# Patient Record
Sex: FEMALE | Race: WHITE | Employment: OTHER | URBAN - METROPOLITAN AREA
[De-identification: names, ages, dates, MRNs, and addresses within clinical notes are randomized per-mention and may not be internally consistent; named-entity substitution may affect disease eponyms.]

---

## 2017-04-18 ENCOUNTER — OFFICE VISIT (OUTPATIENT)
Dept: CARDIOLOGY CLINIC | Age: 67
End: 2017-04-18

## 2017-04-18 VITALS
WEIGHT: 143 LBS | DIASTOLIC BLOOD PRESSURE: 80 MMHG | SYSTOLIC BLOOD PRESSURE: 130 MMHG | HEIGHT: 62 IN | HEART RATE: 82 BPM | BODY MASS INDEX: 26.31 KG/M2

## 2017-04-18 DIAGNOSIS — R07.9 CHEST PAIN, UNSPECIFIED TYPE: Primary | ICD-10-CM

## 2017-04-18 DIAGNOSIS — I25.10 CORONARY ARTERY DISEASE INVOLVING NATIVE HEART WITHOUT ANGINA PECTORIS, UNSPECIFIED VESSEL OR LESION TYPE: ICD-10-CM

## 2017-04-18 DIAGNOSIS — Z72.0 TOBACCO ABUSE: ICD-10-CM

## 2017-04-18 PROCEDURE — 93000 ELECTROCARDIOGRAM COMPLETE: CPT | Performed by: NURSE PRACTITIONER

## 2017-04-18 PROCEDURE — 99214 OFFICE O/P EST MOD 30 MIN: CPT | Performed by: NURSE PRACTITIONER

## 2017-06-23 RX ORDER — CARVEDILOL 25 MG/1
TABLET ORAL
Qty: 180 TABLET | Refills: 3 | Status: SHIPPED | OUTPATIENT
Start: 2017-06-23 | End: 2018-06-29 | Stop reason: SDUPTHER

## 2017-06-23 RX ORDER — CLOPIDOGREL BISULFATE 75 MG/1
TABLET ORAL
Qty: 90 TABLET | Refills: 3 | Status: SHIPPED | OUTPATIENT
Start: 2017-06-23 | End: 2018-06-29 | Stop reason: SDUPTHER

## 2017-06-23 RX ORDER — LISINOPRIL 40 MG/1
40 TABLET ORAL DAILY
Qty: 90 TABLET | Refills: 3 | Status: SHIPPED | OUTPATIENT
Start: 2017-06-23 | End: 2018-06-25 | Stop reason: SDUPTHER

## 2017-06-23 RX ORDER — PRAVASTATIN SODIUM 40 MG
TABLET ORAL
Qty: 90 TABLET | Refills: 3 | Status: SHIPPED | OUTPATIENT
Start: 2017-06-23 | End: 2018-06-29 | Stop reason: SDUPTHER

## 2018-03-30 ENCOUNTER — TELEPHONE (OUTPATIENT)
Dept: CARDIOLOGY CLINIC | Age: 68
End: 2018-03-30

## 2018-03-30 DIAGNOSIS — R06.02 SOB (SHORTNESS OF BREATH): Primary | ICD-10-CM

## 2018-04-06 ENCOUNTER — HOSPITAL ENCOUNTER (OUTPATIENT)
Dept: NON INVASIVE DIAGNOSTICS | Age: 68
Discharge: HOME | End: 2018-04-06
Attending: NURSE PRACTITIONER | Admitting: NURSE PRACTITIONER

## 2018-04-06 DIAGNOSIS — R06.02 SHORTNESS OF BREATH: ICD-10-CM

## 2018-04-06 LAB
LEFT VENTRICULAR EJECTION FRACTION HIGH VALUE: 40 %
LEFT VENTRICULAR EJECTION FRACTION MODE: NORMAL
LV EF: 40 %
LVEF MODALITY: NORMAL

## 2018-06-21 ENCOUNTER — TELEPHONE (OUTPATIENT)
Dept: CARDIOLOGY CLINIC | Age: 68
End: 2018-06-21

## 2018-06-25 RX ORDER — LISINOPRIL 40 MG/1
40 TABLET ORAL DAILY
Qty: 90 TABLET | Refills: 0 | Status: SHIPPED | OUTPATIENT
Start: 2018-06-25 | End: 2018-09-24 | Stop reason: SDUPTHER

## 2018-06-29 ENCOUNTER — OFFICE VISIT (OUTPATIENT)
Dept: CARDIOLOGY CLINIC | Age: 68
End: 2018-06-29

## 2018-06-29 VITALS
SYSTOLIC BLOOD PRESSURE: 160 MMHG | WEIGHT: 144.6 LBS | BODY MASS INDEX: 26.61 KG/M2 | DIASTOLIC BLOOD PRESSURE: 80 MMHG | HEIGHT: 62 IN

## 2018-06-29 DIAGNOSIS — I25.10 CORONARY ARTERY DISEASE INVOLVING NATIVE HEART WITHOUT ANGINA PECTORIS, UNSPECIFIED VESSEL OR LESION TYPE: ICD-10-CM

## 2018-06-29 DIAGNOSIS — Z72.0 TOBACCO ABUSE: ICD-10-CM

## 2018-06-29 DIAGNOSIS — I10 ESSENTIAL HYPERTENSION: ICD-10-CM

## 2018-06-29 DIAGNOSIS — R07.9 CHEST PAIN, UNSPECIFIED TYPE: Primary | ICD-10-CM

## 2018-06-29 PROCEDURE — 4004F PT TOBACCO SCREEN RCVD TLK: CPT | Performed by: NURSE PRACTITIONER

## 2018-06-29 PROCEDURE — 4040F PNEUMOC VAC/ADMIN/RCVD: CPT | Performed by: NURSE PRACTITIONER

## 2018-06-29 PROCEDURE — G8400 PT W/DXA NO RESULTS DOC: HCPCS | Performed by: NURSE PRACTITIONER

## 2018-06-29 PROCEDURE — 99214 OFFICE O/P EST MOD 30 MIN: CPT | Performed by: NURSE PRACTITIONER

## 2018-06-29 PROCEDURE — 93000 ELECTROCARDIOGRAM COMPLETE: CPT | Performed by: NURSE PRACTITIONER

## 2018-06-29 PROCEDURE — 1123F ACP DISCUSS/DSCN MKR DOCD: CPT | Performed by: NURSE PRACTITIONER

## 2018-06-29 PROCEDURE — G8598 ASA/ANTIPLAT THER USED: HCPCS | Performed by: NURSE PRACTITIONER

## 2018-06-29 PROCEDURE — 3017F COLORECTAL CA SCREEN DOC REV: CPT | Performed by: NURSE PRACTITIONER

## 2018-06-29 PROCEDURE — G8427 DOCREV CUR MEDS BY ELIG CLIN: HCPCS | Performed by: NURSE PRACTITIONER

## 2018-06-29 PROCEDURE — 1090F PRES/ABSN URINE INCON ASSESS: CPT | Performed by: NURSE PRACTITIONER

## 2018-06-29 PROCEDURE — G8419 CALC BMI OUT NRM PARAM NOF/U: HCPCS | Performed by: NURSE PRACTITIONER

## 2018-06-29 RX ORDER — PRAVASTATIN SODIUM 40 MG
TABLET ORAL
Qty: 90 TABLET | Refills: 3 | Status: SHIPPED | OUTPATIENT
Start: 2018-06-29 | End: 2019-06-27 | Stop reason: SDUPTHER

## 2018-06-29 RX ORDER — CARVEDILOL 25 MG/1
TABLET ORAL
Qty: 180 TABLET | Refills: 3 | Status: SHIPPED | OUTPATIENT
Start: 2018-06-29 | End: 2019-06-27 | Stop reason: SDUPTHER

## 2018-06-29 RX ORDER — CLOPIDOGREL BISULFATE 75 MG/1
TABLET ORAL
Qty: 90 TABLET | Refills: 3 | Status: SHIPPED | OUTPATIENT
Start: 2018-06-29 | End: 2019-06-27 | Stop reason: SDUPTHER

## 2018-06-29 RX ORDER — GABAPENTIN 100 MG/1
100 CAPSULE ORAL 3 TIMES DAILY
COMMUNITY
End: 2020-05-22

## 2018-09-24 RX ORDER — LISINOPRIL 40 MG/1
TABLET ORAL
Qty: 90 TABLET | Refills: 0 | Status: SHIPPED | OUTPATIENT
Start: 2018-09-24 | End: 2018-12-21 | Stop reason: SDUPTHER

## 2018-12-04 ENCOUNTER — OFFICE VISIT (OUTPATIENT)
Dept: CARDIOLOGY CLINIC | Age: 68
End: 2018-12-04
Payer: MEDICARE

## 2018-12-04 VITALS
DIASTOLIC BLOOD PRESSURE: 60 MMHG | HEIGHT: 62 IN | SYSTOLIC BLOOD PRESSURE: 122 MMHG | WEIGHT: 144 LBS | HEART RATE: 62 BPM | BODY MASS INDEX: 26.5 KG/M2

## 2018-12-04 DIAGNOSIS — E78.5 HYPERLIPIDEMIA, UNSPECIFIED HYPERLIPIDEMIA TYPE: ICD-10-CM

## 2018-12-04 DIAGNOSIS — I42.9 CARDIOMYOPATHY, UNSPECIFIED TYPE (HCC): ICD-10-CM

## 2018-12-04 DIAGNOSIS — I25.10 CORONARY ARTERY DISEASE INVOLVING NATIVE HEART WITHOUT ANGINA PECTORIS, UNSPECIFIED VESSEL OR LESION TYPE: ICD-10-CM

## 2018-12-04 DIAGNOSIS — Z72.0 TOBACCO ABUSE: Primary | ICD-10-CM

## 2018-12-04 PROCEDURE — G8484 FLU IMMUNIZE NO ADMIN: HCPCS | Performed by: NURSE PRACTITIONER

## 2018-12-04 PROCEDURE — 3017F COLORECTAL CA SCREEN DOC REV: CPT | Performed by: NURSE PRACTITIONER

## 2018-12-04 PROCEDURE — G8419 CALC BMI OUT NRM PARAM NOF/U: HCPCS | Performed by: NURSE PRACTITIONER

## 2018-12-04 PROCEDURE — 1123F ACP DISCUSS/DSCN MKR DOCD: CPT | Performed by: NURSE PRACTITIONER

## 2018-12-04 PROCEDURE — 1101F PT FALLS ASSESS-DOCD LE1/YR: CPT | Performed by: NURSE PRACTITIONER

## 2018-12-04 PROCEDURE — 99214 OFFICE O/P EST MOD 30 MIN: CPT | Performed by: NURSE PRACTITIONER

## 2018-12-04 PROCEDURE — G8598 ASA/ANTIPLAT THER USED: HCPCS | Performed by: NURSE PRACTITIONER

## 2018-12-04 PROCEDURE — 4040F PNEUMOC VAC/ADMIN/RCVD: CPT | Performed by: NURSE PRACTITIONER

## 2018-12-04 PROCEDURE — G8400 PT W/DXA NO RESULTS DOC: HCPCS | Performed by: NURSE PRACTITIONER

## 2018-12-04 PROCEDURE — G8427 DOCREV CUR MEDS BY ELIG CLIN: HCPCS | Performed by: NURSE PRACTITIONER

## 2018-12-04 PROCEDURE — 4004F PT TOBACCO SCREEN RCVD TLK: CPT | Performed by: NURSE PRACTITIONER

## 2018-12-04 PROCEDURE — 1090F PRES/ABSN URINE INCON ASSESS: CPT | Performed by: NURSE PRACTITIONER

## 2018-12-21 RX ORDER — LISINOPRIL 40 MG/1
TABLET ORAL
Qty: 90 TABLET | Refills: 3 | Status: SHIPPED | OUTPATIENT
Start: 2018-12-21 | End: 2019-12-17 | Stop reason: SDUPTHER

## 2019-01-09 ENCOUNTER — TELEPHONE (OUTPATIENT)
Dept: CARDIOLOGY CLINIC | Age: 69
End: 2019-01-09

## 2019-01-09 ENCOUNTER — OFFICE VISIT (OUTPATIENT)
Dept: CARDIOLOGY CLINIC | Age: 69
End: 2019-01-09
Payer: MEDICARE

## 2019-01-09 VITALS
BODY MASS INDEX: 26.31 KG/M2 | WEIGHT: 143 LBS | HEART RATE: 72 BPM | HEIGHT: 62 IN | DIASTOLIC BLOOD PRESSURE: 60 MMHG | SYSTOLIC BLOOD PRESSURE: 120 MMHG

## 2019-01-09 DIAGNOSIS — R07.9 CHEST PAIN, UNSPECIFIED TYPE: Primary | ICD-10-CM

## 2019-01-09 DIAGNOSIS — I25.10 CORONARY ARTERY DISEASE INVOLVING NATIVE HEART WITHOUT ANGINA PECTORIS, UNSPECIFIED VESSEL OR LESION TYPE: ICD-10-CM

## 2019-01-09 DIAGNOSIS — Z72.0 TOBACCO ABUSE: ICD-10-CM

## 2019-01-09 DIAGNOSIS — I10 ESSENTIAL HYPERTENSION: ICD-10-CM

## 2019-01-09 PROCEDURE — 4040F PNEUMOC VAC/ADMIN/RCVD: CPT | Performed by: NURSE PRACTITIONER

## 2019-01-09 PROCEDURE — 99214 OFFICE O/P EST MOD 30 MIN: CPT | Performed by: NURSE PRACTITIONER

## 2019-01-09 PROCEDURE — 1123F ACP DISCUSS/DSCN MKR DOCD: CPT | Performed by: NURSE PRACTITIONER

## 2019-01-09 PROCEDURE — 4004F PT TOBACCO SCREEN RCVD TLK: CPT | Performed by: NURSE PRACTITIONER

## 2019-01-09 PROCEDURE — G8419 CALC BMI OUT NRM PARAM NOF/U: HCPCS | Performed by: NURSE PRACTITIONER

## 2019-01-09 PROCEDURE — G8427 DOCREV CUR MEDS BY ELIG CLIN: HCPCS | Performed by: NURSE PRACTITIONER

## 2019-01-09 PROCEDURE — 1090F PRES/ABSN URINE INCON ASSESS: CPT | Performed by: NURSE PRACTITIONER

## 2019-01-09 PROCEDURE — G8400 PT W/DXA NO RESULTS DOC: HCPCS | Performed by: NURSE PRACTITIONER

## 2019-01-09 PROCEDURE — 93000 ELECTROCARDIOGRAM COMPLETE: CPT | Performed by: NURSE PRACTITIONER

## 2019-01-09 PROCEDURE — 1101F PT FALLS ASSESS-DOCD LE1/YR: CPT | Performed by: NURSE PRACTITIONER

## 2019-01-09 PROCEDURE — G8598 ASA/ANTIPLAT THER USED: HCPCS | Performed by: NURSE PRACTITIONER

## 2019-01-09 PROCEDURE — G8484 FLU IMMUNIZE NO ADMIN: HCPCS | Performed by: NURSE PRACTITIONER

## 2019-01-09 PROCEDURE — 3017F COLORECTAL CA SCREEN DOC REV: CPT | Performed by: NURSE PRACTITIONER

## 2019-01-09 RX ORDER — AMLODIPINE BESYLATE 5 MG/1
5 TABLET ORAL DAILY
COMMUNITY

## 2019-01-11 ENCOUNTER — HOSPITAL ENCOUNTER (OUTPATIENT)
Dept: NON INVASIVE DIAGNOSTICS | Age: 69
Discharge: HOME | End: 2019-01-11
Payer: MEDICARE

## 2019-01-11 LAB
LV EF: 40 %
LVEF MODALITY: NORMAL

## 2019-01-11 PROCEDURE — A9502 TC99M TETROFOSMIN: HCPCS | Performed by: FAMILY MEDICINE

## 2019-01-11 PROCEDURE — 3430000000 HC RX DIAGNOSTIC RADIOPHARMACEUTICAL: Performed by: FAMILY MEDICINE

## 2019-01-11 PROCEDURE — 78452 HT MUSCLE IMAGE SPECT MULT: CPT | Performed by: INTERNAL MEDICINE

## 2019-01-11 PROCEDURE — 6360000002 HC RX W HCPCS: Performed by: FAMILY MEDICINE

## 2019-01-11 PROCEDURE — 93017 CV STRESS TEST TRACING ONLY: CPT | Performed by: INTERNAL MEDICINE

## 2019-01-11 RX ADMIN — TETROFOSMIN 30 MILLICURIE: 0.23 INJECTION, POWDER, LYOPHILIZED, FOR SOLUTION INTRAVENOUS at 08:57

## 2019-01-11 RX ADMIN — REGADENOSON 0.4 MG: 0.08 INJECTION, SOLUTION INTRAVENOUS at 08:58

## 2019-01-11 RX ADMIN — TETROFOSMIN 10 MILLICURIE: 0.23 INJECTION, POWDER, LYOPHILIZED, FOR SOLUTION INTRAVENOUS at 07:53

## 2019-06-27 ENCOUNTER — TELEPHONE (OUTPATIENT)
Dept: CARDIOLOGY CLINIC | Age: 69
End: 2019-06-27

## 2019-06-27 RX ORDER — PRAVASTATIN SODIUM 40 MG
TABLET ORAL
Qty: 90 TABLET | Refills: 3 | Status: SHIPPED | OUTPATIENT
Start: 2019-06-27 | End: 2020-06-24

## 2019-06-27 RX ORDER — CLOPIDOGREL BISULFATE 75 MG/1
TABLET ORAL
Qty: 90 TABLET | Refills: 3 | Status: SHIPPED | OUTPATIENT
Start: 2019-06-27 | End: 2020-06-19 | Stop reason: SDUPTHER

## 2019-06-27 RX ORDER — CARVEDILOL 25 MG/1
TABLET ORAL
Qty: 180 TABLET | Refills: 3 | Status: SHIPPED | OUTPATIENT
Start: 2019-06-27 | End: 2020-06-10 | Stop reason: SDUPTHER

## 2019-06-27 NOTE — TELEPHONE ENCOUNTER
Medication Refill    Last appointment with cardiology? 01-09-19    Next appointment with Cardiology? Will call next month to schedule    Medication needing refilled: ogrel (PLAVIX) 75 MG tablet    carvedilol (COREG) 25 MG tablet     pravastatin (PRAVACHOL) 40 MG tablet    Doseage of the medication:     How are you taking this medication (QD, BID, TID, QID, PRN):    Patient want a 30 or 90 day supply called in: 80    Which Pharmacy are we sending the medication to [facility] on [facility] - #[phone]    THIS IS A NEW PHARMACY.

## 2019-12-17 ENCOUNTER — TELEPHONE (OUTPATIENT)
Dept: CARDIOLOGY CLINIC | Age: 69
End: 2019-12-17

## 2019-12-17 RX ORDER — LISINOPRIL 40 MG/1
TABLET ORAL
Qty: 90 TABLET | Refills: 1 | Status: SHIPPED | OUTPATIENT
Start: 2019-12-17 | End: 2020-06-10

## 2020-05-05 ENCOUNTER — TELEPHONE (OUTPATIENT)
Dept: CARDIOLOGY CLINIC | Age: 70
End: 2020-05-05

## 2020-05-21 ENCOUNTER — TELEPHONE (OUTPATIENT)
Dept: CARDIOLOGY CLINIC | Age: 70
End: 2020-05-21

## 2020-05-21 NOTE — TELEPHONE ENCOUNTER
Called pt, states she is having chest tightness that started yesterday. I advised her to go to the ER. She stated she does not want to do that. I offered an appt with Dr Ivan Cloud in [facility]. She states \"I'll get lost\". I offered to give her the address, but she declined to come to . I advised her, again, to go to the ER. She states \"I'll try and get there today\". I then offered an appt with TS in . She agreed to see NPTS 5/22/20. appt made.

## 2020-05-21 NOTE — TELEPHONE ENCOUNTER
Chest pain starting on Tuesday night , pain is constant  . Patient states she feels \"bad\" but has no nausea, dizziness , numbness, weakness or SOB . Asking for appt today or  tomorrow with ELLEN.

## 2020-05-22 ENCOUNTER — OFFICE VISIT (OUTPATIENT)
Dept: CARDIOLOGY CLINIC | Age: 70
End: 2020-05-22
Payer: MEDICARE

## 2020-05-22 VITALS
OXYGEN SATURATION: 95 % | HEART RATE: 74 BPM | SYSTOLIC BLOOD PRESSURE: 112 MMHG | WEIGHT: 138 LBS | BODY MASS INDEX: 25.4 KG/M2 | DIASTOLIC BLOOD PRESSURE: 60 MMHG | HEIGHT: 62 IN

## 2020-05-22 PROCEDURE — 93000 ELECTROCARDIOGRAM COMPLETE: CPT | Performed by: NURSE PRACTITIONER

## 2020-05-22 PROCEDURE — 99214 OFFICE O/P EST MOD 30 MIN: CPT | Performed by: NURSE PRACTITIONER

## 2020-05-22 RX ORDER — FAMOTIDINE 20 MG/1
20 TABLET, FILM COATED ORAL 2 TIMES DAILY
Qty: 60 TABLET | Refills: 0 | Status: SHIPPED | OUTPATIENT
Start: 2020-05-22 | End: 2020-06-22 | Stop reason: SDUPTHER

## 2020-05-22 NOTE — PROGRESS NOTES
[address]   Cardiac Evaluation      Patient: Stefania Reese  YOB: 1950         Chief Complaint   Patient presents with    Coronary Artery Disease     C/o cp     Cardiomyopathy    Hypertension    Hyperlipidemia        Referring provider: Adrienne Walker MD    History of Present Illness:  Ms. Cindy España is here for an acute visit. Her history includes coronary disease with stent placed to proximal LAD 9/10, cardiomyopathy, hypertension, and hyperlipidemia. MUGA performed 12/10 revealed at EF of 37%. Echo 10/13 revealed EF 25-30%. She did have GXT 12/14 and EF was 44 % , ECHO 4/18 showed EF 40%, she has not had any exacerbations of CHF. Ms Cindy España continues to smoke 1 PPD. Today, Ms. Ahmadi complains of chest pain which primarily occurs after eating. Her pain occasionally wakes her up from sleeping when she is lying down. It feels like a tightness in her chest. She has not been taking her Nexium in over one year, but she can't remember exactly when it was stopped. She is not very active, but she doesn't notice any pain with exertion. Ms. Cindy España can't recall how long the pain lasts but she occasionally takes Tums for the pain and it helps. She does not remember what the pain was like that brought her in for her heart attack and stent in 2010. Ms. Cindy España is dealing with a lot of personal stress in her life. Her granddaughter, her boyfriend, and their baby were living with her until last night. She states it is for the best that they suddenly moved out today. Past Medical History:   has a past medical history of Arthritis, CAD (coronary artery disease), and Hypertension. Surgical History:   has a past surgical history that includes Hysterectomy; Bladder surgery (2010); and Colonoscopy.      Current Outpatient Medications   Medication Sig Dispense Refill    lisinopril (PRINIVIL;ZESTRIL) 40 MG tablet TAKE 1 TABLET BY MOUTH ONCE DAILY 90 tablet 1    clopidogrel (PLAVIX) 75 MG Relation Age of Onset    High Blood Pressure Mother     Stroke Mother     Heart Disease Father      Family history has been reviewed and not pertinent except as noted above. Review of Systems:   · Constitutional: there has been no unanticipated weight loss. No change in energy or activity level   · Eyes: No visual changes   · ENT: No Headaches, hearing loss or vertigo. No mouth sores or sore throat. · Cardiovascular: Reviewed in HPI  · Respiratory: No cough or wheezing, no sputum production. · Gastrointestinal: No abdominal pain, appetite loss, blood in stools. No change in bowel or bladder habits. · Genitourinary: No nocturia, dysuria, trouble voiding  · Musculoskeletal:  No gait disturbance, weakness or joint complaints. · Integumentary: No rash or pruritis. · Neurological: No headache, change in muscle strength, numbness or tingling. No change in gait, balance, coordination, mood, affect, memory, mentation, behavior. · Psychiatric: No anxiety or depression  · Endocrine: No malaise or fever  · Hematologic/Lymphatic: No abnormal bruising or bleeding, blood clots or swollen lymph nodes. · Allergic/Immunologic: No nasal congestion or hives. Physical Examination:    Vitals:    05/22/20 0931   BP: 112/60   Site: Left Upper Arm   Position: Sitting   Cuff Size: Medium Adult   Pulse: 74   SpO2: 95%   Weight: 138 lb (62.6 kg)   Height: 5' 2\" (1.575 m)     Body mass index is 25.24 kg/m². Wt Readings from Last 3 Encounters:   05/22/20 138 lb (62.6 kg)   01/09/19 143 lb (64.9 kg)   12/03/18 144 lb (65.3 kg)      BP Readings from Last 3 Encounters:   05/22/20 112/60   01/09/19 120/60   12/03/18 122/60        Physical Examination:    · CONSTITUTIONAL: Well developed, well nourished  · EYES: PERRLA. No xanthelasma, sclera non icteric  · EARS,NOSE,MOUTH,THROAT:  Mucous membranes moist, normal hearing  · NECK: Supple, JVP normal, thyroid not enlarged.  Carotids 2+ without bruits  · RESPIRATORY: Normal effort, no rales or rhonchi  · CARDIOVASCULAR: Normal PMI, regular rate and rhythm, no murmurs, rub or gallop. No edema. Radial pulses present and equal  · CHEST: No scar or masses  · ABDOMEN: Normal bowel sounds. No masses or tenderness. No bruit  · MUSCULOSKELETAL: No clubbing or cyanosis. Moves all extremities well. Normal gait  · SKIN:  Warm and dry. No rashes  · NEUROLOGIC: Cranial nerves intact. Alert and oriented  · PSYCHIATRIC: Calm affect. Appears to have normal judgement and insight    Assessment/Plan  1. Coronary artery disease   ~Nuclear stress 1/2019: There is a moderate-large anterior, septal and apical fixed defect c/w scar. There is no significant ischemia. There is anterior, septal and apical hypokinesis with EF 40%. Similar to 2014 study. ~s/p PCI of proximal LAD in 2010  ~On plavix/ASA/BB/Statin    2. Chest pain           ~Complaining of pain primarily after she eats or is lying down           ~Off of Nexium for roughly one year?           ~Dealing with a lot of personal stress           ~EKG today RBBB (no change)           ~Will start her on pepcid 20mg BID  3. Hyperlipidemia           ~Lipids (5/7/20): , Trig 163           ~On Pravachol 80 mg.            ~Being managed by her PCP, she recently increased her dose from 40 to 80  4. Hypertension           ~Controlled on Norvasc, lisinopril, and coreg            ~Does not check BP at home   5. Cardiomyopathy            ~Stable, no signs of CHF           ~ECHO 4/18 40%           ~Echo 10/13: EF 25-30%. severe hypokinesis of the apical anteroseptal wall. Mild aortic            regurgitation is present. Mild mitral regurgitation is present. mild tricuspid regurgitation             with RVSP 21 mmHg           ~MUGA 12/10: EF 37%           ~Echo 1/25/10: EF 20-25%, mild AR, decreased diastolic function           ~MUGA 6/14 39%           ~GXT 1/2019 40%            ~On Coreg 25 BID    Plan  1. Start pepcid.   2. EKG today: RBBB, no change from prior EKG  3. Return in 4 weeks to see if symptoms resolved  4. Will consider ordering carotids next appointment    Thank you for allowing to me to participate in the care of Houston Mason.

## 2020-06-10 RX ORDER — LISINOPRIL 40 MG/1
TABLET ORAL
Qty: 90 TABLET | Refills: 1 | Status: SHIPPED | OUTPATIENT
Start: 2020-06-10 | End: 2020-12-08

## 2020-06-10 RX ORDER — CARVEDILOL 25 MG/1
TABLET ORAL
Qty: 180 TABLET | Refills: 3 | Status: SHIPPED | OUTPATIENT
Start: 2020-06-10 | End: 2021-06-07 | Stop reason: SDUPTHER

## 2020-06-18 NOTE — TELEPHONE ENCOUNTER
Prescription refill    Last OV:  05/22/20    Last Refill:  06/27/2019    Labs:05/07/20  [facility]    Future Appt: 06/24/20

## 2020-06-19 RX ORDER — PRAVASTATIN SODIUM 80 MG/1
80 TABLET ORAL DAILY
Qty: 90 TABLET | Refills: 3 | OUTPATIENT
Start: 2020-06-19

## 2020-06-19 RX ORDER — CLOPIDOGREL BISULFATE 75 MG/1
TABLET ORAL
Qty: 90 TABLET | Refills: 3 | Status: SHIPPED | OUTPATIENT
Start: 2020-06-19 | End: 2021-06-07 | Stop reason: SDUPTHER

## 2020-06-19 NOTE — TELEPHONE ENCOUNTER
Patient stated her PCP fills her statin and follows her lipid levels. Please have her her follow up with Dr. Chuckie Garcia for that.

## 2020-06-22 ENCOUNTER — TELEPHONE (OUTPATIENT)
Dept: CARDIOLOGY CLINIC | Age: 70
End: 2020-06-22

## 2020-06-22 RX ORDER — FAMOTIDINE 20 MG/1
20 TABLET, FILM COATED ORAL 2 TIMES DAILY
Qty: 60 TABLET | Refills: 3 | Status: ON HOLD | OUTPATIENT
Start: 2020-06-22 | End: 2021-07-06 | Stop reason: HOSPADM

## 2020-06-22 NOTE — PROGRESS NOTES
No current facility-administered medications for this visit. Allergies:  Patient has no known allergies. Social History:  Social History     Socioeconomic History    Marital status:      Spouse name: Not on file    Number of children: Not on file    Years of education: Not on file    Highest education level: Not on file   Occupational History    Not on file   Social Needs    Financial resource strain: Not on file    Food insecurity     Worry: Not on file     Inability: Not on file    Transportation needs     Medical: Not on file     Non-medical: Not on file   Tobacco Use    Smoking status: Current Every Day Smoker     Packs/day: 0.50     Types: Cigarettes    Smokeless tobacco: Never Used   Substance and Sexual Activity    Alcohol use: No    Drug use: No    Sexual activity: Not on file   Lifestyle    Physical activity     Days per week: Not on file     Minutes per session: Not on file    Stress: Not on file   Relationships    Social connections     Talks on phone: Not on file     Gets together: Not on file     Attends [employer] service: Not on file     Active member of club or organization: Not on file     Attends meetings of clubs or organizations: Not on file     Relationship status: Not on file    Intimate partner violence     Fear of current or ex partner: Not on file     Emotionally abused: Not on file     Physically abused: Not on file     Forced sexual activity: Not on file   Other Topics Concern    Not on file   Social History Narrative    Not on file       Family History:   Family History   Problem Relation Age of Onset    High Blood Pressure Mother     Stroke Mother     Heart Disease Father      Family history has been reviewed and not pertinent except as noted above. Review of Systems:   · Constitutional: there has been no unanticipated weight loss.  No change in energy or activity level   · Eyes: No visual changes   · ENT: No Headaches, hearing loss or

## 2020-06-24 ENCOUNTER — OFFICE VISIT (OUTPATIENT)
Dept: CARDIOLOGY CLINIC | Age: 70
End: 2020-06-24
Payer: MEDICARE

## 2020-06-24 VITALS
HEART RATE: 64 BPM | DIASTOLIC BLOOD PRESSURE: 60 MMHG | HEIGHT: 61 IN | SYSTOLIC BLOOD PRESSURE: 134 MMHG | WEIGHT: 137 LBS | BODY MASS INDEX: 25.86 KG/M2 | OXYGEN SATURATION: 96 %

## 2020-06-24 PROBLEM — R07.89 OTHER CHEST PAIN: Status: ACTIVE | Noted: 2020-06-24

## 2020-06-24 PROCEDURE — 99214 OFFICE O/P EST MOD 30 MIN: CPT | Performed by: NURSE PRACTITIONER

## 2020-06-24 RX ORDER — ATORVASTATIN CALCIUM 80 MG/1
80 TABLET, FILM COATED ORAL DAILY
COMMUNITY
End: 2020-11-11 | Stop reason: ALTCHOICE

## 2020-11-03 ENCOUNTER — TELEPHONE (OUTPATIENT)
Dept: CARDIOLOGY CLINIC | Age: 70
End: 2020-11-03

## 2020-11-03 NOTE — TELEPHONE ENCOUNTER
Next Ov is 12/04/2020 with Dr Juana Monsalve. Last OV was 6/24/2020     Plan  1. Check carotid duplex  2. No medication changes at this time  3. Encouraged regular exercise.      Will schedule appointment with Dr. Juana Monsalve in 6 months. Patient lives closer to [place] and does not want to drive to [facility] anymore.

## 2020-11-03 NOTE — TELEPHONE ENCOUNTER
Patient called stating she has a colonoscopy 11/17/2020. Needs to know when to stop her Plavix and aspirin prior to her test. Please call and advise patient. [phone]. Thank you.

## 2020-11-03 NOTE — TELEPHONE ENCOUNTER
Called and spoke with the patient and advised her that she can hold her aspirin and plavix 5 days prior to her colonoscopy. Patient voiced understanding with no further questions at this time .  Call complete

## 2020-11-11 ENCOUNTER — OFFICE VISIT (OUTPATIENT)
Dept: PRIMARY CARE CLINIC | Age: 70
End: 2020-11-11
Payer: MEDICARE

## 2020-11-11 PROCEDURE — 99211 OFF/OP EST MAY X REQ PHY/QHP: CPT | Performed by: NURSE PRACTITIONER

## 2020-11-11 RX ORDER — PRAVASTATIN SODIUM 80 MG/1
80 TABLET ORAL DAILY
COMMUNITY
End: 2020-12-16 | Stop reason: SDUPTHER

## 2020-11-11 NOTE — PROGRESS NOTES
Name_______________________________________Printed:____________________  Date and time of surgery____11/17/2020  1130____________________Arrival Time:__1000  hosp-endo______________   1. The instructions given regarding when and if a patient needs to stop oral intake prior to surgery varies. Follow the specific instructions you were given                  ___Nothing to eat or to drink after Midnight the night before. xxx____Endoscopy patient follow your DRS instructions-generally you will be doing a part of the prep after Midnight                   ____Carbo loading or ERAS instructions will be given to select patients-if you have been given those instructions -please do the following                           The evening before your surgery after dinner before midnight drink 40 ounces of gatorade. If you are diabetic use sugar free. The morning of surgery drink 40 ounces of water. This needs to be finished 3 hours prior to your surgery start time. 2. Take the following pills with a small sip of water on the morning of surgery________coreg, amlodipine,___________________________________________                  Do not take blood pressure medications ending in pril or sartan the stanislav prior to surgery or the morning of surgery-DO NOT TAKE LISINOPRIL   3. Aspirin, Ibuprofen, Advil, Naproxen, Vitamin E and other Anti-inflammatory products and supplements should be stopped for 5 -7days before surgery or as directed by your physician. 4. Check with your Doctor regarding stopping Plavix, Coumadin,Eliquis, Lovenox,Effient,Pradaxa,Xarelto, Fragmin or other blood thinners and follow their instructions. 5. Do not smoke, and do not drink any alcoholic beverages 24 hours prior to surgery. This includes NA Beer. Refrain from the usage of any recreational drugs. 6. You may brush your teeth and gargle the morning of surgery. DO NOT SWALLOW WATER   7.  You MUST make arrangements for a responsible excellent care visitors will be limited to one in the room at any given time. 18.  Please bring picture ID and insurance card. 19.  Visit our web site for additional information:  [email or web address]              20.During flu season no children under the age of 15 are permitted in the hospital for the safety of all patients. 21. If you take a long acting insulin in the evening only  take half of your usual  dose the night  before your procedure              22. If you use a c-pap please bring DOS if staying overnight,             23.For your convenience [facility] has a pharmacy on site to fill your prescriptions. 24. If you use oxygen and have a portable tank please bring it  with you the DOS             25. Bring a complete list of all your medications with name and dose include any supplements. 26. Other__________________________________________   *Please call pre admission testing if you any further questions   [facility]   [address]. [place]  [phone]   [address] [place] [address]       All above information reviewed with patient in person or by phone. Patient verbalizes understanding. All questions and concerns addressed. Patient/Rep____________________                                                                                                                                    There is a one visitor policy at [facility] for all surgeries and endoscopies. Whether the visitor can stay or will be asked to wait in the car will depend on the current policy and if social distancing can be maintained. The policy is subject to change at any time. Please make sure the visitor has a cell phone that is on,charged and able to accept calls, as this may be the way that the staff communicates with them.Pain management is NO VISITOR policyThe patients ride is expected to remain in the car with a cell phone for communication. If the ride is leaving the hospital grounds please make sure they are back in time for pickup. Have the patient inform the staff on arrival what their rides plans are while the patient is in the facility. At the MAIN there is one visitor allowed. Please note that the visitor policy is subject to change. Patient instructed to get their COVID-19 test done as directed by their doctor (5-7 days prior to procedure)  or patient states will get on _11/11/2020  mff_________. Patient was notified that they need to have an appointment,number to call provided. The day the COVID test is done is considered day one. Instructed to self quarantine after test until DOS.

## 2020-11-11 NOTE — PROGRESS NOTES
Aime Ahmadi received a viral test for COVID-19. They were educated on isolation and quarantine as appropriate. For any symptoms, they were directed to seek care from their PCP, given contact information to establish with a doctor, directed to an urgent care or the emergency room.

## 2020-11-11 NOTE — PATIENT INSTRUCTIONS

## 2020-11-12 LAB — SARS-COV-2: NOT DETECTED

## 2020-11-16 ENCOUNTER — TELEPHONE (OUTPATIENT)
Dept: CARDIOLOGY CLINIC | Age: 70
End: 2020-11-16

## 2020-11-16 NOTE — PROGRESS NOTES
PT STATES SHE WAS UNAWARE THAT SHE NEEDED SOMEONE TO STAY WITH HER AFTER SURGERY. I TOLD HER THAT THIS IS IMPERATIVE. I INFORMED DR STRANGE'S SURGERY SCHEDULER THAT PT HAS A CARDIAC HISTORY AND IS ON BLOOD THINNERS. PT HAS STOPPED ASA AND PLAVIX FOR COLONOSCOPY. SURGERY SCHEDULER TO CONTACT [facility] FOR OK TO HOLD BLOOD THINNERS FOR SURGERY. ,

## 2020-11-16 NOTE — TELEPHONE ENCOUNTER
CARDIAC CLEARANCE     What type of procedure are you having? Hemorrhoidectomy    Which physician is performing your procedure? Dr Leatrice Ahumada    When is your procedure scheduled for? 11/19/2020    Where are you having this procedure? [facility]    Are you taking Blood Thinners? Yes   If so what? (Name/dose/frequesncy)     Does the surgeon want you to stop your blood thinner? If so for how long?  Pt already stopped    Phone Number and Contact Name for Physicians office: [phone]    Fax number to send information: [phone] and to [phone]

## 2020-11-16 NOTE — PROGRESS NOTES
I CONTACTED PT RE HER SURGERY ON 11-19. SHE HAD HER COVID TEST ON 11/11. I THEN DISCOVERED THAT SHE'S HAVING A COLONOSCOPY TOMORROW. IT SEEMS LIKELY SHE DIDN'T TELL COVID TESTING STAFF ABOUT BOTH APPOINTMENTS. SHE DOESN'T REMEMBER. I ASKED HER TO CALL THEM ASAP AND SEE IF SHE CAN GET IN TODAY. I CALLED PT BACK AND SHE SAID THEY COULDN'T GET HER IN UNTIL Wednesday. I CONTACTED COVID TESTING AND THEY WILL TRY TO GET HER IN TODAY OR TOMORROW.

## 2020-11-16 NOTE — LETTER
[facility] Cardiology [address] [place] [address]  [address]  Phone: [phone]  Fax: [phone]    Abram Ross MD        November 17, 2020     Patient: Peri Bergeron   YOB: 1950   Date of Visit: 11/16/2020       To Whom It May Concern: It is my medical opinion that [facility], may proceed with surgery. Advised to resume the Plavix as soon as possible after the procedure. If you have any questions or concerns, please don't hesitate to call.     Sincerely,      Abram Ross MD

## 2020-11-16 NOTE — TELEPHONE ENCOUNTER
I initially send a message to Kristofer Shivani who sees her in [place] office (last OV 6/2020), but she is OOT all week. Can she be cleared for surgery on the 19th? She has apparently already stopped her Plavix. She has an jani't with [facility] on 12/4/2020 (not sure why it's not in [place]). PMH: CAD/stent 2010, CM, HTN, HLD. Nuc stress 1/2019:    There is a moderate-large anterior, septal and apical fixed defect c/w scar.     There is no significant ischemia.     There is anterior, septal and apical hypokinesis with EF=40%     Intermediate risk.     Similar to 2014 study

## 2020-11-16 NOTE — PROGRESS NOTES
you are here and take you home after your surgery. You will not be allowed to leave alone or drive yourself home. It is strongly suggested someone stay with you the first 24 hrs. Your surgery will be cancelled if you do not have a ride home. 8. A parent/legal guardian must accompany a child scheduled for surgery and plan to stay at the hospital until the child is discharged. Please do not bring other children with you. 9. Please wear simple, loose fitting clothing to the hospital.  Julio Egan not bring valuables (money, credit cards, checkbooks, etc.) Do not wear any makeup (including no eye makeup) or nail polish on your fingers or toes. 10. DO NOT wear any jewelry or piercings on day of surgery. All body piercing jewelry must be removed. 11. If you have ___dentures, they will be removed before going to the OR; we will provide you a container. If you wear ___contact lenses or ___glasses, they will be removed; please bring a case for them. 12. Please see your family doctor/pediatrician for a history & physical and/or concerning medications. Bring any test results/reports from your physician's office. PCP__________________Phone___________H&P Appt. Date________             13 If you  have a Living Will and Durable Power of  for Healthcare, please bring in a copy. 15. Notify your Surgeon if you develop any illness between now and surgery  time, cough, cold, fever, sore throat, nausea, vomiting, etc.  Please notify your surgeon if you experience dizziness, shortness of breath or blurred vision between now & the time of your surgery             15. DO NOT shave your operative site 96 hours prior to surgery. For face & neck surgery, men may use an electric razor 48 hours prior to surgery. 16. Shower the night before or morning of surgery using an antibacterial soap or as you have been instructed.              17. To provide excellent care visitors will be limited to one in the room at any given time. 18.  Please bring picture ID and insurance card. 19.  Visit our web site for additional information:  [email or web address]              20.During flu season no children under the age of 15 are permitted in the hospital for the safety of all patients. 21. If you take a long acting insulin in the evening only  take half of your usual  dose the night  before your procedure              22. If you use a c-pap please bring DOS if staying overnight,             23.For your convenience [facility] has a pharmacy on site to fill your prescriptions. 24. If you use oxygen and have a portable tank please bring it  with you the DOS             25. Bring a complete list of all your medications with name and dose include any supplements. 26. Other__Patient instructed to get their COVID-19 test done as directed by their doctor (5-7 days prior to procedure)  or patient states will get on __________. Patient was notified that they need to have an appointment,number to call provided. The day the COVID test is done is considered day one. Instructed to self quarantine after test until DOS. There is a one visitor policy at [facility] for all surgeries and endoscopies. Whether the visitor can stay or will be asked to wait in the car will depend on the current policy and if social distancing can be maintained. The policy is subject to change at any time. Please make sure the visitor has a cell phone that is on,charged and able to accept calls, as this may be the way that the staff communicates with them. Pain management is NO VISITOR policyThe patients ride is expected to remain in the car with a cell phone for communication. If the ride is leaving the hospital grounds please make sure they are back in time for pickup. Have the patient inform the staff on arrival what their rides plans are while the patient is in the facility. At the MAIN there is one visitor allowed. Please note that the visitor policy is subject to change.________________________________________   *Please call pre admission testing if you any further questions   [facility] [address] [place] [address]. [place]  [phone]   [address] [place] [address]       All above information reviewed with patient in person or by phone. Patient verbalizes understanding. All questions and concerns addressed.                                                                                                  Patient/Rep_PT___________________                                                                                                                                    PRE OP INSTRUCTIONS

## 2020-11-17 ENCOUNTER — ANESTHESIA (OUTPATIENT)
Dept: ENDOSCOPY | Age: 70
End: 2020-11-17
Payer: MEDICARE

## 2020-11-17 ENCOUNTER — HOSPITAL ENCOUNTER (OUTPATIENT)
Age: 70
Setting detail: OUTPATIENT SURGERY
Discharge: HOME | End: 2020-11-17
Attending: SURGERY | Admitting: SURGERY
Payer: MEDICARE

## 2020-11-17 ENCOUNTER — ANESTHESIA EVENT (OUTPATIENT)
Dept: ENDOSCOPY | Age: 70
End: 2020-11-17
Payer: MEDICARE

## 2020-11-17 VITALS
DIASTOLIC BLOOD PRESSURE: 63 MMHG | RESPIRATION RATE: 1 BRPM | SYSTOLIC BLOOD PRESSURE: 132 MMHG | OXYGEN SATURATION: 100 %

## 2020-11-17 VITALS
BODY MASS INDEX: 26.43 KG/M2 | OXYGEN SATURATION: 97 % | DIASTOLIC BLOOD PRESSURE: 80 MMHG | HEIGHT: 61 IN | RESPIRATION RATE: 21 BRPM | WEIGHT: 140 LBS | HEART RATE: 78 BPM | TEMPERATURE: 97.7 F | SYSTOLIC BLOOD PRESSURE: 116 MMHG

## 2020-11-17 PROCEDURE — 6360000002 HC RX W HCPCS: Performed by: NURSE ANESTHETIST, CERTIFIED REGISTERED

## 2020-11-17 PROCEDURE — 3700000000 HC ANESTHESIA ATTENDED CARE: Performed by: SURGERY

## 2020-11-17 PROCEDURE — 2720000010 HC SURG SUPPLY STERILE: Performed by: SURGERY

## 2020-11-17 PROCEDURE — 2709999900 HC NON-CHARGEABLE SUPPLY: Performed by: SURGERY

## 2020-11-17 PROCEDURE — 2580000003 HC RX 258: Performed by: ANESTHESIOLOGY

## 2020-11-17 PROCEDURE — 3609010600 HC COLONOSCOPY POLYPECTOMY SNARE/COLD BIOPSY: Performed by: SURGERY

## 2020-11-17 PROCEDURE — 7100000011 HC PHASE II RECOVERY - ADDTL 15 MIN: Performed by: SURGERY

## 2020-11-17 PROCEDURE — 7100000001 HC PACU RECOVERY - ADDTL 15 MIN: Performed by: SURGERY

## 2020-11-17 PROCEDURE — 7100000000 HC PACU RECOVERY - FIRST 15 MIN: Performed by: SURGERY

## 2020-11-17 PROCEDURE — 3609010300 HC COLONOSCOPY W/BIOPSY SINGLE/MULTIPLE: Performed by: SURGERY

## 2020-11-17 PROCEDURE — 7100000010 HC PHASE II RECOVERY - FIRST 15 MIN: Performed by: SURGERY

## 2020-11-17 PROCEDURE — 88305 TISSUE EXAM BY PATHOLOGIST: CPT

## 2020-11-17 PROCEDURE — 2500000003 HC RX 250 WO HCPCS: Performed by: NURSE ANESTHETIST, CERTIFIED REGISTERED

## 2020-11-17 PROCEDURE — 3700000001 HC ADD 15 MINUTES (ANESTHESIA): Performed by: SURGERY

## 2020-11-17 RX ORDER — SODIUM CHLORIDE 9 MG/ML
INJECTION, SOLUTION INTRAVENOUS CONTINUOUS
Status: DISCONTINUED | OUTPATIENT
Start: 2020-11-17 | End: 2020-11-17 | Stop reason: HOSPADM

## 2020-11-17 RX ORDER — ONDANSETRON 2 MG/ML
4 INJECTION INTRAMUSCULAR; INTRAVENOUS
Status: DISCONTINUED | OUTPATIENT
Start: 2020-11-17 | End: 2020-11-17 | Stop reason: HOSPADM

## 2020-11-17 RX ORDER — LIDOCAINE HYDROCHLORIDE 20 MG/ML
INJECTION, SOLUTION EPIDURAL; INFILTRATION; INTRACAUDAL; PERINEURAL PRN
Status: DISCONTINUED | OUTPATIENT
Start: 2020-11-17 | End: 2020-11-17 | Stop reason: SDUPTHER

## 2020-11-17 RX ORDER — PROPOFOL 10 MG/ML
INJECTION, EMULSION INTRAVENOUS CONTINUOUS PRN
Status: DISCONTINUED | OUTPATIENT
Start: 2020-11-17 | End: 2020-11-17 | Stop reason: SDUPTHER

## 2020-11-17 RX ORDER — HYDROCODONE BITARTRATE AND ACETAMINOPHEN 5; 325 MG/1; MG/1
1 TABLET ORAL
Status: DISCONTINUED | OUTPATIENT
Start: 2020-11-17 | End: 2020-11-17 | Stop reason: HOSPADM

## 2020-11-17 RX ORDER — PROPOFOL 10 MG/ML
INJECTION, EMULSION INTRAVENOUS PRN
Status: DISCONTINUED | OUTPATIENT
Start: 2020-11-17 | End: 2020-11-17 | Stop reason: SDUPTHER

## 2020-11-17 RX ORDER — LIDOCAINE HYDROCHLORIDE 10 MG/ML
1 INJECTION, SOLUTION EPIDURAL; INFILTRATION; INTRACAUDAL; PERINEURAL
Status: DISCONTINUED | OUTPATIENT
Start: 2020-11-17 | End: 2020-11-17 | Stop reason: HOSPADM

## 2020-11-17 RX ADMIN — PROPOFOL 40 MG: 10 INJECTION, EMULSION INTRAVENOUS at 11:35

## 2020-11-17 RX ADMIN — SODIUM CHLORIDE: 9 INJECTION, SOLUTION INTRAVENOUS at 10:41

## 2020-11-17 RX ADMIN — PROPOFOL 100 MCG/KG/MIN: 10 INJECTION, EMULSION INTRAVENOUS at 11:35

## 2020-11-17 RX ADMIN — PROPOFOL 30 MG: 10 INJECTION, EMULSION INTRAVENOUS at 11:36

## 2020-11-17 RX ADMIN — LIDOCAINE HYDROCHLORIDE 60 MG: 20 INJECTION, SOLUTION EPIDURAL; INFILTRATION; INTRACAUDAL; PERINEURAL at 11:35

## 2020-11-17 RX ADMIN — SODIUM CHLORIDE: 9 INJECTION, SOLUTION INTRAVENOUS at 11:28

## 2020-11-17 ASSESSMENT — LIFESTYLE VARIABLES: SMOKING_STATUS: 1

## 2020-11-17 ASSESSMENT — PULMONARY FUNCTION TESTS
PIF_VALUE: 1
PIF_VALUE: 0
PIF_VALUE: 1
PIF_VALUE: 0
PIF_VALUE: 1
PIF_VALUE: 0

## 2020-11-17 ASSESSMENT — PAIN - FUNCTIONAL ASSESSMENT: PAIN_FUNCTIONAL_ASSESSMENT: 0-10

## 2020-11-17 NOTE — PROGRESS NOTES
Patient on beta blocker, did not take today. Last on 11/16 at 1100AM. Dr. Sherron Garcia aware, no new orders.

## 2020-11-17 NOTE — PROCEDURES
[address]                     [address] [facility], [address] [facility]                               COLONOSCOPY REPORT    PATIENT NAME: Namita Kidd                     :        1950  MED REC NO:   [id number]                          ROOM:  ACCOUNT NO:   [de-identified]                           ADMIT DATE: 2020  PROVIDER:     Maximino Genao MD    DATE OF PROCEDURE:  2020    PREOPERATIVE DIAGNOSIS:  History of colon polyps. POSTOPERATIVE DIAGNOSES:  1. Colon polyps. 2.  Diverticulosis of the colon. PROCEDURE:  1.  Fiberoptic colonoscopy up to cecum with snare polypectomy of the  ascending colon. 2.  Placement of endoclips for the hemostasis purposes in the ascending  colon polyp site. 3.  Snare polypectomy of the transverse colon and rectum. 4.  Biopsy polypectomy of the descending colon. SURGEON:  Maximino Genao MD    SEDATION:  MAC. BLOOD LOSS:  Minimal.    OPERATIVE PROCEDURE:  The patient was placed in the left lateral  position. Olympus colonoscope was inserted all the way up to the cecum. Cecum and ileocecal valve were within normal limits. She had a 1.5 cm  flat polyp in the cecum which was removed by electrocautery snare. Two  endoclips were placed for hemostasis purposes. The hemostasis was  satisfactory. Then, coming back in the transverse colon, there was a  1.3 cm polyp seen which was removed by electrocautery snare. A similar  polyp was seen in the rectum, which was also removed by electrocautery  snare. She had 8-mm polyp in the descending colon removed by biopsy  forceps. She did have diverticulosis of the colon. No cancer was seen. Colon mucosa was normal.  She did have hemorrhoids. She tolerated the  procedure well and left the endoscopy room in satisfactory condition.         Jorge Ramirez MD    D: 2020 12:21:29       T: 2020 17:06:11     [id number]  Job#: [id number]     Doc#: [id number]    CC:

## 2020-11-17 NOTE — PROGRESS NOTES
Pt awake, alert and oriented, denies c/o pain or discomfort, cont to deny flattus. Pt encouraged to let air out. Pt tolerating soft drink without nausea. Pt transitioned to phase II level of care.

## 2020-11-17 NOTE — H&P
Subjective: Dipesh Pruett is a 69 y.o. female who was referred for evaluation of previous adenomatous polyps. H/O lower abdominal pain    Patient's medications, allergies, past medical, surgical, social and family histories were reviewed and updated as appropriate. Past medical history:  has a past medical history of Arthritis, CAD (coronary artery disease), Cardiomyopathy ([address] GERD (gastroesophageal reflux disease), Hypertension, IBS (irritable bowel syndrome), and Incontinence of urine. Past surgical history:  has a past surgical history that includes Hysterectomy; Bladder surgery (2010); Colonoscopy; other surgical history (2010); Coronary angioplasty with stent; bladder suspension; and other surgical history. Past social history:  reports that she has been smoking cigarettes. She has a 25.00 pack-year smoking history. She has never used smokeless tobacco. She reports that she does not drink alcohol or use drugs. Past family history: family history includes Heart Disease in her father; High Blood Pressure in her mother; Stroke in her mother. Allergies: No Known Allergies  Current medications:   Current Facility-Administered Medications:     0.9 % sodium chloride infusion, , Intravenous, Continuous, Collin Fuentes MD, Last Rate: 125 mL/hr at 11/17/20 1041    lidocaine PF 1 % injection 1 mL, 1 mL, Intradermal, Once PRN, Collin Fuentes MD    HYDROcodone-acetaminophen (NORCO) 5-325 MG per tablet 1 tablet, 1 tablet, Oral, Once PRN, Collin Fuentes MD    ondansetron [facility]) injection 4 mg, 4 mg, Intravenous, Once PRN, Collin Fuentes MD    Review of Systems  A comprehensive review of systems was negative. Objective:     BP (!) 145/72   Pulse 84   Temp 97.7 °F (36.5 °C) (Temporal)   Resp 16   Ht 5' 1\" (1.549 m)   Wt 140 lb (63.5 kg)   SpO2 99%   BMI 26.45 kg/m²   General appearance: alert, appears stated age and cooperative  Eyes: conjunctivae/corneas clear.  PERRL, EOM's intact. Fundi benign. Ears: normal TM's and external ear canals both ears  Heart: regular rate and rhythm, S1, S2 normal, no murmur, click, rub or gallop  Abdomen: soft, non-tender; bowel sounds normal; no masses,  no organomegaly  Lungs: clear to auscultation bilaterally  Rectal: normal tone    Plan:     1. Colonoscopy.

## 2020-11-17 NOTE — PROGRESS NOTES
Pt admitted to PACU via stretcher from endoscopy. Report received, assessment complete.  Pt awake, alert and oriented, denies c/o pain or discomfort at this time, denies passing flattus

## 2020-11-17 NOTE — ANESTHESIA PRE PROCEDURE
Department of Anesthesiology  Preprocedure Note       Name:  Robert Desai   Age:  69 y.o.  :  1950                                          MRN:  [id number]         Date:  2020      Surgeon: Azul Osorio):  Guerda Leyva MD    Procedure: Procedure(s):  COLONOSCOPY DIAGNOSTIC    Medications prior to admission:   Prior to Admission medications    Medication Sig Start Date End Date Taking? Authorizing Provider   pravastatin (PRAVACHOL) 80 MG tablet Take 80 mg by mouth daily   Yes Historical Provider, MD   famotidine (PEPCID) 20 MG tablet Take 1 tablet by mouth 2 times daily 20   ROMEO Culver CNP   clopidogrel (PLAVIX) 75 MG tablet TAKE 1 TABLET DAILY 20   ROMEO Culver CNP   lisinopril (PRINIVIL;ZESTRIL) 40 MG tablet TAKE 1 TABLET BY MOUTH EVERY DAY 6/10/20   ROMEO Culver CNP   carvedilol (COREG) 25 MG tablet TAKE 1 TABLET TWICE A DAY WITH MEALS 6/10/20   ROMEO Culver CNP   amLODIPine (NORVASC) 10 MG tablet Take 5 mg by mouth    Historical Provider, MD   aspirin 81 MG chewable tablet Take 81 mg by mouth daily.     Historical Provider, MD       Current medications:    Current Facility-Administered Medications   Medication Dose Route Frequency Provider Last Rate Last Dose    HYDROcodone-acetaminophen (NORCO) 5-325 MG per tablet 1 tablet  1 tablet Oral Once PRN Vazquez Bartholomew MD        ondansetron [facility]) injection 4 mg  4 mg Intravenous Once PRN Vazquez Bartholomew MD           Allergies:  No Known Allergies    Problem List:    Patient Active Problem List   Diagnosis Code    CAD (coronary artery disease) I25.10    HTN (hypertension) I10    Hyperlipemia E78.5    Cardiomyopathy ([facility] I42.9    Tobacco abuse Z72.0    Other chest pain R07.89       Past Medical History:        Diagnosis Date    Arthritis     CAD (coronary artery disease)     Cardiomyopathy ([facility]     GERD (gastroesophageal reflux disease)     Hypertension     IBS 09/03/2010    INR 1.11 09/03/2010    APTT 62.0 09/03/2010       HCG (If Applicable): No results found for: PREGTESTUR, PREGSERUM, HCG, HCGQUANT     ABGs: No results found for: PHART, PO2ART, UFN1WYJ, AMF4KCH, BEART, O9WAHVCJ     Type & Screen (If Applicable):  No results found for: LABABO, LABRH    Drug/Infectious Status (If Applicable):  No results found for: HIV, HEPCAB    COVID-19 Screening (If Applicable):   Lab Results   Component Value Date    COVID19 Not Detected 11/11/2020         Anesthesia Evaluation  Patient summary reviewed no history of anesthetic complications:   Airway: Mallampati: II  TM distance: >3 FB   Neck ROM: full  Mouth opening: > = 3 FB Dental:    (+) upper dentures and lower dentures      Pulmonary: breath sounds clear to auscultation  (+) current smoker    (-) wheezes                           Cardiovascular:    (+) hypertension:, CAD:, CABG/stent (stent):, hyperlipidemia        Rhythm: regular  Rate: normal           Beta Blocker:  Dose within 24 Hrs      ROS comment: 2018   Conclusions      Summary   -Left ventricular function is reduced with ejection fraction estimated at   40%. -There is severe hypokinesis of the apical anterioseptal wall.   -Diastolic filling parameters suggest grade I diastolic dysfunction with   normal filling pressures. E/e''=15.5   -The mitral valve is normal in structure . -Mild mitral regurgitation is present. -Mild aortic regurgitation is present. Per cardiology note 6/2020  Signature    Assessment/Plan  1. Coronary artery disease   ~Nuclear stress 1/2019: There is a moderate-large anterior, septal and apical fixed defect c/w scar. There is no significant ischemia. There is anterior, septal and apical hypokinesis with EF 40%. Similar to 2014 study. ~s/p PCI of proximal LAD in 2010  ~On plavix/ASA/BB/Statin   2. Chest pain           ~Pepcid started LOV           ~Pain resolved since starting Pepcid  3.       Hyperlipidemia           ~Lipids (5/7/20): , HDL 57,  Trig 163           ~On Pravachol 80 mg.            ~Being managed by her PCP, she recently increased her dose from 40 to 80  4. Hypertension           ~Controlled on Norvasc, lisinopril, and coreg            ~Does not check BP at home            ~BP today 134/60  5. Cardiomyopathy            ~Stable, no signs of CHF           ~ECHO 4/18 40%           ~Echo 10/13: EF 25-30%. severe hypokinesis of the apical anteroseptal wall. Mild aortic regurgitation is present. Mild mitral regurgitation is present. mild                  tricuspid regurgitatio with RVSP 21 mmHg           ~MUGA 12/10: EF 37%           ~Echo 1/25/10: EF 20-25%, mild AR, decreased diastolic function           ~MUGA 6/14 39%           ~GXT 1/2019 40%            ~On Coreg/ACE  6.      Dizziness           ~negative orthostatics          Neuro/Psych:      (-) seizures, TIA and CVA           GI/Hepatic/Renal:   (+) GERD: well controlled,          ROS comment: No gerd sx today  IBS. Endo/Other:                     Abdominal:           Vascular:                                    Anesthesia Plan      TIVA     ASA 3     (Patient verbalizes understanding that there is the possibility of recall with TIVA. Patient verbalizes her wishes to proceed with planned anesthetic.)  Induction: intravenous. Anesthetic plan and risks discussed with patient. Plan discussed with CRNA.                   Tawanna Rodriguez MD   11/17/2020

## 2020-11-19 ENCOUNTER — ANESTHESIA EVENT (OUTPATIENT)
Dept: OPERATING ROOM | Age: 70
End: 2020-11-19
Payer: MEDICARE

## 2020-11-19 ENCOUNTER — ANESTHESIA (OUTPATIENT)
Dept: OPERATING ROOM | Age: 70
End: 2020-11-19
Payer: MEDICARE

## 2020-11-19 ENCOUNTER — HOSPITAL ENCOUNTER (OUTPATIENT)
Age: 70
Setting detail: OUTPATIENT SURGERY
Discharge: HOME | End: 2020-11-19
Attending: SURGERY | Admitting: SURGERY
Payer: MEDICARE

## 2020-11-19 VITALS
BODY MASS INDEX: 25.95 KG/M2 | HEIGHT: 62 IN | HEART RATE: 90 BPM | RESPIRATION RATE: 15 BRPM | SYSTOLIC BLOOD PRESSURE: 167 MMHG | OXYGEN SATURATION: 100 % | DIASTOLIC BLOOD PRESSURE: 76 MMHG | WEIGHT: 141 LBS | TEMPERATURE: 98 F

## 2020-11-19 VITALS
RESPIRATION RATE: 20 BRPM | OXYGEN SATURATION: 100 % | DIASTOLIC BLOOD PRESSURE: 61 MMHG | SYSTOLIC BLOOD PRESSURE: 118 MMHG

## 2020-11-19 LAB
ANION GAP SERPL CALCULATED.3IONS-SCNC: 12 MMOL/L (ref 3–16)
BUN BLDV-MCNC: 8 MG/DL (ref 7–20)
CALCIUM SERPL-MCNC: 9.7 MG/DL (ref 8.3–10.6)
CHLORIDE BLD-SCNC: 104 MMOL/L (ref 99–110)
CO2: 25 MMOL/L (ref 21–32)
CREAT SERPL-MCNC: 0.6 MG/DL (ref 0.6–1.2)
GFR AFRICAN AMERICAN: >60
GFR NON-AFRICAN AMERICAN: >60
GLUCOSE BLD-MCNC: 114 MG/DL (ref 70–99)
HCT VFR BLD CALC: 41.9 % (ref 36–48)
HEMOGLOBIN: 14.1 G/DL (ref 12–16)
MCH RBC QN AUTO: 31.2 PG (ref 26–34)
MCHC RBC AUTO-ENTMCNC: 33.7 G/DL (ref 31–36)
MCV RBC AUTO: 92.5 FL (ref 80–100)
PDW BLD-RTO: 13.2 % (ref 12.4–15.4)
PLATELET # BLD: 192 K/UL (ref 135–450)
PMV BLD AUTO: 7.3 FL (ref 5–10.5)
POTASSIUM SERPL-SCNC: 4.1 MMOL/L (ref 3.5–5.1)
RBC # BLD: 4.53 M/UL (ref 4–5.2)
SODIUM BLD-SCNC: 141 MMOL/L (ref 136–145)
WBC # BLD: 5.7 K/UL (ref 4–11)

## 2020-11-19 PROCEDURE — 3700000000 HC ANESTHESIA ATTENDED CARE: Performed by: SURGERY

## 2020-11-19 PROCEDURE — 2709999900 HC NON-CHARGEABLE SUPPLY: Performed by: SURGERY

## 2020-11-19 PROCEDURE — 6360000002 HC RX W HCPCS: Performed by: NURSE ANESTHETIST, CERTIFIED REGISTERED

## 2020-11-19 PROCEDURE — 2500000003 HC RX 250 WO HCPCS: Performed by: NURSE ANESTHETIST, CERTIFIED REGISTERED

## 2020-11-19 PROCEDURE — 6370000000 HC RX 637 (ALT 250 FOR IP): Performed by: SURGERY

## 2020-11-19 PROCEDURE — C9290 INJ, BUPIVACAINE LIPOSOME: HCPCS | Performed by: SURGERY

## 2020-11-19 PROCEDURE — 7100000001 HC PACU RECOVERY - ADDTL 15 MIN: Performed by: SURGERY

## 2020-11-19 PROCEDURE — 2500000003 HC RX 250 WO HCPCS: Performed by: SURGERY

## 2020-11-19 PROCEDURE — 88304 TISSUE EXAM BY PATHOLOGIST: CPT

## 2020-11-19 PROCEDURE — 2580000003 HC RX 258: Performed by: ANESTHESIOLOGY

## 2020-11-19 PROCEDURE — 80048 BASIC METABOLIC PNL TOTAL CA: CPT

## 2020-11-19 PROCEDURE — 2580000003 HC RX 258: Performed by: SURGERY

## 2020-11-19 PROCEDURE — 7100000010 HC PHASE II RECOVERY - FIRST 15 MIN: Performed by: SURGERY

## 2020-11-19 PROCEDURE — 6360000002 HC RX W HCPCS: Performed by: SURGERY

## 2020-11-19 PROCEDURE — 3600000012 HC SURGERY LEVEL 2 ADDTL 15MIN: Performed by: SURGERY

## 2020-11-19 PROCEDURE — 6370000000 HC RX 637 (ALT 250 FOR IP): Performed by: ANESTHESIOLOGY

## 2020-11-19 PROCEDURE — 7100000011 HC PHASE II RECOVERY - ADDTL 15 MIN: Performed by: SURGERY

## 2020-11-19 PROCEDURE — 3600000002 HC SURGERY LEVEL 2 BASE: Performed by: SURGERY

## 2020-11-19 PROCEDURE — 7100000000 HC PACU RECOVERY - FIRST 15 MIN: Performed by: SURGERY

## 2020-11-19 PROCEDURE — 3700000001 HC ADD 15 MINUTES (ANESTHESIA): Performed by: SURGERY

## 2020-11-19 PROCEDURE — 85027 COMPLETE CBC AUTOMATED: CPT

## 2020-11-19 RX ORDER — MAGNESIUM HYDROXIDE 1200 MG/15ML
LIQUID ORAL
Status: COMPLETED | OUTPATIENT
Start: 2020-11-19 | End: 2020-11-19

## 2020-11-19 RX ORDER — OXYCODONE HYDROCHLORIDE AND ACETAMINOPHEN 5; 325 MG/1; MG/1
1 TABLET ORAL
Status: COMPLETED | OUTPATIENT
Start: 2020-11-19 | End: 2020-11-19

## 2020-11-19 RX ORDER — FENTANYL CITRATE 50 UG/ML
50 INJECTION, SOLUTION INTRAMUSCULAR; INTRAVENOUS EVERY 5 MIN PRN
Status: DISCONTINUED | OUTPATIENT
Start: 2020-11-19 | End: 2020-11-19 | Stop reason: HOSPADM

## 2020-11-19 RX ORDER — BUPIVACAINE HYDROCHLORIDE AND EPINEPHRINE 5; 5 MG/ML; UG/ML
INJECTION, SOLUTION EPIDURAL; INTRACAUDAL; PERINEURAL
Status: COMPLETED | OUTPATIENT
Start: 2020-11-19 | End: 2020-11-19

## 2020-11-19 RX ORDER — PROPOFOL 10 MG/ML
INJECTION, EMULSION INTRAVENOUS PRN
Status: DISCONTINUED | OUTPATIENT
Start: 2020-11-19 | End: 2020-11-19 | Stop reason: SDUPTHER

## 2020-11-19 RX ORDER — PROPOFOL 10 MG/ML
INJECTION, EMULSION INTRAVENOUS CONTINUOUS PRN
Status: DISCONTINUED | OUTPATIENT
Start: 2020-11-19 | End: 2020-11-19 | Stop reason: SDUPTHER

## 2020-11-19 RX ORDER — HYDROMORPHONE HCL 110MG/55ML
0.5 PATIENT CONTROLLED ANALGESIA SYRINGE INTRAVENOUS EVERY 5 MIN PRN
Status: DISCONTINUED | OUTPATIENT
Start: 2020-11-19 | End: 2020-11-19 | Stop reason: HOSPADM

## 2020-11-19 RX ORDER — SODIUM CHLORIDE 9 MG/ML
INJECTION, SOLUTION INTRAVENOUS CONTINUOUS
Status: DISCONTINUED | OUTPATIENT
Start: 2020-11-19 | End: 2020-11-19 | Stop reason: HOSPADM

## 2020-11-19 RX ORDER — FENTANYL CITRATE 50 UG/ML
INJECTION, SOLUTION INTRAMUSCULAR; INTRAVENOUS PRN
Status: DISCONTINUED | OUTPATIENT
Start: 2020-11-19 | End: 2020-11-19 | Stop reason: SDUPTHER

## 2020-11-19 RX ORDER — FENTANYL CITRATE 50 UG/ML
25 INJECTION, SOLUTION INTRAMUSCULAR; INTRAVENOUS EVERY 5 MIN PRN
Status: DISCONTINUED | OUTPATIENT
Start: 2020-11-19 | End: 2020-11-19 | Stop reason: HOSPADM

## 2020-11-19 RX ORDER — HYDROMORPHONE HCL 110MG/55ML
0.25 PATIENT CONTROLLED ANALGESIA SYRINGE INTRAVENOUS EVERY 5 MIN PRN
Status: DISCONTINUED | OUTPATIENT
Start: 2020-11-19 | End: 2020-11-19 | Stop reason: HOSPADM

## 2020-11-19 RX ORDER — LABETALOL HYDROCHLORIDE 5 MG/ML
5 INJECTION, SOLUTION INTRAVENOUS EVERY 10 MIN PRN
Status: DISCONTINUED | OUTPATIENT
Start: 2020-11-19 | End: 2020-11-19 | Stop reason: HOSPADM

## 2020-11-19 RX ORDER — ONDANSETRON 2 MG/ML
4 INJECTION INTRAMUSCULAR; INTRAVENOUS
Status: DISCONTINUED | OUTPATIENT
Start: 2020-11-19 | End: 2020-11-19 | Stop reason: HOSPADM

## 2020-11-19 RX ORDER — KETOROLAC TROMETHAMINE 30 MG/ML
INJECTION, SOLUTION INTRAMUSCULAR; INTRAVENOUS PRN
Status: DISCONTINUED | OUTPATIENT
Start: 2020-11-19 | End: 2020-11-19 | Stop reason: SDUPTHER

## 2020-11-19 RX ORDER — LIDOCAINE HYDROCHLORIDE 20 MG/ML
INJECTION, SOLUTION EPIDURAL; INFILTRATION; INTRACAUDAL; PERINEURAL PRN
Status: DISCONTINUED | OUTPATIENT
Start: 2020-11-19 | End: 2020-11-19 | Stop reason: SDUPTHER

## 2020-11-19 RX ADMIN — PROPOFOL 10 MG: 10 INJECTION, EMULSION INTRAVENOUS at 14:29

## 2020-11-19 RX ADMIN — OXYCODONE HYDROCHLORIDE AND ACETAMINOPHEN 1 TABLET: 5; 325 TABLET ORAL at 15:42

## 2020-11-19 RX ADMIN — LIDOCAINE HYDROCHLORIDE 40 MG: 20 INJECTION, SOLUTION EPIDURAL; INFILTRATION; INTRACAUDAL; PERINEURAL at 14:24

## 2020-11-19 RX ADMIN — PROPOFOL 10 MG: 10 INJECTION, EMULSION INTRAVENOUS at 14:24

## 2020-11-19 RX ADMIN — FENTANYL CITRATE 25 MCG: 50 INJECTION, SOLUTION INTRAMUSCULAR; INTRAVENOUS at 14:24

## 2020-11-19 RX ADMIN — PROPOFOL 20 MG: 10 INJECTION, EMULSION INTRAVENOUS at 14:31

## 2020-11-19 RX ADMIN — KETOROLAC TROMETHAMINE 15 MG: 60 INJECTION, SOLUTION INTRAMUSCULAR at 15:08

## 2020-11-19 RX ADMIN — PROPOFOL 20 MG: 10 INJECTION, EMULSION INTRAVENOUS at 14:53

## 2020-11-19 RX ADMIN — PROPOFOL 10 MG: 10 INJECTION, EMULSION INTRAVENOUS at 14:26

## 2020-11-19 RX ADMIN — FENTANYL CITRATE 25 MCG: 50 INJECTION, SOLUTION INTRAMUSCULAR; INTRAVENOUS at 14:30

## 2020-11-19 RX ADMIN — PROPOFOL 100 MCG/KG/MIN: 10 INJECTION, EMULSION INTRAVENOUS at 14:24

## 2020-11-19 RX ADMIN — SODIUM CHLORIDE: 9 INJECTION, SOLUTION INTRAVENOUS at 13:24

## 2020-11-19 ASSESSMENT — PULMONARY FUNCTION TESTS
PIF_VALUE: 0
PIF_VALUE: 1
PIF_VALUE: 0
PIF_VALUE: 1
PIF_VALUE: 0
PIF_VALUE: 1
PIF_VALUE: 0
PIF_VALUE: 1
PIF_VALUE: 0
PIF_VALUE: 1
PIF_VALUE: 0
PIF_VALUE: 1
PIF_VALUE: 0
PIF_VALUE: 1
PIF_VALUE: 0
PIF_VALUE: 1
PIF_VALUE: 1
PIF_VALUE: 0
PIF_VALUE: 1
PIF_VALUE: 0
PIF_VALUE: 1
PIF_VALUE: 0

## 2020-11-19 ASSESSMENT — PAIN - FUNCTIONAL ASSESSMENT: PAIN_FUNCTIONAL_ASSESSMENT: 0-10

## 2020-11-19 ASSESSMENT — PAIN SCALES - GENERAL: PAINLEVEL_OUTOF10: 4

## 2020-11-19 ASSESSMENT — PAIN DESCRIPTION - PAIN TYPE: TYPE: SURGICAL PAIN

## 2020-11-19 ASSESSMENT — LIFESTYLE VARIABLES: SMOKING_STATUS: 1

## 2020-11-19 NOTE — H&P
Subjective: Nav Swain is a 69 y.o. female who was referred for external and internal hemorrhoids prolapsing and bleeding    Patient's medications, allergies, past medical, surgical, social and family histories were reviewed and updated as appropriate. Past medical history:  has a past medical history of Arthritis, CAD (coronary artery disease), Cardiomyopathy ([address] GERD (gastroesophageal reflux disease), Hypertension, IBS (irritable bowel syndrome), and Incontinence of urine. Past surgical history:  has a past surgical history that includes Hysterectomy; Bladder surgery (2010); Colonoscopy; other surgical history (2010); Coronary angioplasty with stent; bladder suspension; other surgical history; Colonoscopy (N/A, 11/17/2020); and Colonoscopy (11/17/2020). Past social history:  reports that she has been smoking cigarettes. She has a 25.00 pack-year smoking history. She has never used smokeless tobacco. She reports that she does not drink alcohol or use drugs. Past family history: family history includes Heart Disease in her father; High Blood Pressure in her mother; Stroke in her mother. Allergies: No Known Allergies  Current medications:   Current Facility-Administered Medications:     0.9 % sodium chloride infusion, , Intravenous, Continuous, Yohannes John MD, Last Rate: 100 mL/hr at 11/19/20 1324    Review of Systems  A comprehensive review of systems was negative. Objective:     BP (!) 155/81   Pulse 79   Temp 98.9 °F (37.2 °C) (Temporal)   Resp 16   Ht 5' 2\" (1.575 m)   Wt 141 lb (64 kg)   SpO2 94%   BMI 25.79 kg/m²   General appearance: alert, appears stated age and cooperative  Eyes: conjunctivae/corneas clear. PERRL, EOM's intact. Fundi benign.   Ears: normal TM's and external ear canals both ears  Heart: regular rate and rhythm, S1, S2 normal, no murmur, click, rub or gallop  Abdomen: soft, non-tender; bowel sounds normal; no masses,  no organomegaly  Lungs: clear to auscultation bilaterally  Rectal: normal tone Plapsing hemorrhoids external and internal    Plan:     1.  Hemorrhoidectomy

## 2020-11-19 NOTE — ANESTHESIA POSTPROCEDURE EVALUATION
Department of Anesthesiology  Postprocedure Note    Patient: Carrie Kirk  MRN: [id number]  YOB: 1950  Date of evaluation: 11/19/2020  Time:  3:21 PM     Procedure Summary     Date:  11/19/20 Room / Location:  [address]    Anesthesia Start:  2732 Anesthesia Stop:  1518    Procedure:  HEMORRHOIDECTOMY (N/A ) Diagnosis:  (T57.(2,5,1) HEMORRHOIDS)    Surgeon:  Nadja Peoples MD Responsible Provider:  Christina Collins MD    Anesthesia Type:  MAC, general ASA Status:  3          Anesthesia Type: MAC, general    Yesenia Phase I: Yesenia Score: 10    Yesenia Phase II:      Last vitals: Reviewed and per EMR flowsheets.        Anesthesia Post Evaluation    Patient location during evaluation: PACU  Level of consciousness: awake  Complications: no  Cardiovascular status: hemodynamically stable  Respiratory status: acceptable

## 2020-11-19 NOTE — ANESTHESIA PRE PROCEDURE
Department of Anesthesiology  Preprocedure Note       Name:  Domonique Bhatt   Age:  69 y.o.  :  1950                                          MRN:  [id number]         Date:  2020      Surgeon: Jennifer Cooper):  Ilan Nunez MD    Procedure: Procedure(s): HEMORRHOIDECTOMY    Medications prior to admission:   Prior to Admission medications    Medication Sig Start Date End Date Taking?  Authorizing Provider   pravastatin (PRAVACHOL) 80 MG tablet Take 80 mg by mouth daily   Yes Historical Provider, MD   famotidine (PEPCID) 20 MG tablet Take 1 tablet by mouth 2 times daily 20  Yes ROMEO Arteaga CNP   lisinopril (PRINIVIL;ZESTRIL) 40 MG tablet TAKE 1 TABLET BY MOUTH EVERY DAY 6/10/20  Yes ROMEO Arteaga CNP   carvedilol (COREG) 25 MG tablet TAKE 1 TABLET TWICE A DAY WITH MEALS 6/10/20  Yes ROMEO Arteaga CNP   amLODIPine (NORVASC) 10 MG tablet Take 5 mg by mouth   Yes Historical Provider, MD   clopidogrel (PLAVIX) 75 MG tablet TAKE 1 TABLET DAILY 20   ROMEO Arteaga CNP       Current medications:    Current Facility-Administered Medications   Medication Dose Route Frequency Provider Last Rate Last Dose    0.9 % sodium chloride infusion   Intravenous Continuous Nina Ellsworth  mL/hr at 20 1324         Allergies:  No Known Allergies    Problem List:    Patient Active Problem List   Diagnosis Code    CAD (coronary artery disease) I25.10    HTN (hypertension) I10    Hyperlipemia E78.5    Cardiomyopathy ([address] I42.9    Tobacco abuse Z72.0    Other chest pain R07.89       Past Medical History:        Diagnosis Date    Arthritis     CAD (coronary artery disease)     Cardiomyopathy ([address]     GERD (gastroesophageal reflux disease)     Hypertension     IBS (irritable bowel syndrome)     Incontinence of urine        Past Surgical History:        Procedure Laterality Date    BLADDER SURGERY      sling    BLADDER SUSPENSION      COLONOSCOPY      COLONOSCOPY N/A 11/17/2020    COLONOSCOPY POLYPECTOMY SNARE/COLD BIOPSY performed by Abigail Boudreaux MD at [facility] COLONOSCOPY  11/17/2020    COLONOSCOPY WITH BIOPSY performed by Abigail Boudreaux MD at [address]      OTHER SURGICAL HISTORY  2010    coronary stent to LAD    OTHER SURGICAL HISTORY      TVT, A/P REPAIR        Social History:    Social History     Tobacco Use    Smoking status: Current Every Day Smoker     Packs/day: 0.50     Years: 50.00     Pack years: 25.00     Types: Cigarettes    Smokeless tobacco: Never Used   Substance Use Topics    Alcohol use: No                                Ready to quit: No  Counseling given: Yes      Vital Signs (Current):   Vitals:    11/19/20 1315   BP: (!) 155/81   Pulse: 79   Resp: 16   Temp: 98.9 °F (37.2 °C)   TempSrc: Temporal   SpO2: 94%   Weight: 141 lb (64 kg)   Height: 5' 2\" (1.575 m)                                              BP Readings from Last 3 Encounters:   11/19/20 (!) 155/81   11/17/20 116/80   11/17/20 132/63       NPO Status: Time of last liquid consumption: 0800                        Time of last solid consumption: 2300                        Date of last liquid consumption: 11/19/20                        Date of last solid food consumption: 11/18/20    BMI:   Wt Readings from Last 3 Encounters:   11/19/20 141 lb (64 kg)   11/17/20 140 lb (63.5 kg)   06/24/20 137 lb (62.1 kg)     Body mass index is 25.79 kg/m².     CBC:   Lab Results   Component Value Date    WBC 5.7 11/19/2020    RBC 4.53 11/19/2020    HGB 14.1 11/19/2020    HCT 41.9 11/19/2020    MCV 92.5 11/19/2020    RDW 13.2 11/19/2020     11/19/2020       CMP:   Lab Results   Component Value Date     09/04/2010    K 4.4 09/04/2010     09/04/2010    CO2 24 03/17/2015    BUN 12 09/04/2010    CREATININE 0.7 03/17/2015    CREATININE 0.7 09/04/2010    GFRAA >60 CRNA.            MEDICATIONS:  Current Facility-Administered Medications   Medication Dose Route Frequency Provider Last Rate Last Dose    0.9 % sodium chloride infusion   Intravenous Continuous Michael Manning  mL/hr at 11/19/20 1324          LABS:  Lab Results   Component Value Date    WBC 5.7 11/19/2020    HGB 14.1 11/19/2020    HCT 41.9 11/19/2020    MCV 92.5 11/19/2020     11/19/2020    GLUCOSE 114 (H) 11/19/2020    PROTIME 11.8 09/03/2010    INR 1.11 09/03/2010    APTT 62.0 (H) 09/03/2010       Lab Results   Component Value Date     11/19/2020    K 4.1 11/19/2020     11/19/2020    CO2 25 11/19/2020    BUN 8 11/19/2020    CREATININE 0.6 11/19/2020       Problem List:  Patient Active Problem List   Diagnosis    CAD (coronary artery disease)    HTN (hypertension)    Hyperlipemia    [facility]    Tobacco abuse    Other chest pain         Manjinder Hardy MD   11/19/2020

## 2020-11-19 NOTE — PROGRESS NOTES
Received from or - drowsy,nasal cannula,rectal area with dry bloody drainage and packing noted no active bleeding,vss,warm blanket given.

## 2020-11-19 NOTE — BRIEF OP NOTE
Brief Postoperative Note      Patient: Kathleen Hussein  YOB: 1950  MRN: [id number]    Date of Procedure: 11/19/2020    Pre-Op Diagnosis: K64.(1,2,3) HEMORRHOIDS 4 the degree    Post-Op Diagnosis: Same       Procedure(s):   HEMORRHOIDECTOMY external and internal    Surgeon(s):  Polina Allen MD    Assistant:  Surgical Assistant: Luis Alfredo Tamez Assistant: Jony Melchor    Anesthesia: Monitor Anesthesia Care    Estimated Blood Loss (mL): Less than 10 ml    Complications: None    Specimens:   ID Type Source Tests Collected by Time Destination   A : A.HEMORRHOIDS Tissue Tissue SURGICAL PATHOLOGY Polina Allen MD 11/19/2020 1440        Implants:  * No implants in log *      Drains: * No LDAs found *    Findings: as above    Electronically signed by Polina Allen MD on 11/19/2020 at 3:13 PM

## 2020-11-19 NOTE — PROGRESS NOTES
Teaching/ education completed for home care including pain management, wound care,ACTIVITY ,SAFETY PRECAUTIONS and infection control. Patient verbalized understanding.

## 2020-11-19 NOTE — PROGRESS NOTES
Dr. Wei Caller in to see patient. Declines antibiotic for this procedure. All patient questions answered.   Kathy Kohli RN

## 2020-11-19 NOTE — PROGRESS NOTES
Phase 2 - awake,room air,consuming po intake,pain pill given for moderate surgical discomfort,Dr. Jean Claude Blas at bedside gave patient 2 prescriptions and spoke with patient.

## 2020-11-19 NOTE — OP NOTE
[facility]                     [address] [facility], [address] [facility]                                OPERATIVE REPORT    PATIENT NAME: Zaid Bello                     :        1950  MED REC NO:   [id number]                          ROOM:  ACCOUNT NO:   [de-identified]                           ADMIT DATE: 2020  PROVIDER:     Ministerio Lizarraga MD    DATE OF PROCEDURE:  2020    PREOPERATIVE DIAGNOSIS:  Fourth degree prolapsing internal hemorrhoid  and external hemorrhoid. POSTOPERATIVE DIAGNOSIS:  Fourth degree prolapsing internal hemorrhoid  and external hemorrhoid. OPERATION PERFORMED:  External and internal hemorrhoidectomy. SURGEON:  Ministerio Lizarraga MD    ANESTHESIA:  Marcaine 0.5% with epinephrine 1:200,000 with Exparel  injected after the procedure, 20 mL of Exparel and 10 mL of normal  saline in the perianal area for long-term local anesthesia. BLOOD LOSS:  Less than 10 mL. OPERATIVE PROCEDURE:  The patient was placed in the jackknife position  and her buttocks were taped on to the table in a regular fashion. Then,  she was prepped with Betadine and draped. Marcaine anesthesia was  infiltrated first in the perianal region, then in the anal canal.  _____  found to be satisfactory. The patient had a fourth-degree prolapsing  large internal hemorrhoid along with external hemorrhoid on the right  anterolateral quadrant. It was excised first with sharp scissor  dissection making sure that the internal sphincter was intact. Hemostasis was obtained by electrocautery. The wound was partially  approximated with 3-0 Vicryl running suture. Similar procedure was  performed on the left lateral quadrant and again the hemostasis was  obtained by electrocautery. The wound was partially approximated with  3-0 Vicryl running suture.   On the right lateral quadrant, again the  internal group of hemorrhoid was excised and the wound was partially  approximated with 3-0 Vicryl running suture. The hemostasis was  satisfactory. The Exparel was injected for long-term local anesthesia. The Surgicel was applied. The patient tolerated the procedure well and  left the operating table in satisfactory condition.         Jose Luis Merrill MD    D: 11/19/2020 15:42:43       T: 11/19/2020 18:00:24     [id number]  Job#: [id number]     Doc#: [id number]    CC:

## 2020-12-07 NOTE — TELEPHONE ENCOUNTER
Refill was requested from pharmacy. Patient is up to date with appointments and lab work.     06/24/2020 Ov with NPKA    Plan  1. Check carotid duplex  2. No medication changes at this time  3. Encouraged regular exercise.      Will schedule appointment with Dr. Lupis Cohen in 6 months. Patient lives closer to [facility] and does not want to drive to [facility] anymore.

## 2020-12-08 RX ORDER — LISINOPRIL 40 MG/1
TABLET ORAL
Qty: 90 TABLET | Refills: 1 | Status: SHIPPED | OUTPATIENT
Start: 2020-12-08 | End: 2021-06-07 | Stop reason: SDUPTHER

## 2020-12-16 ENCOUNTER — TELEPHONE (OUTPATIENT)
Dept: CARDIOLOGY CLINIC | Age: 70
End: 2020-12-16

## 2020-12-16 RX ORDER — PRAVASTATIN SODIUM 80 MG/1
80 TABLET ORAL DAILY
Qty: 90 TABLET | Refills: 1 | Status: SHIPPED | OUTPATIENT
Start: 2020-12-16

## 2020-12-16 NOTE — TELEPHONE ENCOUNTER
Spoke to the pt-advised to have blood work done. The lab orders are in the chart. E-prescribed the Pravastatin 80 mg, # 90, one tab po daily, into [facility], [phone].

## 2020-12-16 NOTE — TELEPHONE ENCOUNTER
Medication Refill    Medication needing refilled:pravastatin (PRAVACHOL)_ send to [facility]     Dosage of the medication: 80 mg     How are you taking this medication (QD, BID, TID, QID, PRN): once daily    30 or 90 day supply called in: 90 day supply    Which Pharmacy are we sending the medication to?: [facility] [address] [place] [address], [address] [place] [address]   Phone:  [phone]  Fax:  [phone]

## 2020-12-23 NOTE — ASSESSMENT & PLAN NOTE
Symptomatic-> no  NYHA score-> 1  Systolic   EF~ 68% (improved from 25-30%)  Ischemic   Current HF meds~ coreg / lisinopril  Plan~ Continue current plan

## 2020-12-23 NOTE — PROGRESS NOTES
[address]   Cardiac Evaluation      Patient: Wil Rose  YOB: 1950       No chief complaint on file. Referring provider: No primary care provider on file. History of Present Illness:   Ms. Jose Nieto is a 69 y.o. female here for follow up. Her history includes coronary disease with stent placed to proximal LAD 9/10, cardiomyopathy, hypertension, and hyperlipidemia. Gwenyth Quest performed 12/10 revealed at EF of 37%. Echo 10/13 revealed EF 25-30%. She did have GXT 12/14 and EF was 44 % , ECHO 4/18 showed EF 40%, she has not had any exacerbations of CHF. Ms Jose Nieto continues to smoke 1 PPD. At her last ov with Trish Miguel NP,  her symptoms have resolved since adding the Pepcid LOV. She also has not had as much personal stress in her life since her granddaughter moved out with her baby and boyfriend. Today,     Chest Pain: Location ***, Severity ***, radiation ***, movement or rest ***, duration ***, frequency ***, progression ***  Dyspnea: Severity ***, movement or rest ***, duration ***, frequency ***, progression ***, edema***, weight gain ***    Family history of heart disease: ***  Smoking History: ***  Exercise, activity level: ***  Heart Healthy Diet: ***    With regard to medication therapy he/she has been compliant with prescribed regimen and has tolerated therapy to date. Past Medical History:   has a past medical history of Arthritis, CAD (coronary artery disease), Cardiomyopathy ([address] GERD (gastroesophageal reflux disease), Hypertension, IBS (irritable bowel syndrome), and Incontinence of urine. Surgical History:   has a past surgical history that includes Hysterectomy; Bladder surgery (2010); Colonoscopy; other surgical history (2010); Coronary angioplasty with stent; bladder suspension; other surgical history; Colonoscopy (N/A, 11/17/2020); Colonoscopy (11/17/2020); and Hemorrhoid surgery (N/A, 11/19/2020).      Current Outpatient Medications Medication Sig Dispense Refill    pravastatin (PRAVACHOL) 80 MG tablet Take 1 tablet by mouth daily 90 tablet 1    lisinopril (PRINIVIL;ZESTRIL) 40 MG tablet TAKE 1 TABLET BY MOUTH EVERY DAY 90 tablet 1    famotidine (PEPCID) 20 MG tablet Take 1 tablet by mouth 2 times daily 60 tablet 3    clopidogrel (PLAVIX) 75 MG tablet TAKE 1 TABLET DAILY 90 tablet 3    carvedilol (COREG) 25 MG tablet TAKE 1 TABLET TWICE A DAY WITH MEALS 180 tablet 3    amLODIPine (NORVASC) 10 MG tablet Take 5 mg by mouth       No current facility-administered medications for this visit. Allergies:  Patient has no known allergies. Social History:  Social History     Socioeconomic History    Marital status:       Spouse name: Not on file    Number of children: Not on file    Years of education: Not on file    Highest education level: Not on file   Occupational History    Not on file   Social Needs    Financial resource strain: Not on file    Food insecurity     Worry: Not on file     Inability: Not on file    Transportation needs     Medical: Not on file     Non-medical: Not on file   Tobacco Use    Smoking status: Current Every Day Smoker     Packs/day: 0.50     Years: 50.00     Pack years: 25.00     Types: Cigarettes    Smokeless tobacco: Never Used   Substance and Sexual Activity    Alcohol use: No    Drug use: No    Sexual activity: Not on file   Lifestyle    Physical activity     Days per week: Not on file     Minutes per session: Not on file    Stress: Not on file   Relationships    Social connections     Talks on phone: Not on file     Gets together: Not on file     Attends Zoroastrianism service: Not on file     Active member of club or organization: Not on file     Attends meetings of clubs or organizations: Not on file     Relationship status: Not on file    Intimate partner violence     Fear of current or ex partner: Not on file     Emotionally abused: Not on file     Physically abused: Not on file · NECK: Supple, JVP normal, thyroid not enlarged. Carotids 2+ without bruits  · RESPIRATORY: Normal effort, no rales or rhonchi  · CARDIOVASCULAR: Normal PMI, regular rate and rhythm, no murmurs, rub or gallop. No edema. Radial pulses present and equal  · CHEST: No scar or masses  · ABDOMEN: Normal bowel sounds. No masses or tenderness. No bruit  · MUSCULOSKELETAL: No clubbing or cyanosis. Moves all extremities well. Normal gait  · SKIN:  Warm and dry. No rashes  · NEUROLOGIC: Cranial nerves intact. Alert and oriented  · PSYCHIATRIC: Calm affect. Appears to have normal judgement and insight    All testing and labs listed below were personally reviewed by myself. Stress Jan '19  Summary  There is a moderate-large anterior, septal and apical fixed defect c/w scar. There is no significant ischemia. There is anterior, septal and apical hypokinesis with EF=40%  Intermediate risk. Similar to 2014 study     Echo~ April '18  Summary  Left ventricular function is reduced with ejection fraction estimated at 40%. There is severe hypokinesis of the apical anterioseptal wall. Diastolic filling parameters suggest grade I diastolic dysfunction with normal filling pressures. E/e''=15.5  The mitral valve is normal in structure . Mild mitral regurgitation is present. Mild aortic regurgitation is present. Assessment/Plan  1. Coronary artery disease involving native heart without angina pectoris, unspecified vessel or lesion type    2. Essential hypertension    3. Hyperlipidemia, unspecified hyperlipidemia type    4. Cardiomyopathy, unspecified type ([facility] [address]    5. Tobacco abuse          CAD (coronary artery disease)  Angina yes or no, then classify as atypical or typical  CCS class 1-4  Intervention~ PCI to LAD 2010  Current meds~ plavix  Plan~normal stress 2019      HTN (hypertension)  Controlled?   Meds~ amlodipine  Plan~ stable    Hyperlipemia  LDL~ not on file  Date of last lipid panel  Meds~ pravastatin Plan~ get lipid panel    Cardiomyopathy  Symptomatic? NYHA score-> 1-4  Systolic   EF~ 07% (improved from 25-30%)  Ischemic   Current HF meds~ coreg / lisinopril  Plan~     Tobacco abuse  Current smoker~ yes  How much\~ 1 PPD  Trying to quit? Counseled~ yes      No orders of the defined types were placed in this encounter. Miguel Rodriguez MD      Thank you for allowing to me to participate in the care of Ella Gray. Scribe's Attestation: This note was scribed in the presence of Dr. Clare Chandra MD by Alisha Carter RN.

## 2021-01-04 ENCOUNTER — OFFICE VISIT (OUTPATIENT)
Dept: CARDIOLOGY CLINIC | Age: 71
End: 2021-01-04
Payer: MEDICARE

## 2021-01-04 VITALS
DIASTOLIC BLOOD PRESSURE: 82 MMHG | BODY MASS INDEX: 27.3 KG/M2 | HEIGHT: 61 IN | OXYGEN SATURATION: 96 % | HEART RATE: 78 BPM | SYSTOLIC BLOOD PRESSURE: 138 MMHG | WEIGHT: 144.6 LBS | RESPIRATION RATE: 18 BRPM

## 2021-01-04 DIAGNOSIS — E78.5 HYPERLIPIDEMIA, UNSPECIFIED HYPERLIPIDEMIA TYPE: ICD-10-CM

## 2021-01-04 DIAGNOSIS — I10 ESSENTIAL HYPERTENSION: ICD-10-CM

## 2021-01-04 DIAGNOSIS — Z72.0 TOBACCO ABUSE: ICD-10-CM

## 2021-01-04 DIAGNOSIS — I25.10 CORONARY ARTERY DISEASE INVOLVING NATIVE HEART WITHOUT ANGINA PECTORIS, UNSPECIFIED VESSEL OR LESION TYPE: Primary | ICD-10-CM

## 2021-01-04 DIAGNOSIS — I42.9 CARDIOMYOPATHY, UNSPECIFIED TYPE (HCC): ICD-10-CM

## 2021-01-04 PROCEDURE — 4040F PNEUMOC VAC/ADMIN/RCVD: CPT | Performed by: INTERNAL MEDICINE

## 2021-01-04 PROCEDURE — 99214 OFFICE O/P EST MOD 30 MIN: CPT | Performed by: INTERNAL MEDICINE

## 2021-01-04 PROCEDURE — 3017F COLORECTAL CA SCREEN DOC REV: CPT | Performed by: INTERNAL MEDICINE

## 2021-01-04 PROCEDURE — G8417 CALC BMI ABV UP PARAM F/U: HCPCS | Performed by: INTERNAL MEDICINE

## 2021-01-04 PROCEDURE — 4004F PT TOBACCO SCREEN RCVD TLK: CPT | Performed by: INTERNAL MEDICINE

## 2021-01-04 PROCEDURE — G8484 FLU IMMUNIZE NO ADMIN: HCPCS | Performed by: INTERNAL MEDICINE

## 2021-01-04 PROCEDURE — G8400 PT W/DXA NO RESULTS DOC: HCPCS | Performed by: INTERNAL MEDICINE

## 2021-01-04 PROCEDURE — G8427 DOCREV CUR MEDS BY ELIG CLIN: HCPCS | Performed by: INTERNAL MEDICINE

## 2021-01-04 PROCEDURE — 1123F ACP DISCUSS/DSCN MKR DOCD: CPT | Performed by: INTERNAL MEDICINE

## 2021-01-04 PROCEDURE — 1090F PRES/ABSN URINE INCON ASSESS: CPT | Performed by: INTERNAL MEDICINE

## 2021-01-04 RX ORDER — ASPIRIN 81 MG/1
1 TABLET, CHEWABLE ORAL DAILY
COMMUNITY

## 2021-01-04 NOTE — PROGRESS NOTES
[facility]   Cardiac Evaluation      Patient: Sujata Astorga  YOB: 1950         Chief Complaint   Patient presents with    Hypertension     No complaints     Coronary Artery Disease        Referring provider: No primary care provider on file. History of Present Illness:   Ms. Anai Becker is a 69 y.o. female here for follow up. Her history includes coronary disease with stent placed to proximal LAD 9/10, cardiomyopathy, hypertension, and hyperlipidemia. Emir Cipro performed 12/10 revealed at EF of 37%. Echo 10/13 revealed EF 25-30%. She did have GXT 12/14 and EF was 44 % , ECHO 4/18 showed EF 40%, she has not had any exacerbations of CHF. Ms Anai Becker continues to smoke 1 PPD. At her last ov with Umang Isaacs NP,  her symptoms have resolved since adding the Pepcid LOV. She also has not had as much personal stress in her life since her granddaughter moved out with her baby and boyfriend. Today, she is here for a follow up. She states she is feeling the same as she normally does. She denies having any chest pain or palpitations. She has some shortness of breath when walking quickly, but does not have it when walking up the stairs. She states that she does not exercise. She is a current every day smoker. She has some swelling in her ankles which she said it is because of her soda intake. She was educated on her fluid/ sodium intake. With regard to medication therapy he/she has been compliant with prescribed regimen and has tolerated therapy to date. Past Medical History:   has a past medical history of Arthritis, CAD (coronary artery disease), Cardiomyopathy ([address] GERD (gastroesophageal reflux disease), Hypertension, IBS (irritable bowel syndrome), and Incontinence of urine. Surgical History:   has a past surgical history that includes Hysterectomy; Bladder surgery (2010); Colonoscopy; other surgical history (2010);  Coronary angioplasty with stent; bladder suspension; other surgical history; Colonoscopy (N/A, 11/17/2020); Colonoscopy (11/17/2020); and Hemorrhoid surgery (N/A, 11/19/2020). Current Outpatient Medications   Medication Sig Dispense Refill    aspirin 81 MG chewable tablet Take 1 tablet by mouth daily      pravastatin (PRAVACHOL) 80 MG tablet Take 1 tablet by mouth daily 90 tablet 1    lisinopril (PRINIVIL;ZESTRIL) 40 MG tablet TAKE 1 TABLET BY MOUTH EVERY DAY 90 tablet 1    famotidine (PEPCID) 20 MG tablet Take 1 tablet by mouth 2 times daily 60 tablet 3    clopidogrel (PLAVIX) 75 MG tablet TAKE 1 TABLET DAILY 90 tablet 3    carvedilol (COREG) 25 MG tablet TAKE 1 TABLET TWICE A DAY WITH MEALS 180 tablet 3    amLODIPine (NORVASC) 10 MG tablet Take 5 mg by mouth       No current facility-administered medications for this visit. Allergies:  Patient has no known allergies. Social History:  Social History     Socioeconomic History    Marital status:       Spouse name: Not on file    Number of children: Not on file    Years of education: Not on file    Highest education level: Not on file   Occupational History    Not on file   Social Needs    Financial resource strain: Not on file    Food insecurity     Worry: Not on file     Inability: Not on file    Transportation needs     Medical: Not on file     Non-medical: Not on file   Tobacco Use    Smoking status: Current Every Day Smoker     Packs/day: 0.50     Years: 50.00     Pack years: 25.00     Types: Cigarettes    Smokeless tobacco: Never Used   Substance and Sexual Activity    Alcohol use: No    Drug use: No    Sexual activity: Not on file   Lifestyle    Physical activity     Days per week: Not on file     Minutes per session: Not on file    Stress: Not on file   Relationships    Social connections     Talks on phone: Not on file     Gets together: Not on file     Attends Zoroastrian service: Not on file     Active member of club or organization: Not on file     Attends meetings of clubs or organizations: Not on file     Relationship status: Not on file    Intimate partner violence     Fear of current or ex partner: Not on file     Emotionally abused: Not on file     Physically abused: Not on file     Forced sexual activity: Not on file   Other Topics Concern    Not on file   Social History Narrative    Not on file       Family History:   Family History   Problem Relation Age of Onset    High Blood Pressure Mother     Stroke Mother     Heart Disease Father      Family history has been reviewed and not pertinent except as noted above. Review of Systems:   · Constitutional: there has been no unanticipated weight loss. No change in energy or activity level   · Eyes: No visual changes   · ENT: No Headaches, hearing loss or vertigo. No mouth sores or sore throat. · Cardiovascular: Reviewed in HPI  · Respiratory: No cough or wheezing, no sputum production. · Gastrointestinal: No abdominal pain, appetite loss, blood in stools. No change in bowel or bladder habits. · Genitourinary: No nocturia, dysuria, trouble voiding  · Musculoskeletal:  No gait disturbance, weakness or joint complaints. · Integumentary: No rash or pruritis. · Neurological: No headache, change in muscle strength, numbness or tingling. No change in gait, balance, coordination, mood, affect, memory, mentation, behavior. · Psychiatric: No anxiety or depression  · Endocrine: No malaise or fever  · Hematologic/Lymphatic: No abnormal bruising or bleeding, blood clots or swollen lymph nodes. · Allergic/Immunologic: No nasal congestion or hives. Physical Examination:    Vitals:    01/04/21 1456   BP: 138/82   Site: Left Upper Arm   Position: Sitting   Cuff Size: Medium Adult   Pulse: 78   Resp: 18   SpO2: 96%   Weight: 144 lb 9.6 oz (65.6 kg)   Height: 5' 1\" (1.549 m)     Body mass index is 27.32 kg/m².      Wt Readings from Last 3 Encounters:   01/04/21 144 lb 9.6 oz (65.6 kg)   11/19/20 141 lb (64 kg)   11/17/20 140 lb (63.5 kg)      BP Readings from Last 3 Encounters:   01/04/21 138/82   11/19/20 (!) 167/76   11/19/20 118/61        Physical Examination:    · CONSTITUTIONAL: Well developed, well nourished  · EYES: PERRLA. No xanthelasma, sclera non icteric  · EARS,NOSE,MOUTH,THROAT:  Mucous membranes moist, normal hearing  · NECK: Supple, JVP normal, thyroid not enlarged. Carotids 2+ without bruits  · RESPIRATORY: Normal effort, no rales or rhonchi  · CARDIOVASCULAR: Normal PMI, regular rate and rhythm, no murmurs, rub or gallop. No edema. Radial pulses present and equal  · CHEST: No scar or masses  · ABDOMEN: Normal bowel sounds. No masses or tenderness. No bruit  · MUSCULOSKELETAL: No clubbing or cyanosis. Moves all extremities well. Normal gait  · SKIN:  Warm and dry. No rashes  · NEUROLOGIC: Cranial nerves intact. Alert and oriented  · PSYCHIATRIC: Calm affect. Appears to have normal judgement and insight    All testing and labs listed below were personally reviewed by myself. Stress Jan '19  Summary  There is a moderate-large anterior, septal and apical fixed defect c/w scar. There is no significant ischemia. There is anterior, septal and apical hypokinesis with EF=40%  Intermediate risk. Similar to 2014 study     Echo~ April '18  Summary  Left ventricular function is reduced with ejection fraction estimated at 40%. There is severe hypokinesis of the apical anterioseptal wall. Diastolic filling parameters suggest grade I diastolic dysfunction with normal filling pressures. E/e''=15.5  The mitral valve is normal in structure . Mild mitral regurgitation is present. Mild aortic regurgitation is present. Assessment/Plan  1. Coronary artery disease involving native heart without angina pectoris, unspecified vessel or lesion type    2. Essential hypertension    3. Hyperlipidemia, unspecified hyperlipidemia type    4. Cardiomyopathy, unspecified type ([address]    5.  Tobacco abuse          CAD (coronary artery disease)  Angina: no  Intervention~ PCI to LAD 2010  Current meds~ plavix  Plan~normal stress 2019      HTN (hypertension)  Controlled  Meds~ amlodipine  Plan~ stable    Hyperlipemia  LDL~ not on file  Date of last lipid panel  Meds~ pravastatin  Plan~ get lipid panel    Cardiomyopathy  Symptomatic-> no  NYHA score-> 1  Systolic   EF~ 44% (improved from 25-30%)  Ischemic   Current HF meds~ coreg / lisinopril  Plan~ Continue current plan    Tobacco abuse  Current smoker~ yes  How much~ 1 PPD  Trying to quit- No  Counseled~ yes      Orders Placed This Encounter   Procedures   Dakota Farmer MD      Thank you for allowing to me to participate in the care of [facility]. [address], am scribing for and in the presence of Moe Sapp MD.   Signed, Preethi Martin 01/04/21 3:36 PM     I, Dr. Moe Sapp, personally performed the services described in this documentation, as scribed by the above signed scribe in my presence. It is both accurate and complete to my knowledge. I agree with the details independently gathered by the clinical support staff, while the remaining scribed note accurately describes my personal service to the patient.

## 2021-01-04 NOTE — PROGRESS NOTES
[address]   Cardiac Evaluation      Patient: Doris Hudson  YOB: 1950         Chief Complaint   Patient presents with    Hypertension     No complaints     Coronary Artery Disease        Referring provider: No primary care provider on file. History of Present Illness:   Ms. Arleth Almodovar is a 69 y.o. female here for follow up. Her history includes coronary disease with stent placed to proximal LAD 9/10, cardiomyopathy, hypertension, and hyperlipidemia. Lundberg Hipolito performed 12/10 revealed at EF of 37%. Echo 10/13 revealed EF 25-30%. She did have GXT 12/14 and EF was 44 % , ECHO 4/18 showed EF 40%, she has not had any exacerbations of CHF. Ms Arleth Almodovar continues to smoke 1 PPD. At her last ov with Chana Travis NP,  her symptoms have resolved since adding the Pepcid LOV. She also has not had as much personal stress in her life since her granddaughter moved out with her baby and boyfriend. Today,     Chest Pain: Location ***, Severity ***, radiation ***, movement or rest ***, duration ***, frequency ***, progression ***  Dyspnea: Severity ***, movement or rest ***, duration ***, frequency ***, progression ***, edema***, weight gain ***    Family history of heart disease: ***  Smoking History: ***  Exercise, activity level: ***  Heart Healthy Diet: ***    With regard to medication therapy he/she has been compliant with prescribed regimen and has tolerated therapy to date. Past Medical History:   has a past medical history of Arthritis, CAD (coronary artery disease), Cardiomyopathy ([address] GERD (gastroesophageal reflux disease), Hypertension, IBS (irritable bowel syndrome), and Incontinence of urine.     Surgical History: has a past surgical history that includes Hysterectomy; Bladder surgery (2010); Colonoscopy; other surgical history (2010); Coronary angioplasty with stent; bladder suspension; other surgical history; Colonoscopy (N/A, 11/17/2020); Colonoscopy (11/17/2020); and Hemorrhoid surgery (N/A, 11/19/2020). Current Outpatient Medications   Medication Sig Dispense Refill    aspirin 81 MG chewable tablet Take 1 tablet by mouth daily      pravastatin (PRAVACHOL) 80 MG tablet Take 1 tablet by mouth daily 90 tablet 1    lisinopril (PRINIVIL;ZESTRIL) 40 MG tablet TAKE 1 TABLET BY MOUTH EVERY DAY 90 tablet 1    famotidine (PEPCID) 20 MG tablet Take 1 tablet by mouth 2 times daily 60 tablet 3    clopidogrel (PLAVIX) 75 MG tablet TAKE 1 TABLET DAILY 90 tablet 3    carvedilol (COREG) 25 MG tablet TAKE 1 TABLET TWICE A DAY WITH MEALS 180 tablet 3    amLODIPine (NORVASC) 10 MG tablet Take 5 mg by mouth       No current facility-administered medications for this visit. Allergies:  Patient has no known allergies. Social History:  Social History     Socioeconomic History    Marital status:       Spouse name: Not on file    Number of children: Not on file    Years of education: Not on file    Highest education level: Not on file   Occupational History    Not on file   Social Needs    Financial resource strain: Not on file    Food insecurity     Worry: Not on file     Inability: Not on file    Transportation needs     Medical: Not on file     Non-medical: Not on file   Tobacco Use    Smoking status: Current Every Day Smoker     Packs/day: 0.50     Years: 50.00     Pack years: 25.00     Types: Cigarettes    Smokeless tobacco: Never Used   Substance and Sexual Activity    Alcohol use: No    Drug use: No    Sexual activity: Not on file   Lifestyle    Physical activity     Days per week: Not on file     Minutes per session: Not on file    Stress: Not on file   Relationships    Social connections Talks on phone: Not on file     Gets together: Not on file     Attends Latter day service: Not on file     Active member of club or organization: Not on file     Attends meetings of clubs or organizations: Not on file     Relationship status: Not on file    Intimate partner violence     Fear of current or ex partner: Not on file     Emotionally abused: Not on file     Physically abused: Not on file     Forced sexual activity: Not on file   Other Topics Concern    Not on file   Social History Narrative    Not on file       Family History:   Family History   Problem Relation Age of Onset    High Blood Pressure Mother     Stroke Mother     Heart Disease Father      Family history has been reviewed and not pertinent except as noted above. Review of Systems:   · Constitutional: there has been no unanticipated weight loss. No change in energy or activity level   · Eyes: No visual changes   · ENT: No Headaches, hearing loss or vertigo. No mouth sores or sore throat. · Cardiovascular: Reviewed in HPI  · Respiratory: No cough or wheezing, no sputum production. · Gastrointestinal: No abdominal pain, appetite loss, blood in stools. No change in bowel or bladder habits. · Genitourinary: No nocturia, dysuria, trouble voiding  · Musculoskeletal:  No gait disturbance, weakness or joint complaints. · Integumentary: No rash or pruritis. · Neurological: No headache, change in muscle strength, numbness or tingling. No change in gait, balance, coordination, mood, affect, memory, mentation, behavior. · Psychiatric: No anxiety or depression  · Endocrine: No malaise or fever  · Hematologic/Lymphatic: No abnormal bruising or bleeding, blood clots or swollen lymph nodes. · Allergic/Immunologic: No nasal congestion or hives.     Physical Examination:    Vitals:    01/04/21 1456   BP: 138/82   Site: Left Upper Arm   Position: Sitting   Cuff Size: Medium Adult   Pulse: 78   Resp: 18   SpO2: 96% Weight: 144 lb 9.6 oz (65.6 kg)   Height: 5' 1\" (1.549 m)     Body mass index is 27.32 kg/m². Wt Readings from Last 3 Encounters:   01/04/21 144 lb 9.6 oz (65.6 kg)   11/19/20 141 lb (64 kg)   11/17/20 140 lb (63.5 kg)      BP Readings from Last 3 Encounters:   01/04/21 138/82   11/19/20 (!) 167/76   11/19/20 118/61        Physical Examination:    · CONSTITUTIONAL: Well developed, well nourished  · EYES: PERRLA. No xanthelasma, sclera non icteric  · EARS,NOSE,MOUTH,THROAT:  Mucous membranes moist, normal hearing  · NECK: Supple, JVP normal, thyroid not enlarged. Carotids 2+ without bruits  · RESPIRATORY: Normal effort, no rales or rhonchi  · CARDIOVASCULAR: Normal PMI, regular rate and rhythm, no murmurs, rub or gallop. No edema. Radial pulses present and equal  · CHEST: No scar or masses  · ABDOMEN: Normal bowel sounds. No masses or tenderness. No bruit  · MUSCULOSKELETAL: No clubbing or cyanosis. Moves all extremities well. Normal gait  · SKIN:  Warm and dry. No rashes  · NEUROLOGIC: Cranial nerves intact. Alert and oriented  · PSYCHIATRIC: Calm affect. Appears to have normal judgement and insight    All testing and labs listed below were personally reviewed by myself. Stress Jan '19  Summary  There is a moderate-large anterior, septal and apical fixed defect c/w scar. There is no significant ischemia. There is anterior, septal and apical hypokinesis with EF=40%  Intermediate risk. Similar to 2014 study     Echo~ April '18  Summary  Left ventricular function is reduced with ejection fraction estimated at 40%. There is severe hypokinesis of the apical anterioseptal wall. Diastolic filling parameters suggest grade I diastolic dysfunction with normal filling pressures. E/e''=15.5  The mitral valve is normal in structure . Mild mitral regurgitation is present. Mild aortic regurgitation is present.         Assessment/Plan 1. Coronary artery disease involving native heart without angina pectoris, unspecified vessel or lesion type    2. Essential hypertension    3. Hyperlipidemia, unspecified hyperlipidemia type    4. Cardiomyopathy, unspecified type ([address]    5. Tobacco abuse          CAD (coronary artery disease)  Angina: no  Intervention~ PCI to LAD 2010  Current meds~ plavix  Plan~normal stress 2019      HTN (hypertension)  Controlled  Meds~ amlodipine  Plan~ stable    Hyperlipemia  LDL~ not on file  Date of last lipid panel  Meds~ pravastatin  Plan~ get lipid panel    Cardiomyopathy  Symptomatic-> no  NYHA score-> 1-4  Systolic   EF~ 58% (improved from 25-30%)  Ischemic   Current HF meds~ coreg / lisinopril  Plan~ Continue current plan    Tobacco abuse  Current smoker~ yes  How much~ 1 PPD  Trying to quit- No  Counseled~ yes      Orders Placed This Encounter   Procedures   Ha Wylie MD      Thank you for allowing to me to participate in the care of Sandrita Aguiar. Scribe's Attestation: This note was scribed in the presence of Dr. Boubacar Joel MD by Masoud Sigala RN.

## 2021-04-07 ENCOUNTER — APPOINTMENT (OUTPATIENT)
Dept: CT IMAGING | Age: 71
End: 2021-04-07
Payer: MEDICARE

## 2021-04-07 ENCOUNTER — APPOINTMENT (OUTPATIENT)
Dept: GENERAL RADIOLOGY | Age: 71
End: 2021-04-07
Payer: MEDICARE

## 2021-04-07 ENCOUNTER — HOSPITAL ENCOUNTER (EMERGENCY)
Age: 71
Discharge: HOME | End: 2021-04-07
Attending: EMERGENCY MEDICINE
Payer: MEDICARE

## 2021-04-07 VITALS
BODY MASS INDEX: 26.43 KG/M2 | DIASTOLIC BLOOD PRESSURE: 79 MMHG | SYSTOLIC BLOOD PRESSURE: 162 MMHG | RESPIRATION RATE: 18 BRPM | OXYGEN SATURATION: 95 % | HEART RATE: 76 BPM | TEMPERATURE: 98.3 F | WEIGHT: 140 LBS | HEIGHT: 61 IN

## 2021-04-07 DIAGNOSIS — G47.00 INSOMNIA, UNSPECIFIED TYPE: Primary | ICD-10-CM

## 2021-04-07 DIAGNOSIS — F32.A ANXIETY AND DEPRESSION: ICD-10-CM

## 2021-04-07 DIAGNOSIS — F41.9 ANXIETY AND DEPRESSION: ICD-10-CM

## 2021-04-07 DIAGNOSIS — R51.9 ACUTE NONINTRACTABLE HEADACHE, UNSPECIFIED HEADACHE TYPE: ICD-10-CM

## 2021-04-07 LAB
A/G RATIO: 1.6 (ref 1.1–2.2)
ALBUMIN SERPL-MCNC: 4.5 G/DL (ref 3.4–5)
ALP BLD-CCNC: 82 U/L (ref 40–129)
ALT SERPL-CCNC: 8 U/L (ref 10–40)
ANION GAP SERPL CALCULATED.3IONS-SCNC: 13 MMOL/L (ref 3–16)
AST SERPL-CCNC: 20 U/L (ref 15–37)
BASOPHILS ABSOLUTE: 0.1 K/UL (ref 0–0.2)
BASOPHILS RELATIVE PERCENT: 0.8 %
BILIRUB SERPL-MCNC: 0.4 MG/DL (ref 0–1)
BILIRUBIN URINE: NEGATIVE
BLOOD, URINE: ABNORMAL
BUN BLDV-MCNC: 10 MG/DL (ref 7–20)
CALCIUM SERPL-MCNC: 9.2 MG/DL (ref 8.3–10.6)
CHLORIDE BLD-SCNC: 105 MMOL/L (ref 99–110)
CLARITY: ABNORMAL
CO2: 21 MMOL/L (ref 21–32)
COLOR: YELLOW
CREAT SERPL-MCNC: <0.5 MG/DL (ref 0.6–1.2)
EKG ATRIAL RATE: 68 BPM
EKG DIAGNOSIS: NORMAL
EKG P AXIS: 57 DEGREES
EKG P-R INTERVAL: 172 MS
EKG Q-T INTERVAL: 414 MS
EKG QRS DURATION: 140 MS
EKG QTC CALCULATION (BAZETT): 440 MS
EKG R AXIS: -31 DEGREES
EKG T AXIS: 56 DEGREES
EKG VENTRICULAR RATE: 68 BPM
EOSINOPHILS ABSOLUTE: 0.2 K/UL (ref 0–0.6)
EOSINOPHILS RELATIVE PERCENT: 3.6 %
EPITHELIAL CELLS, UA: 1 /HPF (ref 0–5)
GFR AFRICAN AMERICAN: >60
GFR NON-AFRICAN AMERICAN: >60
GLOBULIN: 2.8 G/DL
GLUCOSE BLD-MCNC: 98 MG/DL (ref 70–99)
GLUCOSE URINE: NEGATIVE MG/DL
HCT VFR BLD CALC: 41.9 % (ref 36–48)
HEMOGLOBIN: 13.8 G/DL (ref 12–16)
HYALINE CASTS: 0 /LPF (ref 0–8)
KETONES, URINE: NEGATIVE MG/DL
LEUKOCYTE ESTERASE, URINE: NEGATIVE
LYMPHOCYTES ABSOLUTE: 1.2 K/UL (ref 1–5.1)
LYMPHOCYTES RELATIVE PERCENT: 17.4 %
MCH RBC QN AUTO: 29.6 PG (ref 26–34)
MCHC RBC AUTO-ENTMCNC: 32.9 G/DL (ref 31–36)
MCV RBC AUTO: 89.9 FL (ref 80–100)
MICROSCOPIC EXAMINATION: YES
MONOCYTES ABSOLUTE: 0.7 K/UL (ref 0–1.3)
MONOCYTES RELATIVE PERCENT: 10.1 %
NEUTROPHILS ABSOLUTE: 4.6 K/UL (ref 1.7–7.7)
NEUTROPHILS RELATIVE PERCENT: 68.1 %
NITRITE, URINE: NEGATIVE
PDW BLD-RTO: 13.7 % (ref 12.4–15.4)
PH UA: 7.5 (ref 5–8)
PLATELET # BLD: 181 K/UL (ref 135–450)
PMV BLD AUTO: 7.9 FL (ref 5–10.5)
POTASSIUM SERPL-SCNC: 4.4 MMOL/L (ref 3.5–5.1)
PROTEIN UA: NEGATIVE MG/DL
RBC # BLD: 4.66 M/UL (ref 4–5.2)
RBC UA: 5 /HPF (ref 0–4)
SODIUM BLD-SCNC: 139 MMOL/L (ref 136–145)
SPECIFIC GRAVITY UA: 1 (ref 1–1.03)
TOTAL PROTEIN: 7.3 G/DL (ref 6.4–8.2)
TROPONIN: <0.01 NG/ML
URINE REFLEX TO CULTURE: ABNORMAL
URINE TYPE: ABNORMAL
UROBILINOGEN, URINE: 0.2 E.U./DL
WBC # BLD: 6.8 K/UL (ref 4–11)
WBC UA: 1 /HPF (ref 0–5)

## 2021-04-07 PROCEDURE — 84484 ASSAY OF TROPONIN QUANT: CPT

## 2021-04-07 PROCEDURE — 93005 ELECTROCARDIOGRAM TRACING: CPT | Performed by: PHYSICIAN ASSISTANT

## 2021-04-07 PROCEDURE — 80053 COMPREHEN METABOLIC PANEL: CPT

## 2021-04-07 PROCEDURE — 85025 COMPLETE CBC W/AUTO DIFF WBC: CPT

## 2021-04-07 PROCEDURE — 71045 X-RAY EXAM CHEST 1 VIEW: CPT

## 2021-04-07 PROCEDURE — 70450 CT HEAD/BRAIN W/O DYE: CPT

## 2021-04-07 PROCEDURE — 81001 URINALYSIS AUTO W/SCOPE: CPT

## 2021-04-07 PROCEDURE — 93010 ELECTROCARDIOGRAM REPORT: CPT | Performed by: INTERNAL MEDICINE

## 2021-04-07 PROCEDURE — 99283 EMERGENCY DEPT VISIT LOW MDM: CPT

## 2021-04-07 PROCEDURE — 6370000000 HC RX 637 (ALT 250 FOR IP): Performed by: PHYSICIAN ASSISTANT

## 2021-04-07 RX ORDER — HYDROXYZINE PAMOATE 25 MG/1
25 CAPSULE ORAL 3 TIMES DAILY PRN
Qty: 30 CAPSULE | Refills: 0 | Status: SHIPPED | OUTPATIENT
Start: 2021-04-07 | End: 2021-04-21

## 2021-04-07 RX ORDER — HYDROXYZINE PAMOATE 25 MG/1
50 CAPSULE ORAL ONCE
Status: COMPLETED | OUTPATIENT
Start: 2021-04-07 | End: 2021-04-07

## 2021-04-07 RX ORDER — ACETAMINOPHEN 325 MG/1
325 TABLET ORAL ONCE
Status: COMPLETED | OUTPATIENT
Start: 2021-04-07 | End: 2021-04-07

## 2021-04-07 RX ADMIN — HYDROXYZINE PAMOATE 50 MG: 25 CAPSULE ORAL at 10:46

## 2021-04-07 RX ADMIN — ACETAMINOPHEN 325 MG: 325 TABLET ORAL at 10:46

## 2021-04-07 ASSESSMENT — ENCOUNTER SYMPTOMS
BACK PAIN: 1
VOMITING: 0
COLOR CHANGE: 0
CONSTIPATION: 0
ABDOMINAL DISTENTION: 0
NAUSEA: 0
WHEEZING: 0
DIARRHEA: 0
COUGH: 0
STRIDOR: 0
SHORTNESS OF BREATH: 0
ABDOMINAL PAIN: 0

## 2021-04-07 ASSESSMENT — PAIN SCALES - GENERAL
PAINLEVEL_OUTOF10: 7
PAINLEVEL_OUTOF10: 8

## 2021-04-07 NOTE — ED PROVIDER NOTES
[facility]        Pt Name: Carlota Garcia  MRN: [id number]  [place] 1950  Date of evaluation: 4/7/2021  Provider: Alessia Neil PA-C  PCP: Chanelle Dykes     I have seen and evaluated this patient with my supervising physician Dr Rosales Daugherty       Chief Complaint   Patient presents with    Insomnia     Intermittent inability to sleep & headache for several days. Feels \"like body is vibrating. \"       Headache       HISTORY OF PRESENT ILLNESS   (Location, Timing/Onset, Context/Setting, Quality, Duration, Modifying Factors, Severity, Associated Signs and Symptoms)  Note limiting factors. Carlota Garcia is a 66 y.o. female with history of coronary disease, cardiomyopathy, IBS, hypertension, anxiety, depression who presents to the emergency department complaining of intermittent generalized headache with occasional radiation into her neck and upper back for 2 to 3 days. Denies any preceding injury. She also feels like her body is \"vibrating or thumping \"at times. At this time, she has a very mild generalized headache. She was abruptly taken off of Xanax and the pain pill that she was on for 20 years back in 2018 because her PCP retired. The new PCP that she sees did not place her on any anxiolytic medication or pain medication. She denies illicit drug use or alcohol use. She states that she has been feeling foggy at times but admits that she has not had much sleep over the past 2 to 3 days. She does appear anxious. Denies suicidal or homicidal ideation or hallucinations. She does not see a psychiatrist or counselor. Nursing Notes were all reviewed and agreed with or any disagreements were addressed in the HPI. REVIEW OF SYSTEMS    (2-9 systems for level 4, 10 or more for level 5)     Review of Systems   Constitutional: Positive for fatigue. Negative for chills and fever. HENT: Negative.     Eyes: Negative for visual disturbance. Respiratory: Negative for cough, shortness of breath, wheezing and stridor. Cardiovascular: Negative for chest pain, palpitations and leg swelling. Gastrointestinal: Negative for abdominal distention, abdominal pain, constipation, diarrhea, nausea and vomiting. Endocrine: Negative. Genitourinary: Negative. Musculoskeletal: Positive for back pain and neck pain. Negative for neck stiffness. Skin: Negative for color change, pallor, rash and wound. Neurological: Positive for headaches. Negative for dizziness, tremors, seizures, syncope, facial asymmetry, speech difficulty, weakness, light-headedness and numbness. Psychiatric/Behavioral: Positive for decreased concentration and sleep disturbance. Negative for confusion, hallucinations, self-injury and suicidal ideas. The patient is nervous/anxious. All other systems reviewed and are negative. Positives and Pertinent negatives as per HPI. Except as noted above in the ROS, all other systems were reviewed and negative.        PAST MEDICAL HISTORY     Past Medical History:   Diagnosis Date    Arthritis     CAD (coronary artery disease)     Cardiomyopathy ([facility] [address]     GERD (gastroesophageal reflux disease)     Hypertension     IBS (irritable bowel syndrome)     Incontinence of urine          SURGICAL HISTORY     Past Surgical History:   Procedure Laterality Date    BLADDER SURGERY  2010    sling    BLADDER SUSPENSION      COLONOSCOPY      COLONOSCOPY N/A 11/17/2020    COLONOSCOPY POLYPECTOMY SNARE/COLD BIOPSY performed by Asuncion Wong MD at [address] [facility] COLONOSCOPY  11/17/2020    COLONOSCOPY WITH BIOPSY performed by Asuncion Wong MD at [address] N/A 11/19/2020    HEMORRHOIDECTOMY performed by Asuncion Wong MD at [address]  2010    coronary stent to [facility] OTHER SURGICAL HISTORY      TVT, A/P REPAIR          CURRENTMEDICATIONS       Discharge Medication List as of 4/7/2021 12:23 PM      CONTINUE these medications which have NOT CHANGED    Details   aspirin 81 MG chewable tablet Take 1 tablet by mouth dailyHistorical Med      pravastatin (PRAVACHOL) 80 MG tablet Take 1 tablet by mouth daily, Disp-90 tablet, R-1Normal      lisinopril (PRINIVIL;ZESTRIL) 40 MG tablet TAKE 1 TABLET BY MOUTH EVERY DAY, Disp-90 tablet, R-1Normal      famotidine (PEPCID) 20 MG tablet Take 1 tablet by mouth 2 times daily, Disp-60 tablet, R-3Normal      clopidogrel (PLAVIX) 75 MG tablet TAKE 1 TABLET DAILY, Disp-90 tablet, R-3Normal      carvedilol (COREG) 25 MG tablet TAKE 1 TABLET TWICE A DAY WITH MEALS, Disp-180 tablet, R-3Normal      amLODIPine (NORVASC) 10 MG tablet Take 5 mg by mouthHistorical Med               ALLERGIES     Patient has no known allergies. FAMILYHISTORY       Family History   Problem Relation Age of Onset    High Blood Pressure Mother     Stroke Mother     Heart Disease Father           SOCIAL HISTORY       Social History     Tobacco Use    Smoking status: Current Every Day Smoker     Packs/day: 1.00     Years: 50.00     Pack years: 50.00     Types: Cigarettes    Smokeless tobacco: Never Used   Substance Use Topics    Alcohol use: No    Drug use: No       SCREENINGS   NIH Stroke Scale  NIH Stroke Scale Assessed: Yes  Interval: Baseline  Level of Consciousness (1a. ): Alert  LOC Questions (1b. ):  Answers both correctly  LOC Commands (1c. ): Performs both tasks correctly  Best Gaze (2. ): Normal  Visual (3. ): No visual loss  Facial Palsy (4. ): Normal symmetrical movement  Motor Arm, Left (5a. ): No drift  Motor Arm, Right (5b. ): No drift  Motor Leg, Left (6a. ): No drift  Motor Leg, Right (6b. ): No drift  Limb Ataxia (7. ): Absent  Sensory (8. ): Normal  Best Language (9. ): No aphasia  Dysarthria (10. ): Normal  Extinction and Inattention (11): No General: No focal deficit present. Mental Status: She is alert and oriented to person, place, and time. GCS: GCS eye subscore is 4. GCS verbal subscore is 5. GCS motor subscore is 6. Cranial Nerves: No cranial nerve deficit. Sensory: Sensation is intact. No sensory deficit. Motor: Motor function is intact. Coordination: Coordination is intact. Gait: Gait is intact. Comments: No pronator drift, facial droop or slurred speech. Normal finger to nose coordination. Normal rapid alternating hand movement. Normal heel to shin coordination  Enrique Hallpike negative without nystagmus. 5 out of 5 strength in all 4 extremities without focal weakness, paresthesia or radiculopathy. Psychiatric:         Attention and Perception: Attention and perception normal.         Mood and Affect: Mood is anxious and depressed. Speech: Speech normal.         Behavior: Behavior normal. Behavior is cooperative. Thought Content:  Thought content normal.         Cognition and Memory: Cognition and memory normal.         Judgment: Judgment normal.         DIAGNOSTIC RESULTS   LABS:    Labs Reviewed   URINE RT REFLEX TO CULTURE - Abnormal; Notable for the following components:       Result Value    Clarity, UA CLOUDY (*)     Blood, Urine SMALL (*)     All other components within normal limits    Narrative:     Performed at:  [facility] [address],  [place], [address]   Phone [phone]   COMPREHENSIVE METABOLIC PANEL - Abnormal; Notable for the following components:    CREATININE <0.5 (*)     ALT 8 (*)     All other components within normal limits    Narrative:     Performed at:  [facility] [address],  [place], [address]   Phone [phone]   MICROSCOPIC URINALYSIS - Abnormal; Notable for the following components:    RBC, UA 5 (*)     All other components within normal limits    Narrative: Performed at:  [facility]  [address] [facility]  [place], [address]   Phone [phone]   CBC WITH AUTO DIFFERENTIAL    Narrative:     Performed at:  [facility]  [address] [facility]  [place], [address]   Phone [phone]   TROPONIN    Narrative:     Performed at:  [facility] [address] [facility] [address] [facility]  [place], [address] [facility]   Phone [phone]       All other labs were within normal range or not returned as of this dictation. EKG: All EKG's are interpreted by the Emergency Department Physician in the absence of a cardiologist.  Please see their note for interpretation of EKG. RADIOLOGY:   Non-plain film images such as CT, Ultrasound and MRI are read by the radiologist. Plain radiographic images are visualized and preliminarily interpreted by the ED Provider with the below findings:        Interpretation per the Radiologist below, if available at the time of this note:    CT HEAD WO CONTRAST   Final Result   No acute intracranial abnormality. Mild decreased attenuation in the periventricular white matter suggesting   mild chronic ischemic changes         XR CHEST PORTABLE   Final Result   No acute findings                   PROCEDURES   Unless otherwise noted below, none     Procedures    CRITICAL CARE TIME   N/A    CONSULTS:  None      EMERGENCY DEPARTMENT COURSE and DIFFERENTIAL DIAGNOSIS/MDM:   Vitals:    Vitals:    04/07/21 1020   BP: (!) 162/79   Pulse: 76   Resp: 18   Temp: 98.3 °F (36.8 °C)   TempSrc: Oral   SpO2: 95%   Weight: 140 lb (63.5 kg)   Height: 5' 1\" (1.549 m)       Patient was given the following medications:  Medications   acetaminophen (TYLENOL) tablet 325 mg (325 mg Oral Given 4/7/21 1046)   hydrOXYzine (VISTARIL) capsule 50 mg (50 mg Oral Given 4/7/21 1046)           This patient presents complaining of insomnia, fatigue, intermittent headache, \"body vibration sensation \". NIH scale 0.   She denies any profound headache at this time. CT head shows no acute intracranial abnormality. EKG morphology is unremarkable. Troponin negative. Other blood work is stable. No overt evidence of infection. Chest x-ray unremarkable. She was anxious on arrival and does feel somewhat comforted after receiving Vistaril. She does appear anxious and does not have a counselor or psychiatrist.  She was given information for mental health facilities within the [facility] area, where she resides. She denies suicidal or homicidal ideation or hallucinations. Denies illicit drug use or alcohol abuse. My suspicion is low for delirium tremens, seizure, DKA, hypoglycemia, overdose, cerebellar compromise, posterior stroke, sepsis,carotid dissection, sinus abscess, acute fracture, acute CVA, ICH, SAH, TIA, meningitis, encephalitis, pseudotumor cerebri, temporal arteritis, sentinel bleed from ruptured aneurysm, hypertensive urgency or emergency, subdural hematoma, epidural hematoma, pneumonia, pneumothorax, COVID-19,ACS, PE, myocarditis, pericarditis, endocarditis, acute pulmonary edema, pleural effusion, pericardial effusion, cardiac tamponade, cardiomyopathy, CHF exacerbation, thoracic aortic dissection, esophageal rupture, other life-threatening arrhythmia,  hemothorax, pulmonary contusion, subcutaneous emphysema, flail chest, pneumo mediastinum, rib fracture or other concerning pathology. We have addressed concerns and expectations. FINAL IMPRESSION      1. Insomnia, unspecified type    2. Acute nonintractable headache, unspecified headache type    3.  Anxiety and depression          DISPOSITION/PLAN   DISPOSITION Decision To Discharge 04/07/2021 12:19:50 PM      PATIENT REFERREDTO:  [address] [address]  [phone]    In 5 days  for re-evaluation    [facility] Emergency Department  [address] [facility]  [address] [facility] [address] [phone]    As needed, If symptoms

## 2021-04-07 NOTE — ED PROVIDER NOTES
I independently performed a history and physical on Chula Ahmadi. All diagnostic, treatment, and disposition decisions were made by myself in conjunction with the advanced practice provider. Briefly, this is a 66 y.o. female here for insomnia and restlessness. The patient states she could not go to sleep last night. She was taken off Xanax over 2 years ago, but is not been on any anxiety or depression medication since then. She admits to having increased anxiety and stress secondary to the pandemic and is not sure if it is related. She denies chest pain shortness of breath and other symptoms. She has had no recent medication, health, or life changes. Patient also reports a headache that is diffuse and mild. It is not the worst headache of her life. She has had no numbness, tingling, or focal weakness. She has not had unsteady gait, difficulty coordinating, or visual changes. On exam, the patient appears well-hydrated, well-nourished, and in no acute distress. Mucous membranes are moist. Speech is clear. Breathing is unlabored. Skin is dry. Mental status is normal. The patient moves all extremities. Face is symmetric without droop. Heart is RRR. Lungs are CTAB. EKG  The Ekg interpreted by me in the absence of a cardiologist shows. normal sinus rhythm with a rate of 68  Axis is   Left axis deviation  QTc is  normal  RBBB    No specific ST-T wave changes appreciated. No evidence of acute ischemia. No significant change from prior EKG dated 5/22/2020    Screenings  NIH Stroke Scale  NIH Stroke Scale Assessed: Yes  Interval: Baseline  Level of Consciousness (1a. ): Alert  LOC Questions (1b. ):  Answers both correctly  LOC Commands (1c. ): Performs both tasks correctly  Best Gaze (2. ): Normal  Visual (3. ): No visual loss  Facial Palsy (4. ): Normal symmetrical movement  Motor Arm, Left (5a. ): No drift  Motor Arm, Right (5b. ): No drift  Motor Leg, Left (6a. ): No drift  Motor Leg, Right (6b. ): No drift  Limb Ataxia (7. ): Absent  Sensory (8. ): Normal  Best Language (9. ): No aphasia  Dysarthria (10. ): Normal  Extinction and Inattention (11): No abnormality  Total: 0    MDM  Patient was treated with Vistaril in the emergency department as he suspected anxiety as a possible cause. Patient reports feeling better with this. Headache also resolved. FINAL IMPRESSION  1. Insomnia, unspecified type    2. Acute nonintractable headache, unspecified headache type    3. Anxiety and depression        Blood pressure (!) 162/79, pulse 76, temperature 98.3 °F (36.8 °C), temperature source Oral, resp. rate 18, height 5' 1\" (1.549 m), weight 140 lb (63.5 kg), SpO2 95 %.      For further details of [facility] ACUTE CARE emergency department encounter, please see documentation by advanced practice provider, BROOKE Coello.       Lucia Hess MD  04/10/21 7770

## 2021-06-02 ENCOUNTER — CLINICAL DOCUMENTATION (OUTPATIENT)
Dept: OTHER | Age: 71
End: 2021-06-02

## 2021-06-07 ENCOUNTER — TELEPHONE (OUTPATIENT)
Dept: CARDIOLOGY CLINIC | Age: 71
End: 2021-06-07

## 2021-06-07 RX ORDER — CLOPIDOGREL BISULFATE 75 MG/1
TABLET ORAL
Qty: 90 TABLET | Refills: 3 | Status: SHIPPED | OUTPATIENT
Start: 2021-06-07 | End: 2022-05-31

## 2021-06-07 RX ORDER — CARVEDILOL 25 MG/1
TABLET ORAL
Qty: 180 TABLET | Refills: 3 | Status: SHIPPED | OUTPATIENT
Start: 2021-06-07 | End: 2022-05-31

## 2021-06-07 RX ORDER — LISINOPRIL 40 MG/1
TABLET ORAL
Qty: 90 TABLET | Refills: 3 | Status: SHIPPED | OUTPATIENT
Start: 2021-06-07 | End: 2022-03-03

## 2021-06-07 NOTE — TELEPHONE ENCOUNTER
Medication Refill    Medication needing refilled:  Lisinopril 40mg , clopidogrel 75mg , carvedilol 25mg     Dosage of the medication:    How are you taking this medication (QD, BID, TID, QID, PRN):    30 or 90 day supply called in:    When will you run out of your medication:  1 week     Which Pharmacy are we sending the medication to?: [facility] [address]     Has appt w npka 8/17/21

## 2021-06-25 ENCOUNTER — OFFICE VISIT (OUTPATIENT)
Dept: CARDIOLOGY CLINIC | Age: 71
End: 2021-06-25
Payer: MEDICARE

## 2021-06-25 ENCOUNTER — TELEPHONE (OUTPATIENT)
Dept: CARDIOLOGY CLINIC | Age: 71
End: 2021-06-25

## 2021-06-25 VITALS
HEIGHT: 61 IN | BODY MASS INDEX: 27.56 KG/M2 | SYSTOLIC BLOOD PRESSURE: 138 MMHG | OXYGEN SATURATION: 96 % | DIASTOLIC BLOOD PRESSURE: 72 MMHG | WEIGHT: 146 LBS | HEART RATE: 76 BPM

## 2021-06-25 DIAGNOSIS — R07.9 CHEST PAIN, UNSPECIFIED TYPE: Primary | ICD-10-CM

## 2021-06-25 DIAGNOSIS — I25.10 CORONARY ARTERY DISEASE INVOLVING NATIVE HEART WITHOUT ANGINA PECTORIS, UNSPECIFIED VESSEL OR LESION TYPE: ICD-10-CM

## 2021-06-25 PROCEDURE — 1123F ACP DISCUSS/DSCN MKR DOCD: CPT | Performed by: INTERNAL MEDICINE

## 2021-06-25 PROCEDURE — 99213 OFFICE O/P EST LOW 20 MIN: CPT | Performed by: INTERNAL MEDICINE

## 2021-06-25 PROCEDURE — 93000 ELECTROCARDIOGRAM COMPLETE: CPT | Performed by: INTERNAL MEDICINE

## 2021-06-25 PROCEDURE — G8417 CALC BMI ABV UP PARAM F/U: HCPCS | Performed by: INTERNAL MEDICINE

## 2021-06-25 PROCEDURE — 4004F PT TOBACCO SCREEN RCVD TLK: CPT | Performed by: INTERNAL MEDICINE

## 2021-06-25 PROCEDURE — G8427 DOCREV CUR MEDS BY ELIG CLIN: HCPCS | Performed by: INTERNAL MEDICINE

## 2021-06-25 PROCEDURE — 4040F PNEUMOC VAC/ADMIN/RCVD: CPT | Performed by: INTERNAL MEDICINE

## 2021-06-25 PROCEDURE — 1090F PRES/ABSN URINE INCON ASSESS: CPT | Performed by: INTERNAL MEDICINE

## 2021-06-25 PROCEDURE — G8400 PT W/DXA NO RESULTS DOC: HCPCS | Performed by: INTERNAL MEDICINE

## 2021-06-25 PROCEDURE — 3017F COLORECTAL CA SCREEN DOC REV: CPT | Performed by: INTERNAL MEDICINE

## 2021-06-25 RX ORDER — NITROGLYCERIN 0.4 MG/1
0.4 TABLET SUBLINGUAL EVERY 5 MIN PRN
Qty: 25 TABLET | Refills: 1 | Status: SHIPPED | OUTPATIENT
Start: 2021-06-25

## 2021-06-25 NOTE — TELEPHONE ENCOUNTER
Pt will be seen in office today at 2:15 to see [facility], will determine if she needs to go to the ER at that time.

## 2021-06-25 NOTE — ASSESSMENT & PLAN NOTE
Atypical angina. Chest pressure with BOOKER. Concerning for new CAD.   Plan~ lexiscan, if normal keep f/u with NPKA

## 2021-06-25 NOTE — PROGRESS NOTES
3    clopidogrel (PLAVIX) 75 MG tablet TAKE 1 TABLET DAILY 90 tablet 3    carvedilol (COREG) 25 MG tablet TAKE 1 TABLET TWICE A DAY WITH MEALS 180 tablet 3    aspirin 81 MG chewable tablet Take 1 tablet by mouth daily      pravastatin (PRAVACHOL) 80 MG tablet Take 1 tablet by mouth daily 90 tablet 1    famotidine (PEPCID) 20 MG tablet Take 1 tablet by mouth 2 times daily 60 tablet 3    amLODIPine (NORVASC) 10 MG tablet Take 5 mg by mouth       No current facility-administered medications for this visit. Allergies:  Patient has no known allergies. Social History:  Social History     Socioeconomic History    Marital status:      Spouse name: Not on file    Number of children: Not on file    Years of education: Not on file    Highest education level: Not on file   Occupational History    Not on file   Tobacco Use    Smoking status: Current Every Day Smoker     Packs/day: 1.00     Years: 50.00     Pack years: 50.00     Types: Cigarettes    Smokeless tobacco: Never Used   Vaping Use    Vaping Use: Never used   Substance and Sexual Activity    Alcohol use: No    Drug use: No    Sexual activity: Not on file   Other Topics Concern    Not on file   Social History Narrative    Not on file     Social Determinants of Health     Financial Resource Strain:     Difficulty of Paying Living Expenses:    Food Insecurity:     Worried About Running Out of Food in the Last Year:     [address] in the Last Year:    Transportation Needs:     Lack of Transportation (Medical):      Lack of Transportation (Non-Medical):    Physical Activity:     Days of Exercise per Week:     Minutes of Exercise per Session:    Stress:     Feeling of Stress :    Social Connections:     Frequency of Communication with Friends and Family:     Frequency of Social Gatherings with Friends and Family:     Attends Cheondo[employer] Services:     Active Member of Clubs or Organizations:     Attends Club or Organization Meetings:     Marital Status:    Intimate Partner Violence:     Fear of Current or Ex-Partner:     Emotionally Abused:     Physically Abused:     Sexually Abused:        Family History:   Family History   Problem Relation Age of Onset    High Blood Pressure Mother     Stroke Mother     Heart Disease Father      Family history has been reviewed and not pertinent except as noted above. Review of Systems:   · Constitutional: there has been no unanticipated weight loss. No change in energy or activity level   · Eyes: No visual changes   · ENT: No Headaches, hearing loss or vertigo. No mouth sores or sore throat. · Cardiovascular: Reviewed in HPI  · Respiratory: No cough or wheezing, no sputum production. · Gastrointestinal: No abdominal pain, appetite loss, blood in stools. No change in bowel or bladder habits. · Genitourinary: No nocturia, dysuria, trouble voiding  · Musculoskeletal:  No gait disturbance, weakness or joint complaints. · Integumentary: No rash or pruritis. · Neurological: No headache, change in muscle strength, numbness or tingling. No change in gait, balance, coordination, mood, affect, memory, mentation, behavior. · Psychiatric: No anxiety or depression  · Endocrine: No malaise or fever  · Hematologic/Lymphatic: No abnormal bruising or bleeding, blood clots or swollen lymph nodes. · Allergic/Immunologic: No nasal congestion or hives. Physical Examination:    Vitals:    06/25/21 1403   BP: 138/72   Site: Left Upper Arm   Position: Sitting   Cuff Size: Medium Adult   Pulse: 76   SpO2: 96%   Weight: 146 lb (66.2 kg)   Height: 5' 1\" (1.549 m)     Body mass index is 27.59 kg/m².      Wt Readings from Last 3 Encounters:   06/25/21 146 lb (66.2 kg)   04/07/21 140 lb (63.5 kg)   01/04/21 144 lb 9.6 oz (65.6 kg)      BP Readings from Last 3 Encounters:   06/25/21 138/72   04/07/21 (!) 162/79   01/04/21 138/82        Physical Examination:    · CONSTITUTIONAL: Well developed, well nourished  · EYES: PERRLA. No xanthelasma, sclera non icteric  · EARS,NOSE,MOUTH,THROAT:  Mucous membranes moist, normal hearing  · NECK: Supple, JVP normal, thyroid not enlarged. Carotids 2+ without bruits  · RESPIRATORY: Normal effort, no rales or rhonchi  · CARDIOVASCULAR: Normal PMI, regular rate and rhythm, no murmurs, rub or gallop. No edema. Radial pulses present and equal  · CHEST: No scar or masses  · ABDOMEN: Normal bowel sounds. No masses or tenderness. No bruit  · MUSCULOSKELETAL: No clubbing or cyanosis. Moves all extremities well. Normal gait  · SKIN:  Warm and dry. No rashes  · NEUROLOGIC: Cranial nerves intact. Alert and oriented  · PSYCHIATRIC: Calm affect. Appears to have normal judgement and insight    All testing and labs listed below were personally reviewed by myself. Stress Jan '19  Summary  There is a moderate-large anterior, septal and apical fixed defect c/w scar. There is no significant ischemia. There is anterior, septal and apical hypokinesis with EF=40%  Intermediate risk. Similar to 2014 study     Echo~ April '18  Summary  Left ventricular function is reduced with ejection fraction estimated at 40%. There is severe hypokinesis of the apical anterioseptal wall. Diastolic filling parameters suggest grade I diastolic dysfunction with normal filling pressures. E/e''=15.5  The mitral valve is normal in structure . Mild mitral regurgitation is present. Mild aortic regurgitation is present. Assessment/Plan  1. Chest pain, unspecified type    2. Coronary artery disease involving native heart without angina pectoris, unspecified vessel or lesion type          CAD (coronary artery disease)  Angina: atypical  Intervention~ PCI to LAD 2010  Current meds~ plavix  Plan~normal stress 2019      Chest pain  Atypical angina. Chest pressure with BOOKER. Concerning for new CAD.   Plan~ lexiscan, if normal keep f/u with NPKA        Orders Placed This Encounter   Procedures    Stress test

## 2021-06-25 NOTE — TELEPHONE ENCOUNTER
Pt states she has been having chest tightness and pain x 4-5 days along with  Fatigue and  Sob with activity. Pt has not taken her BP.  Please call to advise

## 2021-07-04 ENCOUNTER — HOSPITAL ENCOUNTER (INPATIENT)
Age: 71
LOS: 3 days | Discharge: HOME | DRG: 287 | End: 2021-07-07
Attending: EMERGENCY MEDICINE | Admitting: FAMILY MEDICINE
Payer: MEDICARE

## 2021-07-04 ENCOUNTER — APPOINTMENT (OUTPATIENT)
Dept: GENERAL RADIOLOGY | Age: 71
DRG: 287 | End: 2021-07-04
Payer: MEDICARE

## 2021-07-04 DIAGNOSIS — I25.10 CORONARY ARTERY DISEASE DUE TO LIPID RICH PLAQUE: ICD-10-CM

## 2021-07-04 DIAGNOSIS — R07.9 CHEST PAIN, UNSPECIFIED TYPE: Primary | ICD-10-CM

## 2021-07-04 DIAGNOSIS — Z95.5 HISTORY OF CORONARY ANGIOPLASTY WITH INSERTION OF STENT: ICD-10-CM

## 2021-07-04 DIAGNOSIS — I25.83 CORONARY ARTERY DISEASE DUE TO LIPID RICH PLAQUE: ICD-10-CM

## 2021-07-04 DIAGNOSIS — Z91.89 CARDIOVASCULAR RISK FACTOR: ICD-10-CM

## 2021-07-04 LAB
A/G RATIO: 1.4 (ref 1.1–2.2)
ALBUMIN SERPL-MCNC: 4 G/DL (ref 3.4–5)
ALP BLD-CCNC: 96 U/L (ref 40–129)
ALT SERPL-CCNC: 9 U/L (ref 10–40)
ANION GAP SERPL CALCULATED.3IONS-SCNC: 10 MMOL/L (ref 3–16)
AST SERPL-CCNC: 17 U/L (ref 15–37)
BASE EXCESS VENOUS: 2.1 MMOL/L (ref -3–3)
BASOPHILS ABSOLUTE: 0.1 K/UL (ref 0–0.2)
BASOPHILS RELATIVE PERCENT: 1 %
BILIRUB SERPL-MCNC: 0.5 MG/DL (ref 0–1)
BUN BLDV-MCNC: 8 MG/DL (ref 7–20)
CALCIUM SERPL-MCNC: 9.2 MG/DL (ref 8.3–10.6)
CARBOXYHEMOGLOBIN: 7.2 % (ref 0–1.5)
CHLORIDE BLD-SCNC: 101 MMOL/L (ref 99–110)
CO2: 24 MMOL/L (ref 21–32)
CREAT SERPL-MCNC: 0.6 MG/DL (ref 0.6–1.2)
EOSINOPHILS ABSOLUTE: 0.3 K/UL (ref 0–0.6)
EOSINOPHILS RELATIVE PERCENT: 4.7 %
GFR AFRICAN AMERICAN: >60
GFR NON-AFRICAN AMERICAN: >60
GLOBULIN: 2.9 G/DL
GLUCOSE BLD-MCNC: 117 MG/DL (ref 70–99)
HCO3 VENOUS: 26.4 MMOL/L (ref 23–29)
HCT VFR BLD CALC: 43.5 % (ref 36–48)
HEMOGLOBIN: 14.2 G/DL (ref 12–16)
INR BLD: 0.97 (ref 0.88–1.12)
LYMPHOCYTES ABSOLUTE: 1.5 K/UL (ref 1–5.1)
LYMPHOCYTES RELATIVE PERCENT: 20 %
MCH RBC QN AUTO: 29 PG (ref 26–34)
MCHC RBC AUTO-ENTMCNC: 32.6 G/DL (ref 31–36)
MCV RBC AUTO: 89 FL (ref 80–100)
METHEMOGLOBIN VENOUS: 0.1 %
MONOCYTES ABSOLUTE: 0.6 K/UL (ref 0–1.3)
MONOCYTES RELATIVE PERCENT: 8.7 %
NEUTROPHILS ABSOLUTE: 4.8 K/UL (ref 1.7–7.7)
NEUTROPHILS RELATIVE PERCENT: 65.6 %
O2 CONTENT, VEN: 19 VOL %
O2 SAT, VEN: 100 %
O2 THERAPY: ABNORMAL
PCO2, VEN: 39.2 MMHG (ref 40–50)
PDW BLD-RTO: 13.5 % (ref 12.4–15.4)
PH VENOUS: 7.44 (ref 7.35–7.45)
PLATELET # BLD: 209 K/UL (ref 135–450)
PMV BLD AUTO: 7.3 FL (ref 5–10.5)
PO2, VEN: 145 MMHG (ref 25–40)
POTASSIUM REFLEX MAGNESIUM: 4.2 MMOL/L (ref 3.5–5.1)
PRO-BNP: 324 PG/ML (ref 0–124)
PROTHROMBIN TIME: 10.9 SEC (ref 9.9–12.7)
RBC # BLD: 4.89 M/UL (ref 4–5.2)
SODIUM BLD-SCNC: 135 MMOL/L (ref 136–145)
TCO2 CALC VENOUS: 62 MMOL/L
TOTAL PROTEIN: 6.9 G/DL (ref 6.4–8.2)
TROPONIN: <0.01 NG/ML
WBC # BLD: 7.3 K/UL (ref 4–11)

## 2021-07-04 PROCEDURE — 6370000000 HC RX 637 (ALT 250 FOR IP): Performed by: FAMILY MEDICINE

## 2021-07-04 PROCEDURE — G0378 HOSPITAL OBSERVATION PER HR: HCPCS

## 2021-07-04 PROCEDURE — 80053 COMPREHEN METABOLIC PANEL: CPT

## 2021-07-04 PROCEDURE — 1200000000 HC SEMI PRIVATE

## 2021-07-04 PROCEDURE — 2580000003 HC RX 258: Performed by: FAMILY MEDICINE

## 2021-07-04 PROCEDURE — 36415 COLL VENOUS BLD VENIPUNCTURE: CPT

## 2021-07-04 PROCEDURE — 82803 BLOOD GASES ANY COMBINATION: CPT

## 2021-07-04 PROCEDURE — 85610 PROTHROMBIN TIME: CPT

## 2021-07-04 PROCEDURE — 93005 ELECTROCARDIOGRAM TRACING: CPT | Performed by: EMERGENCY MEDICINE

## 2021-07-04 PROCEDURE — 99283 EMERGENCY DEPT VISIT LOW MDM: CPT

## 2021-07-04 PROCEDURE — 83880 ASSAY OF NATRIURETIC PEPTIDE: CPT

## 2021-07-04 PROCEDURE — 84484 ASSAY OF TROPONIN QUANT: CPT

## 2021-07-04 PROCEDURE — 6370000000 HC RX 637 (ALT 250 FOR IP): Performed by: PHYSICIAN ASSISTANT

## 2021-07-04 PROCEDURE — 85025 COMPLETE CBC W/AUTO DIFF WBC: CPT

## 2021-07-04 PROCEDURE — 71045 X-RAY EXAM CHEST 1 VIEW: CPT

## 2021-07-04 RX ORDER — CLOPIDOGREL BISULFATE 75 MG/1
75 TABLET ORAL DAILY
Status: DISCONTINUED | OUTPATIENT
Start: 2021-07-05 | End: 2021-07-07 | Stop reason: HOSPADM

## 2021-07-04 RX ORDER — PRAVASTATIN SODIUM 80 MG/1
80 TABLET ORAL DAILY
Status: DISCONTINUED | OUTPATIENT
Start: 2021-07-05 | End: 2021-07-07 | Stop reason: HOSPADM

## 2021-07-04 RX ORDER — ACETAMINOPHEN 325 MG/1
650 TABLET ORAL EVERY 6 HOURS PRN
Status: DISCONTINUED | OUTPATIENT
Start: 2021-07-04 | End: 2021-07-07

## 2021-07-04 RX ORDER — LISINOPRIL 20 MG/1
40 TABLET ORAL DAILY
Status: DISCONTINUED | OUTPATIENT
Start: 2021-07-04 | End: 2021-07-04

## 2021-07-04 RX ORDER — CARVEDILOL 25 MG/1
25 TABLET ORAL 2 TIMES DAILY WITH MEALS
Status: DISCONTINUED | OUTPATIENT
Start: 2021-07-04 | End: 2021-07-07 | Stop reason: HOSPADM

## 2021-07-04 RX ORDER — ASPIRIN 81 MG/1
81 TABLET, CHEWABLE ORAL DAILY
Status: DISCONTINUED | OUTPATIENT
Start: 2021-07-05 | End: 2021-07-07 | Stop reason: HOSPADM

## 2021-07-04 RX ORDER — HYDROXYZINE PAMOATE 25 MG/1
25 CAPSULE ORAL 3 TIMES DAILY PRN
COMMUNITY

## 2021-07-04 RX ORDER — NITROGLYCERIN 0.4 MG/1
0.4 TABLET SUBLINGUAL EVERY 5 MIN PRN
Status: DISCONTINUED | OUTPATIENT
Start: 2021-07-04 | End: 2021-07-07 | Stop reason: HOSPADM

## 2021-07-04 RX ORDER — AMLODIPINE BESYLATE 5 MG/1
5 TABLET ORAL NIGHTLY
Status: DISCONTINUED | OUTPATIENT
Start: 2021-07-05 | End: 2021-07-07 | Stop reason: HOSPADM

## 2021-07-04 RX ORDER — SODIUM CHLORIDE 0.9 % (FLUSH) 0.9 %
5-40 SYRINGE (ML) INJECTION PRN
Status: DISCONTINUED | OUTPATIENT
Start: 2021-07-04 | End: 2021-07-07 | Stop reason: HOSPADM

## 2021-07-04 RX ORDER — ONDANSETRON 4 MG/1
4 TABLET, ORALLY DISINTEGRATING ORAL EVERY 8 HOURS PRN
Status: DISCONTINUED | OUTPATIENT
Start: 2021-07-04 | End: 2021-07-07 | Stop reason: HOSPADM

## 2021-07-04 RX ORDER — ACETAMINOPHEN 650 MG/1
650 SUPPOSITORY RECTAL EVERY 6 HOURS PRN
Status: DISCONTINUED | OUTPATIENT
Start: 2021-07-04 | End: 2021-07-07 | Stop reason: HOSPADM

## 2021-07-04 RX ORDER — NITROGLYCERIN 0.4 MG/1
0.4 TABLET SUBLINGUAL EVERY 5 MIN PRN
Status: DISCONTINUED | OUTPATIENT
Start: 2021-07-04 | End: 2021-07-04 | Stop reason: SDUPTHER

## 2021-07-04 RX ORDER — ASPIRIN 81 MG/1
324 TABLET, CHEWABLE ORAL ONCE
Status: COMPLETED | OUTPATIENT
Start: 2021-07-04 | End: 2021-07-04

## 2021-07-04 RX ORDER — ONDANSETRON 2 MG/ML
4 INJECTION INTRAMUSCULAR; INTRAVENOUS EVERY 6 HOURS PRN
Status: DISCONTINUED | OUTPATIENT
Start: 2021-07-04 | End: 2021-07-07 | Stop reason: HOSPADM

## 2021-07-04 RX ORDER — LISINOPRIL 20 MG/1
40 TABLET ORAL DAILY
Status: DISCONTINUED | OUTPATIENT
Start: 2021-07-05 | End: 2021-07-07 | Stop reason: HOSPADM

## 2021-07-04 RX ORDER — SODIUM CHLORIDE 0.9 % (FLUSH) 0.9 %
5-40 SYRINGE (ML) INJECTION EVERY 12 HOURS SCHEDULED
Status: DISCONTINUED | OUTPATIENT
Start: 2021-07-04 | End: 2021-07-07 | Stop reason: HOSPADM

## 2021-07-04 RX ORDER — SODIUM CHLORIDE 9 MG/ML
25 INJECTION, SOLUTION INTRAVENOUS PRN
Status: DISCONTINUED | OUTPATIENT
Start: 2021-07-04 | End: 2021-07-07 | Stop reason: HOSPADM

## 2021-07-04 RX ORDER — POLYETHYLENE GLYCOL 3350 17 G/17G
17 POWDER, FOR SOLUTION ORAL DAILY PRN
Status: DISCONTINUED | OUTPATIENT
Start: 2021-07-04 | End: 2021-07-07 | Stop reason: HOSPADM

## 2021-07-04 RX ADMIN — CARVEDILOL 25 MG: 25 TABLET, FILM COATED ORAL at 18:22

## 2021-07-04 RX ADMIN — Medication 10 ML: at 20:16

## 2021-07-04 RX ADMIN — ASPIRIN 324 MG: 81 TABLET, CHEWABLE ORAL at 16:30

## 2021-07-04 RX ADMIN — NITROGLYCERIN 1 INCH: 20 OINTMENT TOPICAL at 16:30

## 2021-07-04 ASSESSMENT — ENCOUNTER SYMPTOMS
CHEST TIGHTNESS: 0
NAUSEA: 0
BACK PAIN: 0
ABDOMINAL PAIN: 0
SHORTNESS OF BREATH: 0
DIARRHEA: 0
VOMITING: 0

## 2021-07-04 ASSESSMENT — PAIN SCALES - GENERAL: PAINLEVEL_OUTOF10: 0

## 2021-07-04 ASSESSMENT — HEART SCORE: ECG: 1

## 2021-07-04 NOTE — ED NOTES
Pt report given to LEVI Cnoner, states no questions or concerns at this time.       [facility], RN  07/04/21 8976

## 2021-07-04 NOTE — PROGRESS NOTES
6:26 PM  Pt up from ED. Admission assessment complete. VSS. Denies chest pain or SOB at this time. Oriented to room. Call light within reach. Pt demonstrated how to use properly. Will continue to monitor. Denies further needs. The care plan and education has been reviewed and mutually agreed upon with the patient.

## 2021-07-04 NOTE — ED NOTES
Pt transported to floor via wheelchair by floor RN with portable telemetry on throughout transport.       [facility], RN  07/04/21 1169

## 2021-07-04 NOTE — ED PROVIDER NOTES
I independently performed a history and physical on Valentino Ahmadi. All diagnostic, treatment, and disposition decisions were made by myself in conjunction with the advanced practice provider. Briefly, this is a 66 y.o. female here for about 1 week of intermittent waxing and waning exertional chest pain with occasional palpiations. On exam, WDWN F, NAD, Heart RRR, Lungs CTA. EKG   EKG reviewed by myself  Dated today, 1542  Rate 67, NSR, RBBB  No change from 7 Apr 21. Rosamaria Tobias MD  07/04/21 1547            Screenings           Heart Score for chest pain patients  History: Highly Suspicious  ECG: Non-Specifc repolarization disturbance/LBTB/PM  Patient Age: > 65 years  *Risk factors for Atherosclerotic disease: Cigarette smoking, Hypercholesterolemia, Hypertension, Coronary Artery Disease  Risk Factors: > 3 Risk factors or history of atherosclerotic disease*  Troponin: < 1X normal limit  Heart Score Total: 7       MDM  Chest pain with heart score of 7    Patient Referrals:  No follow-up provider specified. Discharge Medications:  New Prescriptions    No medications on file       FINAL IMPRESSION  1. Chest pain, unspecified type    2. Coronary artery disease due to lipid rich plaque    3. Cardiovascular risk factor    4. History of coronary angioplasty with insertion of stent        Blood pressure 133/74, pulse 76, temperature 98.4 °F (36.9 °C), temperature source Oral, resp. rate 21, height 5' 1\" (1.549 m), weight 144 lb 6.4 oz (65.5 kg), SpO2 96 %. For further details of [facility] emergency department encounter, please see documentation by advanced practice provider, Galilea Graves.          Rosamaria Tobias MD  07/04/21 2234

## 2021-07-04 NOTE — ED PROVIDER NOTES
[facility]        Pt Name: Heide Batista  MRN: [id number]  [place] 1950  Date of evaluation: 7/4/2021  Provider: Cami Aquino PA-C  PCP: Mike Cooper  Note Started: 3:21 PM EDT       SHABANA. I have evaluated this patient. My supervising physician was available for consultation. CHIEF COMPLAINT       Chief Complaint   Patient presents with    Chest Pain     states chest pain and tightness x couple weeks off and on. cardiac HX noted. HISTORY OF PRESENT ILLNESS   (Location, Timing/Onset, Context/Setting, Quality, Duration, Modifying Factors, Severity, Associated Signs and Symptoms)  Note limiting factors. Chief Complaint: Midsternal chest pain and pressure    Heide Batista is a 66 y.o. female who presents to the emergency department today with reports of intermittent symptoms over the past week or so of chest pain. Patient states she has an extensive cardiac history and is under the active care of Dr. Myriam Winter. She states that she did not really think much of the pain and discomfort that she has had intermittent and relatively brief and during the course of the week. She states over the past 2 days is increased in intensity. She states yesterday it lasted for couple of hours and was relieved with nitroglycerin. Unfortunate when it came back earlier the next morning it was with the heaviest amount of pressure and pain that she is experiencing the midportion of her chest.  She states for whatever reason she did not take her nitroglycerin today. She goes on to report she describes a heaviness. She denies fevers chills or cough and is denying shortness of breath. She denies abdominal or flank pain. No additional GI or  complaints. Equivocal nausea without vomiting or diarrhea. No concerns for Covid and history of Covid vaccination.   Upon questioning she states she was supposed to have some testing performed but never had it done. She has a documented history of previous coronary artery disease as well as cardiomyopathy and presents the ED for evaluation and treatment of her chest pain. Nursing Notes were all reviewed and agreed with or any disagreements were addressed in the HPI. REVIEW OF SYSTEMS    (2-9 systems for level 4, 10 or more for level 5)     Review of Systems   Constitutional: Negative for activity change, chills and fever. Respiratory: Negative for chest tightness and shortness of breath. Cardiovascular: Positive for chest pain. Gastrointestinal: Negative for abdominal pain, diarrhea, nausea and vomiting. Genitourinary: Negative for dysuria and flank pain. Musculoskeletal: Negative for back pain and myalgias. Skin: Negative for rash and wound. Positives and Pertinent negatives as per HPI. Except as noted above in the ROS, all other systems were reviewed and negative.        PAST MEDICAL HISTORY     Past Medical History:   Diagnosis Date    Arthritis     CAD (coronary artery disease)     Cardiomyopathy ([facility] [address]     GERD (gastroesophageal reflux disease)     Hypertension     IBS (irritable bowel syndrome)     Incontinence of urine          SURGICAL HISTORY     Past Surgical History:   Procedure Laterality Date    BLADDER SURGERY  2010    sling    BLADDER SUSPENSION      COLONOSCOPY      COLONOSCOPY N/A 11/17/2020    COLONOSCOPY POLYPECTOMY SNARE/COLD BIOPSY performed by Kamari Trejo MD at [facility] COLONOSCOPY  11/17/2020    COLONOSCOPY WITH BIOPSY performed by Kamari Trejo MD at [facility] N/A 11/19/2020    HEMORRHOIDECTOMY performed by Kamari Trejo MD at [address] [facility] [address]  2010    coronary stent to LAD    OTHER SURGICAL HISTORY      TVT, A/P REPAIR          CURRENTMEDICATIONS       Previous Medications    AMLODIPINE (NORVASC) 10 MG TABLET    Take 5 mg by mouth    ASPIRIN 81 MG CHEWABLE TABLET    Take 1 tablet by mouth daily    CARVEDILOL (COREG) 25 MG TABLET    TAKE 1 TABLET TWICE A DAY WITH MEALS    CLOPIDOGREL (PLAVIX) 75 MG TABLET    TAKE 1 TABLET DAILY    FAMOTIDINE (PEPCID) 20 MG TABLET    Take 1 tablet by mouth 2 times daily    LISINOPRIL (PRINIVIL;ZESTRIL) 40 MG TABLET    TAKE 1 TABLET BY MOUTH EVERY DAY    NITROGLYCERIN (NITROSTAT) 0.4 MG SL TABLET    Place 1 tablet under the tongue every 5 minutes as needed for Chest pain    PRAVASTATIN (PRAVACHOL) 80 MG TABLET    Take 1 tablet by mouth daily         ALLERGIES     Patient has no known allergies. FAMILYHISTORY       Family History   Problem Relation Age of Onset    High Blood Pressure Mother     Stroke Mother     Heart Disease Father           SOCIAL HISTORY       Social History     Tobacco Use    Smoking status: Current Every Day Smoker     Packs/day: 0.50     Years: 50.00     Pack years: 25.00     Types: Cigarettes    Smokeless tobacco: Never Used   Vaping Use    Vaping Use: Never used   Substance Use Topics    Alcohol use: No    Drug use: No       SCREENINGS      Heart Score for chest pain patients  History: Highly Suspicious  ECG: Non-Specifc repolarization disturbance/LBTB/PM  Patient Age: > 65 years  *Risk factors for Atherosclerotic disease: Cigarette smoking, Hypercholesterolemia, Hypertension, Coronary Artery Disease  Risk Factors: > 3 Risk factors or history of atherosclerotic disease*  Troponin: < 1X normal limit  Heart Score Total: 7      PHYSICAL EXAM    (up to 7 for level 4, 8 or more for level 5)     ED Triage Vitals   BP Temp Temp src Pulse Resp SpO2 Height Weight   -- -- -- -- -- -- -- --       Physical Exam  Vitals and nursing note reviewed. Constitutional:       General: She is awake. She is not in acute distress. Appearance: Normal appearance. She is well-developed. She is ill-appearing (chronically). She is not diaphoretic.    HENT:      Head: components within normal limits    Narrative:     Performed at:  [facility] [address], [facility], [facility]   Phone [phone]   BLOOD GAS, VENOUS - Abnormal; Notable for the following components:    pCO2, Jatinder 39.2 (*)     pO2, Jatinder 145.0 (*)     Carboxyhemoglobin 7.2 (*)     All other components within normal limits    Narrative:     Performed at:  [facility] [address], [facility], [address] [place] [address]   Phone [phone]   CBC WITH AUTO DIFFERENTIAL    Narrative:     Performed at:  [facility] [address], [facility], [address] [place] [address]   Phone [phone]   TROPONIN    Narrative:     Performed at:  [facility] [address], [facility], [address] [place] [address]   Phone [phone]   PROTIME-INR    Narrative:     Performed at:  [facility] [address], [facility], [facility]   Phone [phone]       When ordered only abnormal lab results are displayed. All other labs were within normal range or not returned as of this dictation. EKG: When ordered, EKG's are interpreted by the Emergency Department Physician in the absence of a cardiologist.  Please see their note for interpretation of EKG.     RADIOLOGY:   Non-plain film images such as CT, Ultrasound and MRI are read by the radiologist. Plain radiographic images are visualized and preliminarily interpreted by the ED Provider with the below findings:        Interpretation per the Radiologist below, if available at the time of this note:    XR CHEST PORTABLE   Final Result   No acute disease             PROCEDURES   Unless otherwise noted below, none     Procedures    CRITICAL CARE TIME   N/A    CONSULTS:  None      EMERGENCY DEPARTMENT COURSE and DIFFERENTIAL DIAGNOSIS/MDM:   Vitals:    Vitals:    07/04/21 1522 07/04/21 1551 07/04/21 1600 07/04/21 1630   BP: 133/75 (!) 160/67 129/68 133/74   Pulse: 72 72 67 76   Resp: 20 21 17 21   Temp: 98.4 °F (36.9 °C)      TempSrc: Oral      SpO2: 100%  99% 96%   Weight: 144 lb 6.4 oz (65.5 kg)      Height: 5' 1\" (1.549 m)          Patient was given the following medications:  Medications   aspirin chewable tablet 324 mg (324 mg Oral Given 7/4/21 1630)   nitroglycerin (NITRO-BID) 2 % ointment 1 inch (1 inch Topical Given 7/4/21 1630)           The patient's detailed history of present illness is documented as above. Upon arrival to the emergency department the patient's vital signs are as documented. The patient is noted to be hemodynamically stable and afebrile. Physical examination findings are as above. Symptomatology was treated with nitroglycerin paste as well as aspirin as above. EKG performed upon arrival demonstrates a sinus rhythm with a rate of 67. Normal axis and interval.  Evidence of a right bundle branch block. No evidence of acute ST elevation please see attending physician details for further EKG interpretation as well as comparison. Portable chest x-ray demonstrates no evidence of acute cardiopulmonary process. CBC demonstrates no evidence of leukocytosis anemia and/or thrombocytopenia. BUN is 8 creatinine is 0.6 nonfasting glucose 117 electrolytes and LFTs are as documented. Troponin is less than 0.01. BN P is 324 INR is 0.97. His blood gas demonstrates a normal pH without hypercapnia or hypoxia. Heart score is as documented as above and of concern to the patient with known coronary artery disease. She was supposed to have a stress test performed in June but did not have this completed. With that being said her last stress test appears to have been in 2019 with the patient with known coronary artery disease in the last Doppler dates back to 2018. She will need ongoing care management on an inpatient basis.   Case discussed with the hospitalist.  Patient will be admitted to medical surgical services for ongoing care management the diagnoses below.    FINAL IMPRESSION      1. Chest pain, unspecified type    2. Coronary artery disease due to lipid rich plaque    3. Cardiovascular risk factor    4.  History of coronary angioplasty with insertion of stent          DISPOSITION/PLAN   DISPOSITION: Admit medical stable condition         (Please note that portions of this note were completed with a voice recognition program.  Efforts were made to edit the dictations but occasionally words are mis-transcribed.)    Marybeth Read PA-C (electronically signed)           Alejandra Cee PA-C  07/04/21 2051

## 2021-07-05 LAB
EKG ATRIAL RATE: 67 BPM
EKG DIAGNOSIS: NORMAL
EKG P AXIS: 63 DEGREES
EKG P-R INTERVAL: 166 MS
EKG Q-T INTERVAL: 452 MS
EKG QRS DURATION: 140 MS
EKG QTC CALCULATION (BAZETT): 477 MS
EKG R AXIS: -26 DEGREES
EKG T AXIS: 73 DEGREES
EKG VENTRICULAR RATE: 67 BPM

## 2021-07-05 PROCEDURE — 93010 ELECTROCARDIOGRAM REPORT: CPT | Performed by: INTERNAL MEDICINE

## 2021-07-05 PROCEDURE — 2580000003 HC RX 258: Performed by: FAMILY MEDICINE

## 2021-07-05 PROCEDURE — 6370000000 HC RX 637 (ALT 250 FOR IP): Performed by: HOSPITALIST

## 2021-07-05 PROCEDURE — G0378 HOSPITAL OBSERVATION PER HR: HCPCS

## 2021-07-05 PROCEDURE — 6360000002 HC RX W HCPCS: Performed by: FAMILY MEDICINE

## 2021-07-05 PROCEDURE — 6370000000 HC RX 637 (ALT 250 FOR IP): Performed by: FAMILY MEDICINE

## 2021-07-05 PROCEDURE — 6370000000 HC RX 637 (ALT 250 FOR IP): Performed by: PHYSICIAN ASSISTANT

## 2021-07-05 PROCEDURE — 1200000000 HC SEMI PRIVATE

## 2021-07-05 RX ORDER — FAMOTIDINE 20 MG/1
20 TABLET, FILM COATED ORAL 2 TIMES DAILY
Status: DISCONTINUED | OUTPATIENT
Start: 2021-07-05 | End: 2021-07-06

## 2021-07-05 RX ORDER — BUDESONIDE AND FORMOTEROL FUMARATE DIHYDRATE 160; 4.5 UG/1; UG/1
2 AEROSOL RESPIRATORY (INHALATION) 2 TIMES DAILY
Status: DISCONTINUED | OUTPATIENT
Start: 2021-07-05 | End: 2021-07-07 | Stop reason: HOSPADM

## 2021-07-05 RX ORDER — IPRATROPIUM BROMIDE AND ALBUTEROL SULFATE 2.5; .5 MG/3ML; MG/3ML
1 SOLUTION RESPIRATORY (INHALATION) 2 TIMES DAILY
Status: DISCONTINUED | OUTPATIENT
Start: 2021-07-05 | End: 2021-07-07 | Stop reason: HOSPADM

## 2021-07-05 RX ORDER — LANOLIN ALCOHOL/MO/W.PET/CERES
9 CREAM (GRAM) TOPICAL NIGHTLY PRN
Status: DISCONTINUED | OUTPATIENT
Start: 2021-07-05 | End: 2021-07-07 | Stop reason: HOSPADM

## 2021-07-05 RX ADMIN — FAMOTIDINE 20 MG: 20 TABLET, FILM COATED ORAL at 20:59

## 2021-07-05 RX ADMIN — FAMOTIDINE 20 MG: 20 TABLET, FILM COATED ORAL at 12:57

## 2021-07-05 RX ADMIN — CARVEDILOL 25 MG: 25 TABLET, FILM COATED ORAL at 16:34

## 2021-07-05 RX ADMIN — ASPIRIN 81 MG: 81 TABLET, CHEWABLE ORAL at 08:17

## 2021-07-05 RX ADMIN — PRAVASTATIN SODIUM 80 MG: 80 TABLET ORAL at 08:17

## 2021-07-05 RX ADMIN — CLOPIDOGREL BISULFATE 75 MG: 75 TABLET ORAL at 08:17

## 2021-07-05 RX ADMIN — LISINOPRIL 40 MG: 20 TABLET ORAL at 08:17

## 2021-07-05 RX ADMIN — ENOXAPARIN SODIUM 40 MG: 40 INJECTION SUBCUTANEOUS at 08:16

## 2021-07-05 RX ADMIN — Medication 10 ML: at 08:18

## 2021-07-05 RX ADMIN — CARVEDILOL 25 MG: 25 TABLET, FILM COATED ORAL at 08:17

## 2021-07-05 RX ADMIN — Medication 10 ML: at 21:00

## 2021-07-05 RX ADMIN — MELATONIN TAB 3 MG 9 MG: 3 TAB at 00:10

## 2021-07-05 RX ADMIN — ACETAMINOPHEN 650 MG: 325 TABLET ORAL at 16:34

## 2021-07-05 RX ADMIN — AMLODIPINE BESYLATE 5 MG: 5 TABLET ORAL at 21:00

## 2021-07-05 ASSESSMENT — PAIN DESCRIPTION - PAIN TYPE: TYPE: ACUTE PAIN

## 2021-07-05 ASSESSMENT — PAIN SCALES - GENERAL
PAINLEVEL_OUTOF10: 3
PAINLEVEL_OUTOF10: 0
PAINLEVEL_OUTOF10: 0
PAINLEVEL_OUTOF10: 1

## 2021-07-05 ASSESSMENT — PAIN DESCRIPTION - LOCATION: LOCATION: HEAD

## 2021-07-05 NOTE — PROGRESS NOTES
Hospitalist Progress Note      PCP: Khadra Bear    Date of Admission: 7/4/2021    Chief Complaint: Chest pain and tightness    Hospital Course: This is 65-year-old female with a history of coronary artery disease status post stent to LAD in 2010, hypertension, cardiomyopathy, tobacco abuse admitted with a chest pain and tightness patient is followed by cardiologist Dr. Dariana Joiner: Patient denies any chest pain now no shortness of breath no nausea vomiting she is smoking half pack per day since age 14      Medications:  Reviewed    Infusion Medications    sodium chloride       Scheduled Medications    aspirin  81 mg Oral Daily    amLODIPine  5 mg Oral Nightly    clopidogrel  75 mg Oral Daily    pravastatin  80 mg Oral Daily    carvedilol  25 mg Oral BID WC    sodium chloride flush  5-40 mL Intravenous 2 times per day    enoxaparin  40 mg Subcutaneous Daily    lisinopril  40 mg Oral Daily     PRN Meds: melatonin, nitroGLYCERIN, sodium chloride flush, sodium chloride, ondansetron **OR** ondansetron, polyethylene glycol, acetaminophen **OR** acetaminophen    No intake or output data in the 24 hours ending 07/05/21 0938    Physical Exam Performed:    BP (!) 142/67   Pulse 76   Temp 97.7 °F (36.5 °C) (Oral)   Resp 18   Ht 5' 1\" (1.549 m)   Wt 144 lb 6.4 oz (65.5 kg)   SpO2 91%   BMI 27.28 kg/m²     General appearance: No apparent distress, appears stated age and cooperative. HEENT: Pupils equal, round, and reactive to light. Conjunctivae/corneas clear. Neck: Supple, with full range of motion. No jugular venous distention. Trachea midline. Respiratory:  Normal respiratory effort. Decreased breath sounds occasional wheezing. Cardiovascular: Regular rate and rhythm with normal S1/S2 without murmurs, rubs or gallops. Abdomen: Soft, non-tender, non-distended with normal bowel sounds. Musculoskeletal: No clubbing, cyanosis or edema bilaterally.   Full range of motion without deformity. Skin: Skin color, texture, turgor normal.  No rashes or lesions. Neurologic:  Neurovascularly intact without any focal sensory/motor deficits. Cranial nerves: II-XII intact, grossly non-focal.  Psychiatric: Alert and oriented, thought content appropriate, normal insight  Capillary Refill: Brisk,3 seconds, normal   Peripheral Pulses: +2 palpable, equal bilaterally       Labs:   Recent Labs     07/04/21  1527   WBC 7.3   HGB 14.2   HCT 43.5        Recent Labs     07/04/21  1527   *   K 4.2      CO2 24   BUN 8   CREATININE 0.6   CALCIUM 9.2     Recent Labs     07/04/21  1527   AST 17   ALT 9*   BILITOT 0.5   ALKPHOS 96     Recent Labs     07/04/21  1527   INR 0.97     Recent Labs     07/04/21  1527 07/04/21  1816 07/04/21  2203   TROPONINI <0.01 <0.01 <0.01       Urinalysis:      Lab Results   Component Value Date    NITRU Negative 04/07/2021    WBCUA 1 04/07/2021    RBCUA 5 04/07/2021    BLOODU SMALL 04/07/2021    SPECGRAV 1.005 04/07/2021    GLUCOSEU Negative 04/07/2021       Radiology:  XR CHEST PORTABLE   Final Result   No acute disease         NM MYOCARDIAL SPECT REST EXERCISE OR RX    (Results Pending)           Assessment/Plan:    Active Hospital Problems    Diagnosis     Chest pain [R07.9]      1. This 74-year-old female with a history of coronary artery disease admitted with chest pain cardiac enzymes negative, stress test ordered pending consult cardiology. 2.  History of tobacco abuse counseled and educated recommended patient to stop smoking. 3.  Questionable underlying COPD start on hand-held nebulizer, Symbicort. 4.   Hypertension continue with home medication. 5.   Hyperlipidemia continue with home medication    DVT Prophylaxis: Lovenox subcu  Diet: ADULT DIET;  Regular  Code Status: Full Code    PT/OT Eval Status: Not indicated    Dispo -once cleared from cardiology    Loc Herrera MD

## 2021-07-05 NOTE — H&P
(2010); Colonoscopy; other surgical history (2010); Coronary angioplasty with stent; bladder suspension; other surgical history; Colonoscopy (N/A, 11/17/2020); Colonoscopy (11/17/2020); and Hemorrhoid surgery (N/A, 11/19/2020). Medications:  No current facility-administered medications on file prior to encounter.      Current Outpatient Medications on File Prior to Encounter   Medication Sig Dispense Refill    hydrOXYzine (VISTARIL) 25 MG capsule Take 25 mg by mouth 3 times daily as needed for Itching      lisinopril (PRINIVIL;ZESTRIL) 40 MG tablet TAKE 1 TABLET BY MOUTH EVERY DAY 90 tablet 3    clopidogrel (PLAVIX) 75 MG tablet TAKE 1 TABLET DAILY 90 tablet 3    aspirin 81 MG chewable tablet Take 1 tablet by mouth daily      pravastatin (PRAVACHOL) 80 MG tablet Take 1 tablet by mouth daily 90 tablet 1    famotidine (PEPCID) 20 MG tablet Take 1 tablet by mouth 2 times daily 60 tablet 3    amLODIPine (NORVASC) 10 MG tablet Take 5 mg by mouth      nitroGLYCERIN (NITROSTAT) 0.4 MG SL tablet Place 1 tablet under the tongue every 5 minutes as needed for Chest pain 25 tablet 1    carvedilol (COREG) 25 MG tablet TAKE 1 TABLET TWICE A DAY WITH MEALS 180 tablet 3     Current Facility-Administered Medications   Medication Dose Route Frequency Provider Last Rate Last Admin    melatonin tablet 9 mg  9 mg Oral Nightly PRN Bianca Elena PA-C   9 mg at 07/05/21 0010    aspirin chewable tablet 81 mg  81 mg Oral Daily Jagruti Marcos MD        nitroGLYCERIN (NITROSTAT) SL tablet 0.4 mg  0.4 mg Sublingual Q5 Min PRN Jagruti Marcos MD        amLODIPine (NORVASC) tablet 5 mg  5 mg Oral Nightly Jagruti Marcos MD        clopidogrel (PLAVIX) tablet 75 mg  75 mg Oral Daily Jagruti Marcos MD        pravastatin (PRAVACHOL) tablet 80 mg  80 mg Oral Daily Jagruti Marcos MD        carvedilol (COREG) tablet 25 mg  25 mg Oral BID WC Jagruti Marcos MD   25 mg at 07/04/21 1727    sodium chloride flush 0.9 % injection 5-40 mL  5-40 Alert and oriented in time, place and person. No speech or motor deficits  Psychiatry: Appropriate affect. Not agitated  Skin: Warm, dry, normal turgor, no rash    Labs:  CBC:   Lab Results   Component Value Date    WBC 7.3 07/04/2021    RBC 4.89 07/04/2021    HGB 14.2 07/04/2021    HCT 43.5 07/04/2021    MCV 89.0 07/04/2021    MCH 29.0 07/04/2021    MCHC 32.6 07/04/2021    RDW 13.5 07/04/2021     07/04/2021    MPV 7.3 07/04/2021     BMP:    Lab Results   Component Value Date     07/04/2021    K 4.2 07/04/2021     07/04/2021    CO2 24 07/04/2021    BUN 8 07/04/2021    CREATININE 0.6 07/04/2021    CALCIUM 9.2 07/04/2021    GFRAA >60 07/04/2021    GFRAA >60 09/04/2010    LABGLOM >60 07/04/2021    GLUCOSE 117 07/04/2021       Chest Xray:   EKG:        Problem List  Active Problems:    Chest pain  Resolved Problems:    * No resolved hospital problems. *        Assessment/Plan:         1. Chest pain   Cont to trend troponin  Cont asa, plavix and nitro  Stress test ordered    HTN   Controlled on Coreg , Lisinopril and Norvasc    Admit as obs I anticipate hospitalization spanning less than two midnights for investigation and treatment of the above medically necessary diagnoses.       Jagruti Marcos MD    [id number]

## 2021-07-06 PROBLEM — I20.0 UNSTABLE ANGINA (HCC): Status: ACTIVE | Noted: 2021-07-06

## 2021-07-06 LAB
LV EF: 38 %
LVEF MODALITY: NORMAL

## 2021-07-06 PROCEDURE — 78452 HT MUSCLE IMAGE SPECT MULT: CPT

## 2021-07-06 PROCEDURE — 6360000002 HC RX W HCPCS: Performed by: FAMILY MEDICINE

## 2021-07-06 PROCEDURE — 2580000003 HC RX 258: Performed by: FAMILY MEDICINE

## 2021-07-06 PROCEDURE — 99222 1ST HOSP IP/OBS MODERATE 55: CPT | Performed by: INTERNAL MEDICINE

## 2021-07-06 PROCEDURE — 93017 CV STRESS TEST TRACING ONLY: CPT | Performed by: INTERNAL MEDICINE

## 2021-07-06 PROCEDURE — A9502 TC99M TETROFOSMIN: HCPCS | Performed by: INTERNAL MEDICINE

## 2021-07-06 PROCEDURE — 3430000000 HC RX DIAGNOSTIC RADIOPHARMACEUTICAL: Performed by: INTERNAL MEDICINE

## 2021-07-06 PROCEDURE — 6370000000 HC RX 637 (ALT 250 FOR IP): Performed by: FAMILY MEDICINE

## 2021-07-06 PROCEDURE — G0378 HOSPITAL OBSERVATION PER HR: HCPCS

## 2021-07-06 PROCEDURE — 1200000000 HC SEMI PRIVATE

## 2021-07-06 PROCEDURE — 6370000000 HC RX 637 (ALT 250 FOR IP): Performed by: HOSPITALIST

## 2021-07-06 RX ORDER — PANTOPRAZOLE SODIUM 40 MG/1
40 TABLET, DELAYED RELEASE ORAL
Status: DISCONTINUED | OUTPATIENT
Start: 2021-07-06 | End: 2021-07-07 | Stop reason: HOSPADM

## 2021-07-06 RX ORDER — PANTOPRAZOLE SODIUM 40 MG/1
40 TABLET, DELAYED RELEASE ORAL
Qty: 30 TABLET | Refills: 3 | Status: SHIPPED | OUTPATIENT
Start: 2021-07-07

## 2021-07-06 RX ADMIN — LISINOPRIL 40 MG: 20 TABLET ORAL at 08:56

## 2021-07-06 RX ADMIN — REGADENOSON 0.4 MG: 0.08 INJECTION, SOLUTION INTRAVENOUS at 11:32

## 2021-07-06 RX ADMIN — AMLODIPINE BESYLATE 5 MG: 5 TABLET ORAL at 21:11

## 2021-07-06 RX ADMIN — FAMOTIDINE 20 MG: 20 TABLET, FILM COATED ORAL at 08:56

## 2021-07-06 RX ADMIN — CLOPIDOGREL BISULFATE 75 MG: 75 TABLET ORAL at 08:55

## 2021-07-06 RX ADMIN — TETROFOSMIN 10 MILLICURIE: 1.38 INJECTION, POWDER, LYOPHILIZED, FOR SOLUTION INTRAVENOUS at 10:49

## 2021-07-06 RX ADMIN — PRAVASTATIN SODIUM 80 MG: 80 TABLET ORAL at 08:56

## 2021-07-06 RX ADMIN — TETROFOSMIN 30 MILLICURIE: 1.38 INJECTION, POWDER, LYOPHILIZED, FOR SOLUTION INTRAVENOUS at 11:41

## 2021-07-06 RX ADMIN — Medication 10 ML: at 08:56

## 2021-07-06 RX ADMIN — CARVEDILOL 25 MG: 25 TABLET, FILM COATED ORAL at 17:44

## 2021-07-06 RX ADMIN — ASPIRIN 81 MG: 81 TABLET, CHEWABLE ORAL at 08:55

## 2021-07-06 RX ADMIN — Medication 10 ML: at 21:11

## 2021-07-06 ASSESSMENT — PAIN SCALES - GENERAL
PAINLEVEL_OUTOF10: 0

## 2021-07-06 NOTE — PROGRESS NOTES
Instructed on walking Lexiscan Stress Test Procedure including possible side effects/ adverse reactions. Patient verbalizes  understanding and denies having any questions. See [address] [place] [address] Rd Cardiology.   Kristian Lott, RN

## 2021-07-06 NOTE — CONSULTS
[facility]  [phone]      Chief Complaint   Patient presents with    Chest Pain     states chest pain and tightness x couple weeks off and on. cardiac HX noted. History of Present Illness:  Collette Diaz is a 66 y.o. patient who presented to the hospital with complaints of chest pain. She has intermittent CP for several weeks but it got worse yesterday so she came to the ED. Pain is pressure like, severe exacerbating She had normal troponin. ECG unchanged from prior. I have been asked to provide consultation regarding further management and testing. Her history includes coronary disease with stent placed to proximal LAD 9/10, cardiomyopathy, hypertension, and hyperlipidemia. Aidan Ocampo performed 12/10 revealed at EF of 37%. Echo 10/13 revealed EF 25-30%. She did have GXT 12/14 and EF was 44 % , ECHO 4/18 showed EF 40%, she continues to smoke 1 PPD.      Past Medical History:   has a past medical history of Arthritis, CAD (coronary artery disease), Cardiomyopathy ([address] GERD (gastroesophageal reflux disease), Hypertension, IBS (irritable bowel syndrome), and Incontinence of urine. Surgical History:   has a past surgical history that includes Hysterectomy; Bladder surgery (2010); Colonoscopy; other surgical history (2010); Coronary angioplasty with stent; bladder suspension; other surgical history; Colonoscopy (N/A, 11/17/2020); Colonoscopy (11/17/2020); and Hemorrhoid surgery (N/A, 11/19/2020). Social History:   reports that she has been smoking cigarettes. She has a 25.00 pack-year smoking history. She has never used smokeless tobacco. She reports that she does not drink alcohol and does not use drugs. Family History:  family history includes Heart Disease in her father; High Blood Pressure in her mother; Stroke in her mother. Home Medications:  Were reviewed and are listed in nursing record.  and/or listed below  Prior to Admission medications    Medication Sig Start Date End Date Taking?  Authorizing Provider   hydrOXYzine (VISTARIL) 25 MG capsule Take 25 mg by mouth 3 times daily as needed for Itching   Yes Historical Provider, MD   lisinopril (PRINIVIL;ZESTRIL) 40 MG tablet TAKE 1 TABLET BY MOUTH EVERY DAY 6/7/21  Yes Jannette Meneses MD   clopidogrel (PLAVIX) 75 MG tablet TAKE 1 TABLET DAILY 6/7/21  Yes Jannette Meneses MD   aspirin 81 MG chewable tablet Take 1 tablet by mouth daily   Yes Historical Provider, MD   pravastatin (PRAVACHOL) 80 MG tablet Take 1 tablet by mouth daily 12/16/20  Yes ROMEO Cui CNP   famotidine (PEPCID) 20 MG tablet Take 1 tablet by mouth 2 times daily 6/22/20  Yes ROMEO Cui CNP   amLODIPine (NORVASC) 10 MG tablet Take 5 mg by mouth   Yes Historical Provider, MD   nitroGLYCERIN (NITROSTAT) 0.4 MG SL tablet Place 1 tablet under the tongue every 5 minutes as needed for Chest pain 6/25/21   Jannette Meneses MD   carvedilol (COREG) 25 MG tablet TAKE 1 TABLET TWICE A DAY WITH MEALS 6/7/21   Jannette Meneses MD        Current Medications:  Current Facility-Administered Medications   Medication Dose Route Frequency Provider Last Rate Last Admin    pantoprazole (PROTONIX) tablet 40 mg  40 mg Oral QAM AC ROMEO Marks CNP        melatonin tablet 9 mg  9 mg Oral Nightly PRN Micaela Miguel PA-C   9 mg at 07/05/21 0010    budesonide-formoterol (SYMBICORT) 160-4.5 MCG/ACT inhaler 2 puff  2 puff Inhalation BID Jamarcus Gold MD        ipratropium-albuterol (DUONEB) nebulizer solution 1 ampule  1 ampule Inhalation BID Jamarcus Gold MD        aspirin chewable tablet 81 mg  81 mg Oral Daily Yuly Bauer MD   81 mg at 07/06/21 0855    nitroGLYCERIN (NITROSTAT) SL tablet 0.4 mg  0.4 mg Sublingual Q5 Min PRN Osman Hamilton MD        amLODIPine (NORVASC) tablet 5 mg  5 mg Oral Nightly Osman Hamilton MD   5 mg at 07/05/21 2100    clopidogrel (PLAVIX) tablet 75 mg  75 mg Oral Daily Osman Hamilton MD   75 mg at 07/06/21 0855    pravastatin (PRAVACHOL) tablet 80 mg  80 mg Oral Daily Oniel Horne MD   80 mg at 07/06/21 0856    carvedilol (COREG) tablet 25 mg  25 mg Oral BID WC Oniel Horne MD   25 mg at 07/05/21 1634    sodium chloride flush 0.9 % injection 5-40 mL  5-40 mL Intravenous 2 times per day Oniel Horne MD   10 mL at 07/06/21 0856    sodium chloride flush 0.9 % injection 5-40 mL  5-40 mL Intravenous PRN Oniel Horne MD        0.9 % sodium chloride infusion  25 mL Intravenous PRN Oniel Horne MD        enoxaparin (LOVENOX) injection 40 mg  40 mg Subcutaneous Daily Oniel Horne MD   40 mg at 07/05/21 0816    ondansetron (ZOFRAN-ODT) disintegrating tablet 4 mg  4 mg Oral Q8H PRN Oniel Horne MD        Or    ondansetron (ZOFRAN) injection 4 mg  4 mg Intravenous Q6H PRN Oniel Horne MD        polyethylene glycol (GLYCOLAX) packet 17 g  17 g Oral Daily PRN Oniel Horne MD        acetaminophen (TYLENOL) tablet 650 mg  650 mg Oral Q6H PRN Oniel Horne MD   650 mg at 07/05/21 1634    Or    acetaminophen (TYLENOL) suppository 650 mg  650 mg Rectal Q6H PRN Oniel Horne MD        lisinopril (PRINIVIL;ZESTRIL) tablet 40 mg  40 mg Oral Daily Oniel Horne MD   40 mg at 07/06/21 5894        Allergies:  Patient has no known allergies. Review of Systems:     · Constitutional: there has been no unanticipated weight loss. There's been no change in energy level, sleep pattern, or activity level. · Eyes: No visual changes or diplopia. No scleral icterus. · ENT: No Headaches, hearing loss or vertigo. No mouth sores or sore throat. · Cardiovascular: Reviewed in HPI  · Respiratory: No cough or wheezing, no sputum production. No hematemesis. · Gastrointestinal: No abdominal pain, appetite loss, blood in stools. No change in bowel or bladder habits. · Genitourinary: No dysuria, trouble voiding, or hematuria. · Musculoskeletal:  No gait disturbance, weakness or joint complaints.   · Integumentary: No rash or pruritis. · Neurological: No headache, diplopia, change in muscle strength, numbness or tingling. No change in gait, balance, coordination, mood, affect, memory, mentation, behavior. · Psychiatric: No anxiety, no depression. · Endocrine: No malaise, fatigue or temperature intolerance. No excessive thirst, fluid intake, or urination. No tremor. · Hematologic/Lymphatic: No abnormal bruising or bleeding, blood clots or swollen lymph nodes. · Allergic/Immunologic: No nasal congestion or hives.   ·     Physical Examination:    Vitals:    07/06/21 0800   BP: 121/85   Pulse: 62   Resp: 16   Temp: 98 °F (36.7 °C)   SpO2: 94%    Weight: 144 lb 6.4 oz (65.5 kg)         General Appearance:  Alert, cooperative, no distress, appears stated age   Head:  Normocephalic, without obvious abnormality, atraumatic   Eyes:  PERRL, conjunctiva/corneas clear       Nose: Nares normal, no drainage or sinus tenderness   Throat: Lips, mucosa, and tongue normal   Neck: Supple, symmetrical, trachea midline, no adenopathy, thyroid: not enlarged, symmetric, no tenderness/mass/nodules, no carotid bruit or JVD       Lungs:   Clear to auscultation bilaterally, respirations unlabored   Chest Wall:  No tenderness or deformity   Heart:  Regular rate and rhythm, S1, S2 normal, no murmur, rub or gallop   Abdomen:   Soft, non-tender, bowel sounds active all four quadrants,  no masses, no organomegaly           Extremities: Extremities normal, atraumatic, no cyanosis or edema   Pulses: 2+ and symmetric   Skin: Skin color, texture, turgor normal, no rashes or lesions   Pysch: Normal mood and affect   Neurologic: Normal gross motor and sensory exam.         Labs  CBC:   Lab Results   Component Value Date    WBC 7.3 07/04/2021    RBC 4.89 07/04/2021    HGB 14.2 07/04/2021    HCT 43.5 07/04/2021    MCV 89.0 07/04/2021    RDW 13.5 07/04/2021     07/04/2021     CMP:    Lab Results   Component Value Date     07/04/2021    K 4.2 07/04/2021  07/04/2021    CO2 24 07/04/2021    BUN 8 07/04/2021    CREATININE 0.6 07/04/2021    GFRAA >60 07/04/2021    GFRAA >60 09/04/2010    AGRATIO 1.4 07/04/2021    LABGLOM >60 07/04/2021    GLUCOSE 117 07/04/2021    PROT 6.9 07/04/2021    CALCIUM 9.2 07/04/2021    BILITOT 0.5 07/04/2021    ALKPHOS 96 07/04/2021    AST 17 07/04/2021    ALT 9 07/04/2021     PT/INR:  No results found for: PTINR  Lab Results   Component Value Date    CKMB 1.45 09/03/2010    TROPONINI <0.01 07/04/2021       EKG:  I have reviewed EKG with the following interpretation:  Impression:  Normal sinus rhythmRight bundle branch blockSeptal infarct     Pt has CAD with following history:  CABG: (date/details),   Valve replacement (date/details)   GXT/Myoview/Lexiscan: (date)  (results)    -There is a large, severe intensity, fixed anterior, apical and septal    defect c/w scar.    -There is no ischemia.    -There is anterior, septal and apical hypokinesis with EF=38%.  -Intermediate risk study.    -Similar to 2019 study. Stress/echo: (date) (result)   CATH/PCI:  (date)- (results)  - (# and type of stents) -   ECHO: (date) results EF    %. DAVID (date) (results)  EP procedures/studies/ablation:  (specific data). Device information:  Event monitor: (date/results)    Stress Jan '19  Summary  There is a moderate-large anterior, septal and apical fixed defect c/w scar.  There is no significant ischemia.  There is anterior, septal and apical hypokinesis with EF=40%  Intermediate risk.  Similar to 2014 study      Echo~ April '18  Summary  Left ventricular function is reduced with ejection fraction estimated at 40%. There is severe hypokinesis of the apical anterioseptal wall. Diastolic filling parameters suggest grade I diastolic dysfunction with normal filling pressures. E/e''=15.5  The mitral valve is normal in structure . Mild mitral regurgitation is present. Mild aortic regurgitation is present.     All testing and labs listed below were personally reviewed. Assessment  Patient Active Problem List   Diagnosis    CAD (coronary artery disease)    HTN (hypertension)    Hyperlipemia    Cardiomyopathy ([facility] [address]    Tobacco abuse    Chest pain         Plan:    I had the opportunity to review the clinical symptoms and presentation of Siddhartha Fine. Assessment/Plan:  Active Problems:    Chest pain  Plan: Unstable angina. Normal troponin. No ACS. Abnormal stress test but no evidence of ischemia. Difficult to assess for ischemia. Cont aspirin, statin, plavix. Based on these findings I recommend left heart cath for definitive evaluation of coronary arteries. Risks, benefits, expectations, and alternative treatments were discussed. Questions appropriately answered. Siddhartha Fine agrees to proceed and verbalized understanding. Cardiomyopathy: ef 25% to 45%. Low lvef by stress. Check echo. Cont lisinopril, coreg. I will address the patient's cardiac risk factors and adjusted pharmacologic treatment as needed. In addition, I have reinforced the need for patient directed risk factor modification. Tobacco use was discussed with the patient and educated on the negative effects. I have asked the patient to not utilize these agents. Thank you for allowing to us to participate in the care or Siddhartha Fine. Further evaluation will be based upon the patient's clinical course and testing results. All questions and concerns were addressed to the patient/family. Alternatives to my treatment were discussed. The note was completed using EMR. Every effort was made to ensure accuracy; however, inadvertent computerized transcription errors may be present.     Hao Pedroza MD, MD 7/6/2021 9:34 AM

## 2021-07-06 NOTE — PROGRESS NOTES
Hospitalist Progress Note      PCP: Khadra Bear    Date of Admission: 7/4/2021    Chief Complaint: chest pain and tightness    Hospital Course: This is 69-year-old female with a history of coronary artery disease status post stent to LAD in 2010, hypertension, cardiomyopathy, tobacco abuse admitted with a chest pain and tightness patient is followed by cardiologist Dr. Dariana Joiner: Patient denies any chest pain now no shortness of breath no nausea vomiting she is smoking half pack per day since age 14    Assessment/Plan:    Unstable angina  -cardiology consulted, stress test performed. Stress test abnormal but cannot rule out ischemia. Cardiac cath ordered for tomorrow. Cardiomyopathy  -EF 25 to 45% per stress test.  Echo ordered. Continue lisinopril Coreg    GERD  -Patient reports increasing symptoms since eating tomatoes onions sandwich. Takes Pepcid, will change to Protonix for short trial.  If symptoms were to persist, recommend GI outpatient follow-up.     Possible COPD  -Nebulizer and Symbicort    Past medical history hypertension, hyperlipidemia  -Continue home medications    Tobacco abuse  -Encourage smoking cessation    Active Hospital Problems    Diagnosis     Chest pain [R07.9]     CAD (coronary artery disease) [I25.10]     Ischemic cardiomyopathy [I25.5]        Medications:  Reviewed    Infusion Medications    sodium chloride       Scheduled Medications    pantoprazole  40 mg Oral QAM AC    budesonide-formoterol  2 puff Inhalation BID    ipratropium-albuterol  1 ampule Inhalation BID    aspirin  81 mg Oral Daily    amLODIPine  5 mg Oral Nightly    clopidogrel  75 mg Oral Daily    pravastatin  80 mg Oral Daily    carvedilol  25 mg Oral BID WC    sodium chloride flush  5-40 mL Intravenous 2 times per day    enoxaparin  40 mg Subcutaneous Daily    lisinopril  40 mg Oral Daily     PRN Meds: melatonin, nitroGLYCERIN, sodium chloride flush, sodium chloride, ondansetron **OR** ondansetron, polyethylene glycol, acetaminophen **OR** acetaminophen    No intake or output data in the 24 hours ending 07/06/21 2742    Physical Exam Performed:    BP (!) 145/70   Pulse 68   Temp 97.7 °F (36.5 °C) (Oral)   Resp 16   Ht 5' 1\" (1.549 m)   Wt 144 lb 6.4 oz (65.5 kg)   SpO2 95%   BMI 27.28 kg/m²     General appearance: No apparent distress, appears stated age and cooperative. HEENT: Pupils equal, round, and reactive to light. Conjunctivae/corneas clear. Neck: Supple, with full range of motion. No jugular venous distention. Trachea midline. Respiratory:  Normal respiratory effort. Clear to auscultation, bilaterally without Rales/Wheezes/Rhonchi. Cardiovascular: Regular rate and rhythm with normal S1/S2 without murmurs, rubs or gallops. Abdomen: Soft, non-tender, non-distended with normal bowel sounds. Musculoskeletal: No clubbing, cyanosis or edema bilaterally. Full range of motion without deformity. Midsternal chest pain reproducible with palpation  Skin: Skin color, texture, turgor normal.  No rashes or lesions. Neurologic:  Neurovascularly intact without any focal sensory/motor deficits.  Cranial nerves: II-XII intact, grossly non-focal.  Psychiatric: Alert and oriented, thought content appropriate, normal insight  Capillary Refill: Brisk,< 3 seconds   Peripheral Pulses: +2 palpable, equal bilaterally       Labs:   Recent Labs     07/04/21  1527   WBC 7.3   HGB 14.2   HCT 43.5        Recent Labs     07/04/21  1527   *   K 4.2      CO2 24   BUN 8   CREATININE 0.6   CALCIUM 9.2     Recent Labs     07/04/21  1527   AST 17   ALT 9*   BILITOT 0.5   ALKPHOS 96     Recent Labs     07/04/21  1527   INR 0.97     Recent Labs     07/04/21  1527 07/04/21  1816 07/04/21  2203   TROPONINI <0.01 <0.01 <0.01       Urinalysis:      Lab Results   Component Value Date    NITRU Negative 04/07/2021    WBCUA 1 04/07/2021    RBCUA 5 04/07/2021    BLOODU SMALL 04/07/2021    SPECGRAV 1.005 04/07/2021    GLUCOSEU Negative 04/07/2021       Radiology:  NM MYOCARDIAL SPECT REST EXERCISE OR RX   Final Result      XR CHEST PORTABLE   Final Result   No acute disease                 DVT Prophylaxis: Lovenox  Diet: ADULT DIET; Regular; Low Fat/Low Chol/High Fiber/TRISTAN  Diet NPO Exceptions are: Sips of Water with Meds  Code Status: Full Code    PT/OT Eval Status: No acute needs    Dispo -home once medically stable    Lisandra Martinez, APRN - CNP      NOTE:  This report was transcribed using voice recognition software. Every effort was made to ensure accuracy; however, inadvertent computerized transcription errors may be present.

## 2021-07-07 VITALS
OXYGEN SATURATION: 93 % | DIASTOLIC BLOOD PRESSURE: 60 MMHG | RESPIRATION RATE: 16 BRPM | WEIGHT: 147.3 LBS | HEART RATE: 71 BPM | BODY MASS INDEX: 27.81 KG/M2 | SYSTOLIC BLOOD PRESSURE: 103 MMHG | TEMPERATURE: 97.9 F | HEIGHT: 61 IN

## 2021-07-07 LAB
LEFT VENTRICULAR EJECTION FRACTION HIGH VALUE: 30 %
LEFT VENTRICULAR EJECTION FRACTION MODE: NORMAL
LV EF: 53 %
LVEF MODALITY: NORMAL

## 2021-07-07 PROCEDURE — 99024 POSTOP FOLLOW-UP VISIT: CPT | Performed by: INTERNAL MEDICINE

## 2021-07-07 PROCEDURE — 99152 MOD SED SAME PHYS/QHP 5/>YRS: CPT

## 2021-07-07 PROCEDURE — 2580000003 HC RX 258: Performed by: FAMILY MEDICINE

## 2021-07-07 PROCEDURE — 6360000002 HC RX W HCPCS

## 2021-07-07 PROCEDURE — 6360000004 HC RX CONTRAST MEDICATION: Performed by: INTERNAL MEDICINE

## 2021-07-07 PROCEDURE — 99153 MOD SED SAME PHYS/QHP EA: CPT

## 2021-07-07 PROCEDURE — 6370000000 HC RX 637 (ALT 250 FOR IP): Performed by: FAMILY MEDICINE

## 2021-07-07 PROCEDURE — 93458 L HRT ARTERY/VENTRICLE ANGIO: CPT | Performed by: INTERNAL MEDICINE

## 2021-07-07 PROCEDURE — 93306 TTE W/DOPPLER COMPLETE: CPT

## 2021-07-07 PROCEDURE — 2580000003 HC RX 258

## 2021-07-07 PROCEDURE — 2580000003 HC RX 258: Performed by: INTERNAL MEDICINE

## 2021-07-07 PROCEDURE — 4A023N7 MEASUREMENT OF CARDIAC SAMPLING AND PRESSURE, LEFT HEART, PERCUTANEOUS APPROACH: ICD-10-PCS | Performed by: INTERNAL MEDICINE

## 2021-07-07 PROCEDURE — 2709999900 HC NON-CHARGEABLE SUPPLY

## 2021-07-07 PROCEDURE — 99152 MOD SED SAME PHYS/QHP 5/>YRS: CPT | Performed by: INTERNAL MEDICINE

## 2021-07-07 PROCEDURE — 6370000000 HC RX 637 (ALT 250 FOR IP): Performed by: NURSE PRACTITIONER

## 2021-07-07 PROCEDURE — 6370000000 HC RX 637 (ALT 250 FOR IP): Performed by: INTERNAL MEDICINE

## 2021-07-07 PROCEDURE — C1894 INTRO/SHEATH, NON-LASER: HCPCS

## 2021-07-07 PROCEDURE — C1769 GUIDE WIRE: HCPCS

## 2021-07-07 PROCEDURE — 93458 L HRT ARTERY/VENTRICLE ANGIO: CPT

## 2021-07-07 PROCEDURE — 2500000003 HC RX 250 WO HCPCS

## 2021-07-07 RX ORDER — SODIUM CHLORIDE 9 MG/ML
INJECTION, SOLUTION INTRAVENOUS CONTINUOUS
Status: DISCONTINUED | OUTPATIENT
Start: 2021-07-07 | End: 2021-07-07 | Stop reason: HOSPADM

## 2021-07-07 RX ORDER — OXYCODONE HYDROCHLORIDE AND ACETAMINOPHEN 5; 325 MG/1; MG/1
2 TABLET ORAL EVERY 4 HOURS PRN
Status: DISCONTINUED | OUTPATIENT
Start: 2021-07-07 | End: 2021-07-07 | Stop reason: HOSPADM

## 2021-07-07 RX ORDER — SODIUM CHLORIDE 0.9 % (FLUSH) 0.9 %
5-40 SYRINGE (ML) INJECTION PRN
Status: DISCONTINUED | OUTPATIENT
Start: 2021-07-07 | End: 2021-07-07 | Stop reason: HOSPADM

## 2021-07-07 RX ORDER — ACETAMINOPHEN 325 MG/1
650 TABLET ORAL EVERY 4 HOURS PRN
Status: DISCONTINUED | OUTPATIENT
Start: 2021-07-07 | End: 2021-07-07 | Stop reason: HOSPADM

## 2021-07-07 RX ORDER — SODIUM CHLORIDE 0.9 % (FLUSH) 0.9 %
5-40 SYRINGE (ML) INJECTION EVERY 12 HOURS SCHEDULED
Status: DISCONTINUED | OUTPATIENT
Start: 2021-07-07 | End: 2021-07-07 | Stop reason: HOSPADM

## 2021-07-07 RX ORDER — ISOSORBIDE MONONITRATE 30 MG/1
30 TABLET, EXTENDED RELEASE ORAL DAILY
Status: DISCONTINUED | OUTPATIENT
Start: 2021-07-07 | End: 2021-07-07 | Stop reason: HOSPADM

## 2021-07-07 RX ORDER — SODIUM CHLORIDE 9 MG/ML
25 INJECTION, SOLUTION INTRAVENOUS PRN
Status: DISCONTINUED | OUTPATIENT
Start: 2021-07-07 | End: 2021-07-07 | Stop reason: HOSPADM

## 2021-07-07 RX ORDER — ISOSORBIDE MONONITRATE 30 MG/1
30 TABLET, EXTENDED RELEASE ORAL DAILY
Qty: 30 TABLET | Refills: 3 | Status: SHIPPED | OUTPATIENT
Start: 2021-07-07 | End: 2021-08-18

## 2021-07-07 RX ORDER — OXYCODONE HYDROCHLORIDE AND ACETAMINOPHEN 5; 325 MG/1; MG/1
1 TABLET ORAL EVERY 4 HOURS PRN
Status: DISCONTINUED | OUTPATIENT
Start: 2021-07-07 | End: 2021-07-07 | Stop reason: HOSPADM

## 2021-07-07 RX ADMIN — PRAVASTATIN SODIUM 80 MG: 80 TABLET ORAL at 08:41

## 2021-07-07 RX ADMIN — CLOPIDOGREL BISULFATE 75 MG: 75 TABLET ORAL at 08:41

## 2021-07-07 RX ADMIN — ASPIRIN 81 MG: 81 TABLET, CHEWABLE ORAL at 08:41

## 2021-07-07 RX ADMIN — CARVEDILOL 25 MG: 25 TABLET, FILM COATED ORAL at 16:17

## 2021-07-07 RX ADMIN — SODIUM CHLORIDE: 9 INJECTION, SOLUTION INTRAVENOUS at 13:26

## 2021-07-07 RX ADMIN — IOPAMIDOL 53 ML: 755 INJECTION, SOLUTION INTRAVENOUS at 12:56

## 2021-07-07 RX ADMIN — LISINOPRIL 40 MG: 20 TABLET ORAL at 08:41

## 2021-07-07 RX ADMIN — PANTOPRAZOLE SODIUM 40 MG: 40 TABLET, DELAYED RELEASE ORAL at 05:16

## 2021-07-07 RX ADMIN — CARVEDILOL 25 MG: 25 TABLET, FILM COATED ORAL at 08:41

## 2021-07-07 RX ADMIN — Medication 10 ML: at 08:43

## 2021-07-07 RX ADMIN — ISOSORBIDE MONONITRATE 30 MG: 30 TABLET, EXTENDED RELEASE ORAL at 16:17

## 2021-07-07 ASSESSMENT — PAIN SCALES - GENERAL
PAINLEVEL_OUTOF10: 0
PAINLEVEL_OUTOF10: 0

## 2021-07-07 NOTE — OP NOTE
Patient:  [place]etta [place]   :   1950    Procedural Summary  ~Consent:   Obtained written and verbal consent      Risks/benefits explained in detail  ~Procedure:    Left Heart Catheterization  ~Medications:    Procedural sedation with minimal conscious sedation  ~Complications:   None  ~Blood Loss:    <10cc  ~Specimens:    None obtained  ~Pre-sedation re-evaluation: Performed immediately prior to procedure. Medication and Procedural Reconciliation:  An independent trained observer pushed medications at my direction. We monitored the patient's level of consciousness and vital signs/physiologic status throughout the procedure duration (see start and stop times below). Sedation: 1.5 mg Versed, 75 mcg Fentanyl  Sedation start: 1202  Sedation stop: 1233    Cardiac Cath lvg:  Anatomy:   LM-nml   LAD-mid stent 10% ISR  Cx-nml  OM- nml  RCA-prox 100%   RPDA- L to R collaterals  LVEF- 30  LVG- severe anterior, anteroapical, apical hypokinesis  LVEDP- 17    Contrast: 53  Flouro Time: 1.7  Access: R radial a    Impression  ~Coronary Angiography w/ severe single vessel CAD, patent stent  ~LVG with LVEF of 30 and LAD regional wall motion abnormalities  ~Severe branch CAD involving the diagonal, small vessel that is difficult to treat with PCI. Opt for medical therapy. If symptoms persists consider PCI to D2. Recommendation  ~Aggressive medical treatment and risk factor modification  ~1. Medications reviewed, no changes at this time. 2. Stop heparin gtt. Post cath IVF. Bedrest.  4. Patient has been advised on the importance of regular exercise of at least 20-30 minutes daily alternating with aerobic and isometric activities. 5. Patient counseled about and offered assistance for smoking cessation   6. No indication for cardiac rehab  7. OK to discharge home.  Follow up in 2-3 months with cardiology          Domo Hyman MD, MD 2021 1:33 PM

## 2021-07-07 NOTE — DISCHARGE SUMMARY
Hospital Medicine Discharge Summary    Patient ID: Etta Avendano      Patient's PCP: Kaitlyn Sena Date: 7/4/2021     Discharge Date:   7/7/2021    Admitting Physician: Killian Mullins MD     Discharge Physician: ROMEO Chahal - CNP     Discharge Diagnoses  Unstable angina  Cardiomyopathy  GERD  Possible COPD  Past medical history hypertension, hyperlipidemia, tobacco abuse    Hospital Course: This is 26-year-old female with a history of coronary artery disease status post stent to LAD in 2010, hypertension, cardiomyopathy, tobacco abuse admitted with a chest pain and tightness patient is followed by cardiologist Dr. Clarice Gonzales    Unstable angina  -cardiology consulted, stress test performed. Stress test abnormal, left heart catheterization performed 7/7/2021 with Dr. Clarice Gonzales. Coronary angiography with severe single-vessel CAD, patent stent. LV G with LVEF of 30 and LAD regional wall motion abnormalities. Successful complex angioplasty and stenting of LAD. Severe branch CAD involving the diagonal, small vessel that is difficult to treat with PCI. Offer medical therapy. If symptoms persist consider PCI to D2  -Cardiology recommended aggressive medical treatment, no change to home medications, encourage regular exercise, smoking cessation, follow-up with cardiology in 2 to 3 months. Cardiomyopathy  -EF 25 to 45% per stress test.  Echo showed EF of 50% to 55%. Heart catheterization showed EF of 30%. .  Continue lisinopril Coreg     GERD  -Patient reports increasing symptoms since eating tomatoes onions sandwich. Takes Pepcid, will change to Protonix for short trial.  If symptoms were to persist, recommend GI outpatient follow-up.     Possible COPD  -Nebulizer and Symbicort     Past medical history hypertension, hyperlipidemia  -Continue home medications     Tobacco abuse  -Encourage smoking cessation         Patient stated they felt well and wanted to go home.   Patient denied chest pain, shortness of breath, palpitations, abdominal pain, nausea vomiting, diarrhea, dysuria, headache lightheadedness or dizziness. Appetite good. Voiding without difficulty. Reviewed plan of care with patient, verbalized understanding and agreement. Denied further questions or needs. Physical Exam Performed:     /60   Pulse 71   Temp 97.9 °F (36.6 °C) (Oral)   Resp 16   Ht 5' 1\" (1.549 m)   Wt 147 lb 4.8 oz (66.8 kg)   SpO2 93%   BMI 27.83 kg/m²       General appearance:  No apparent distress, appears stated age and cooperative. HEENT:  Normal cephalic, atraumatic without obvious deformity. Pupils equal, round, and reactive to light. Extra ocular muscles intact. Conjunctivae/corneas clear. Neck: Supple, with full range of motion. No jugular venous distention. Trachea midline. Respiratory:  Normal respiratory effort. Clear to auscultation, bilaterally without Rales/Wheezes/Rhonchi. Cardiovascular:  Regular rate and rhythm with normal S1/S2 without murmurs, rubs or gallops. Abdomen: Soft, non-tender, non-distended with normal bowel sounds. Musculoskeletal:  No clubbing, cyanosis or edema bilaterally. Full range of motion without deformity. Skin: Skin color, texture, turgor normal.  No rashes or lesions. Neurologic:  Neurovascularly intact without any focal sensory/motor deficits. Cranial nerves: II-XII intact, grossly non-focal.  Psychiatric:  Alert and oriented, thought content appropriate, normal insight  Capillary Refill: Brisk,< 3 seconds   Peripheral Pulses: +2 palpable, equal bilaterally       Labs:  For convenience and continuity at follow-up the following most recent labs are provided:      CBC:    Lab Results   Component Value Date    WBC 7.3 07/04/2021    HGB 14.2 07/04/2021    HCT 43.5 07/04/2021     07/04/2021       Renal:    Lab Results   Component Value Date     07/04/2021    K 4.2 07/04/2021     07/04/2021    CO2 24 07/04/2021    BUN 8 07/04/2021 CREATININE 0.6 07/04/2021    CALCIUM 9.2 07/04/2021         Significant Diagnostic Studies    Radiology:   NM MYOCARDIAL SPECT REST EXERCISE OR RX   Final Result      XR CHEST PORTABLE   Final Result   No acute disease                Consults:     IP CONSULT TO CARDIOLOGY    Disposition: Home    Condition at Discharge: Stable    Discharge Instructions/Follow-up:      Take protonix daily for 30 days, then resume pepcid as previously prescribed.  If further GERD symptoms return, discuss with PCP, may need GI consult at that point  Follow up with pcp in 1 week  Follow up with cardiology in 2-3 months  Stop smoking     Code Status:  Full Code     Activity: activity as tolerated    Diet: cardiac diet      Discharge Medications:     Discharge Medication List as of 7/7/2021  5:34 PM           Details   isosorbide mononitrate (IMDUR) 30 MG extended release tablet Take 1 tablet by mouth daily, Disp-30 tablet, R-3Normal      pantoprazole (PROTONIX) 40 MG tablet Take 1 tablet by mouth every morning (before breakfast), Disp-30 tablet, R-3Normal              Details   hydrOXYzine (VISTARIL) 25 MG capsule Take 25 mg by mouth 3 times daily as needed for ItchingHistorical Med      lisinopril (PRINIVIL;ZESTRIL) 40 MG tablet TAKE 1 TABLET BY MOUTH EVERY DAY, Disp-90 tablet, R-3Normal      clopidogrel (PLAVIX) 75 MG tablet TAKE 1 TABLET DAILY, Disp-90 tablet, R-3Normal      aspirin 81 MG chewable tablet Take 1 tablet by mouth dailyHistorical Med      pravastatin (PRAVACHOL) 80 MG tablet Take 1 tablet by mouth daily, Disp-90 tablet, R-1Normal      amLODIPine (NORVASC) 10 MG tablet Take 5 mg by mouthHistorical Med      nitroGLYCERIN (NITROSTAT) 0.4 MG SL tablet Place 1 tablet under the tongue every 5 minutes as needed for Chest pain, Disp-25 tablet, R-1Normal      carvedilol (COREG) 25 MG tablet TAKE 1 TABLET TWICE A DAY WITH MEALS, Disp-180 tablet, R-3Normal             Time Spent on discharge is more than 30 minutes in the examination, evaluation, counseling and review of medications and discharge plan. Signed: [address], ROMEO - CNP   7/7/2021    The patient was seen and examined on day of discharge and this discharge summary is in conjunction with any daily progress note from day of discharge. Thank you Gisselle Kumar for the opportunity to be involved in this patient's care. If you have any questions or concerns please feel free to contact me at [phone]. NOTE:  This report was transcribed using voice recognition software. Every effort was made to ensure accuracy; however, inadvertent computerized transcription errors may be present.

## 2021-07-07 NOTE — PROGRESS NOTES
Data- discharge order received, pt verbalized agreement to discharge, disposition to previous residence, no needs for HHC/DME. Action- discharge instructions prepared and given to patient, pt verbalized understanding. Medication information packet given r/t NEW and/or CHANGED prescriptions emphasizing name/purpose/side effects, pt verbalized understanding. Discharge instruction summary: Diet- cardiac, Activity- as tolerated, Primary Care Physician as Joe Elissadevon [phone] f/u appointment within 1 week, immunizations reviewed and updated, prescription medications filled by patient. Inpatient surgical procedure precautions reviewed: left heart cath. 1. WEIGHT: Admit Weight: 144 lb 6.4 oz (65.5 kg) (07/04/21 1522)        Today  Weight: 147 lb 4.8 oz (66.8 kg) (07/07/21 0430)       2. O2 SAT.: SpO2: 93 % (07/07/21 1617)    Response- Pt belongings gathered, IV removed. Disposition is home (no HHC/DME needs), transported with niece, taken to lobby via w/c w/ RN, no complications.

## 2021-07-07 NOTE — PLAN OF CARE
Problem: Pain:  Goal: Pain level will decrease  Description: Pain level will decrease  7/7/2021 0151 by Genoveva Resendiz RN  Outcome: Ongoing  7/6/2021 1835 by Lavelle Barroso RN  Outcome: Ongoing  Goal: Control of acute pain  Description: Control of acute pain  Outcome: Ongoing  Goal: Control of chronic pain  Description: Control of chronic pain  Outcome: Ongoing     Problem: Falls - Risk of:  Goal: Will remain free from falls  Description: Will remain free from falls  7/7/2021 0151 by Genoveva Resendiz RN  Outcome: Ongoing  7/6/2021 1835 by Lavelle Barroso RN  Outcome: Ongoing  Goal: Absence of physical injury  Description: Absence of physical injury  Outcome: Ongoing

## 2021-08-17 ENCOUNTER — OFFICE VISIT (OUTPATIENT)
Dept: CARDIOLOGY CLINIC | Age: 71
End: 2021-08-17
Payer: MEDICARE

## 2021-08-17 VITALS
SYSTOLIC BLOOD PRESSURE: 118 MMHG | DIASTOLIC BLOOD PRESSURE: 60 MMHG | OXYGEN SATURATION: 95 % | HEIGHT: 61 IN | BODY MASS INDEX: 27.9 KG/M2 | HEART RATE: 73 BPM | WEIGHT: 147.8 LBS

## 2021-08-17 DIAGNOSIS — I10 ESSENTIAL HYPERTENSION: ICD-10-CM

## 2021-08-17 DIAGNOSIS — I25.10 CORONARY ARTERY DISEASE INVOLVING NATIVE CORONARY ARTERY OF NATIVE HEART WITHOUT ANGINA PECTORIS: Primary | ICD-10-CM

## 2021-08-17 DIAGNOSIS — E78.2 MIXED HYPERLIPIDEMIA: ICD-10-CM

## 2021-08-17 DIAGNOSIS — I25.5 ISCHEMIC CARDIOMYOPATHY: ICD-10-CM

## 2021-08-17 PROCEDURE — 4040F PNEUMOC VAC/ADMIN/RCVD: CPT | Performed by: NURSE PRACTITIONER

## 2021-08-17 PROCEDURE — 1123F ACP DISCUSS/DSCN MKR DOCD: CPT | Performed by: NURSE PRACTITIONER

## 2021-08-17 PROCEDURE — G8417 CALC BMI ABV UP PARAM F/U: HCPCS | Performed by: NURSE PRACTITIONER

## 2021-08-17 PROCEDURE — 99214 OFFICE O/P EST MOD 30 MIN: CPT | Performed by: NURSE PRACTITIONER

## 2021-08-17 PROCEDURE — G8400 PT W/DXA NO RESULTS DOC: HCPCS | Performed by: NURSE PRACTITIONER

## 2021-08-17 PROCEDURE — 1090F PRES/ABSN URINE INCON ASSESS: CPT | Performed by: NURSE PRACTITIONER

## 2021-08-17 PROCEDURE — 3017F COLORECTAL CA SCREEN DOC REV: CPT | Performed by: NURSE PRACTITIONER

## 2021-08-17 PROCEDURE — G8427 DOCREV CUR MEDS BY ELIG CLIN: HCPCS | Performed by: NURSE PRACTITIONER

## 2021-08-17 PROCEDURE — 4004F PT TOBACCO SCREEN RCVD TLK: CPT | Performed by: NURSE PRACTITIONER

## 2021-08-17 NOTE — PROGRESS NOTES
[address]   Cardiac Evaluation      Patient: Lesley   YOB: 1950     Chief Complaint   Patient presents with    Coronary Artery Disease     c/o cp and sob    Hypertension    Hyperlipidemia        Referring provider: Severino Cardenas    History of Present Illness:   Ms. Candice Dowling is here for follow up. Her history includes coronary disease with stent placed to proximal LAD 9/10, cardiomyopathy, hypertension, and hyperlipidemia. MUGA performed 12/10 revealed at EF of 37%. Echo 10/13 revealed EF 25-30%. She did have GXT 12/14 and EF was 44 % , ECHO 4/18 showed EF 40%, she has not had any exacerbations of CHF. She presented to the hospital 7/4/21 with chest pain and had abnormal GXT. She had PCI to LAD on 7/7/21, severe branch CAD involving the diagonal, small vessel that is difficult to treat with PCI. Opt for medical therapy. If symptoms persist, consider PCI to D2. Ms Candice Dowling continues to smoke 1 PPD. Today, Ms Candice Dowling states she has not had much relief since stent placed 7/7/21. Saturday night she couldn't go to sleep because it felt like needles in her heart/chest. She thinks it was similar pain she had prior to her stent. Also experiencing chest pressure with exertion and fatigue. She has shortness of breath with minimal activity; continues to smoke 4-5 cigarettes daily. Experiences symptoms roughly once a week, relieved with nitro. She feels like she is uninterested in anything. Past Medical History:   has a past medical history of Arthritis, CAD (coronary artery disease), Cardiomyopathy ([address] GERD (gastroesophageal reflux disease), Hypertension, IBS (irritable bowel syndrome), and Incontinence of urine. Surgical History:   has a past surgical history that includes Hysterectomy; Bladder surgery (2010); Colonoscopy; other surgical history (2010); Coronary angioplasty with stent; bladder suspension; other surgical history; Colonoscopy (N/A, 11/17/2020);  Colonoscopy (11/17/2020); and Hemorrhoid surgery (N/A, 11/19/2020). Current Outpatient Medications   Medication Sig Dispense Refill    isosorbide mononitrate (IMDUR) 30 MG extended release tablet Take 1 tablet by mouth daily 30 tablet 3    pantoprazole (PROTONIX) 40 MG tablet Take 1 tablet by mouth every morning (before breakfast) 30 tablet 3    hydrOXYzine (VISTARIL) 25 MG capsule Take 25 mg by mouth 3 times daily as needed for Itching      nitroGLYCERIN (NITROSTAT) 0.4 MG SL tablet Place 1 tablet under the tongue every 5 minutes as needed for Chest pain 25 tablet 1    lisinopril (PRINIVIL;ZESTRIL) 40 MG tablet TAKE 1 TABLET BY MOUTH EVERY DAY 90 tablet 3    clopidogrel (PLAVIX) 75 MG tablet TAKE 1 TABLET DAILY 90 tablet 3    carvedilol (COREG) 25 MG tablet TAKE 1 TABLET TWICE A DAY WITH MEALS 180 tablet 3    aspirin 81 MG chewable tablet Take 1 tablet by mouth daily      pravastatin (PRAVACHOL) 80 MG tablet Take 1 tablet by mouth daily 90 tablet 1    amLODIPine (NORVASC) 10 MG tablet Take 5 mg by mouth       No current facility-administered medications for this visit. Allergies:  Patient has no known allergies. Social History:  Social History     Socioeconomic History    Marital status:       Spouse name: Not on file    Number of children: Not on file    Years of education: Not on file    Highest education level: Not on file   Occupational History    Not on file   Tobacco Use    Smoking status: Current Every Day Smoker     Packs/day: 0.50     Years: 50.00     Pack years: 25.00     Types: Cigarettes    Smokeless tobacco: Never Used   Vaping Use    Vaping Use: Never used   Substance and Sexual Activity    Alcohol use: No    Drug use: No    Sexual activity: Not on file   Other Topics Concern    Not on file   Social History Narrative    Not on file     Social Determinants of Health     Financial Resource Strain:     Difficulty of Paying Living Expenses:    Food Insecurity:     Worried About Running Out of Food in the Last Year:     Asha of Food in the Last Year:    Transportation Needs:     Lack of Transportation (Medical):  Lack of Transportation (Non-Medical):    Physical Activity:     Days of Exercise per Week:     Minutes of Exercise per Session:    Stress:     Feeling of Stress :    Social Connections:     Frequency of Communication with Friends and Family:     Frequency of Social Gatherings with Friends and Family:     Attends Jainism Services:     Active Member of Clubs or Organizations:     Attends Club or Organization Meetings:     Marital Status:    Intimate Partner Violence:     Fear of Current or Ex-Partner:     Emotionally Abused:     Physically Abused:     Sexually Abused:        Family History:   Family History   Problem Relation Age of Onset    High Blood Pressure Mother     Stroke Mother     Heart Disease Father      Family history has been reviewed and not pertinent except as noted above. Review of Systems:   · Constitutional: there has been no unanticipated weight loss. Fatigue   · Eyes: No visual changes   · ENT: No Headaches, hearing loss or vertigo. No mouth sores or sore throat. · Cardiovascular: Reviewed in HPI  · Respiratory: No cough or wheezing, no sputum production. Exertional SOB  · Gastrointestinal: No abdominal pain, appetite loss, blood in stools. No change in bowel or bladder habits. · Genitourinary: No nocturia, dysuria, trouble voiding  · Musculoskeletal:  No gait disturbance, weakness or joint complaints. · Integumentary: No rash or pruritis. · Neurological: No headache, change in muscle strength, numbness or tingling. No change in gait, balance, coordination, mood, affect, memory, mentation, behavior. · Psychiatric: No anxiety or depression  · Endocrine: No malaise or fever  · Hematologic/Lymphatic: No abnormal bruising or bleeding, blood clots or swollen lymph nodes.   · Allergic/Immunologic: No nasal congestion or hives.    Physical Examination:    Vitals:    08/17/21 1321   BP: 118/60   Site: Left Upper Arm   Position: Sitting   Cuff Size: Medium Adult   Pulse: 73   SpO2: 95%   Weight: 147 lb 12.8 oz (67 kg)   Height: 5' 1\" (1.549 m)     Body mass index is 27.93 kg/m². Wt Readings from Last 3 Encounters:   08/17/21 147 lb 12.8 oz (67 kg)   07/07/21 147 lb 4.8 oz (66.8 kg)   06/25/21 146 lb (66.2 kg)      BP Readings from Last 3 Encounters:   08/17/21 118/60   07/07/21 103/60   06/25/21 138/72     Last echo 7/7/2021  Summary   -Normal left ventricle size, wall thickness and systolic function with an   estimated ejection fraction of 50- 55%. -The anterior wall appears akinetic.   -Indeterminate diastolic function. E/e' = 10.0.   -Aortic valve leaflets appear mildly thickened, but opens well. -Mild tricuspid regurgitation. RVSP = 15 mmHG. Last stress test 7/6/2021  Summary    -There is a large, severe intensity, fixed anterior, apical and septal    defect c/w scar.    -There is no ischemia.    -There is anterior, septal and apical hypokinesis with EF=38%.  -Intermediate risk study.    -Similar to 2019 study. Last cath 7/721  Anatomy:   LM-nml   LAD-mid stent 10% ISR  Cx-nml  OM- nml  RCA-prox 100%   RPDA- L to R collaterals  LVEF- 30  LVG- severe anterior, anteroapical, apical hypokinesis  LVEDP- 17     Contrast: 53  Flouro Time: 1.7  Access: R radial a     Impression  ~Coronary Angiography w/ severe single vessel CAD, patent stent  ~LVG with LVEF of 30 and LAD regional wall motion abnormalities  ~Successful complex angioplasty and stenting of LAD  ~Severe branch CAD involving the diagonal, small vessel that is difficult to treat with PCI. Opt for medical therapy. If symptoms persists consider PCI to D2. Last EKG 7/4/2021  Normal sinus rhythm  Right bundle branch block  Septal infarct , age undetermined     Physical Examination:    · CONSTITUTIONAL: Well developed, well nourished  · EYES: PERRLA.  No xanthelasma, sclera non icteric  · EARS,NOSE,MOUTH,THROAT:  Mucous membranes moist, normal hearing  · NECK: Supple, JVP normal, thyroid not enlarged. Carotids 2+ without bruits  · RESPIRATORY: Normal effort, no rales or rhonchi  · CARDIOVASCULAR: Normal PMI, regular rate and rhythm, no murmurs, rub or gallop. No edema. Radial pulses present and equal  · CHEST: No scar or masses  · ABDOMEN: Normal bowel sounds. No masses or tenderness. No bruit  · MUSCULOSKELETAL: No clubbing or cyanosis. Moves all extremities well. Normal gait  · SKIN:  Warm and dry. No rashes  · NEUROLOGIC: Cranial nerves intact. Alert and oriented  · PSYCHIATRIC: Calm affect. Appears to have normal judgement and insight    Assessment/Plan  1. Coronary artery disease   ~[facility] 7/7/21, severe branch CAD involving the diagonal, small vessel that is difficult to treat with PCI. Opt for medical therapy. If symptoms persist, consider PCI to D2.  ~Nuclear stress 1/2019: There is a moderate-large anterior, septal and apical fixed defect c/w scar. There is no significant ischemia. There is anterior, septal and apical hypokinesis with EF 40%. Similar to 2014 study. ~s/p PCI of proximal LAD in 2010  ~On plavix/ASA/BB/Statin   ~doesn't feel much relief after PCI 7/7/21, +fatigue/chest pressure/exerional SOB   2. Mixed yperlipidemia           ~Lipids (5/7/20): , HDL 57,  Trig 163           ~On Pravachol 80 mg.            ~Being managed by her PCP  3. Essential hypertension           ~Controlled on Norvasc, lisinopril, and coreg            ~Does not check BP at home            ~BP today 118/60  4. Cardiomyopathy            ~Sappears compensated           ~MUGA 12/10: EF 37%           ~Echo 1/25/10: EF 20-25%, mild AR, decreased diastolic function           ~Echo 10/13: EF 25-30%. severe hypokinesis of the apical anteroseptal wall. Mild aortic regurgitation is present. Mild mitral regurgitation is present.  Mild tricuspid regurgitatio with RVSP 21 mmHg           ~MUGA 6/14 39%           ~ECHO 4/18 40%           ~GXT 1/2019 40%            ~Echo 7/7/21 50-55%            ~On Coreg/ACE    Plan  1. Increase imdur to 60mg daily. 2. I will talk to Dr. Selvin Cardenas and get back to you with a plan. If pain should worsen, go to ER. Thank you for allowing to me to participate in the care of Nathalia Scott.

## 2021-08-18 RX ORDER — ISOSORBIDE MONONITRATE 60 MG/1
60 TABLET, EXTENDED RELEASE ORAL DAILY
Qty: 30 TABLET | Refills: 2 | Status: SHIPPED | OUTPATIENT
Start: 2021-08-18 | End: 2021-08-19

## 2021-08-19 RX ORDER — ISOSORBIDE MONONITRATE 60 MG/1
60 TABLET, EXTENDED RELEASE ORAL DAILY
Qty: 90 TABLET | Refills: 3 | Status: ON HOLD | OUTPATIENT
Start: 2021-08-19 | End: 2022-02-01 | Stop reason: HOSPADM

## 2021-08-23 ENCOUNTER — TELEPHONE (OUTPATIENT)
Dept: CARDIOLOGY CLINIC | Age: 71
End: 2021-08-23

## 2021-08-23 DIAGNOSIS — I25.110 CORONARY ARTERY DISEASE INVOLVING NATIVE HEART WITH UNSTABLE ANGINA PECTORIS, UNSPECIFIED VESSEL OR LESION TYPE (HCC): Primary | ICD-10-CM

## 2021-08-23 NOTE — TELEPHONE ENCOUNTER
Spoke with Frankey Railing regarding her chest pain after ov with NPKA. LHC was offered by Dr Johnna Walker, however, Frankey Railing now states that her pain has resolved since increasing her isosorbide dose. She is feeling better and prefers to medically manage. She will call the office if her pain returns and we can discuss LHC at that time. She will keep her follow up appointments.

## 2021-10-13 RX ORDER — ISOSORBIDE MONONITRATE 60 MG/1
60 TABLET, EXTENDED RELEASE ORAL DAILY
Qty: 90 TABLET | Refills: 3 | OUTPATIENT
Start: 2021-10-13

## 2021-11-09 RX ORDER — ISOSORBIDE MONONITRATE 60 MG/1
60 TABLET, EXTENDED RELEASE ORAL DAILY
Qty: 30 TABLET | OUTPATIENT
Start: 2021-11-09

## 2021-11-09 NOTE — TELEPHONE ENCOUNTER
Spoke with [employer], they received the rx back in August for 90 with 3 refills, they stated patient was trying to fill from an old rx number. They will get this rx ready for her.

## 2021-11-15 RX ORDER — PANTOPRAZOLE SODIUM 40 MG/1
40 TABLET, DELAYED RELEASE ORAL
Qty: 30 TABLET | Refills: 3 | OUTPATIENT
Start: 2021-11-15

## 2021-11-15 NOTE — TELEPHONE ENCOUNTER
Received refill request for protonix from [address] [place] [address].     Last ov:6/25/2021 [facility],  8/17/2021 NPKA    Last Refill:7/7/2021 #30 with 3 refills    Next appointment:12/17/2021 [facility]

## 2021-11-15 NOTE — TELEPHONE ENCOUNTER
Spoke with patient notified her that she will need to get her refill through her PCP. Patient verbalized understanding.

## 2021-11-15 NOTE — TELEPHONE ENCOUNTER
Medication Refill    Medication needing refilled:  pantoprazole (PROTONIX) 40 MG      Dosage of the medication:    How are you taking this medication (QD, BID, TID, QID, PRN): 1 tablet by mouth every morning (before breakfast)    30 or 90 day supply called in: 90 day supply    When will you run out of your medication:    Which Pharmacy are we sending the medication to?:   [facility] [address] [facility] [phone] Melina Asif [phone]   [address] [address]   Phone:  [phone]  Fax:  [phone]

## 2022-02-01 ENCOUNTER — APPOINTMENT (OUTPATIENT)
Dept: GENERAL RADIOLOGY | Age: 72
End: 2022-02-01
Payer: MEDICARE

## 2022-02-01 ENCOUNTER — HOSPITAL ENCOUNTER (OUTPATIENT)
Age: 72
Setting detail: OBSERVATION
Discharge: HOME | End: 2022-02-01
Attending: EMERGENCY MEDICINE | Admitting: INTERNAL MEDICINE
Payer: MEDICARE

## 2022-02-01 VITALS
DIASTOLIC BLOOD PRESSURE: 67 MMHG | SYSTOLIC BLOOD PRESSURE: 122 MMHG | WEIGHT: 152.7 LBS | BODY MASS INDEX: 28.83 KG/M2 | HEIGHT: 61 IN | RESPIRATION RATE: 16 BRPM | HEART RATE: 86 BPM | OXYGEN SATURATION: 94 % | TEMPERATURE: 98.2 F

## 2022-02-01 DIAGNOSIS — I20.0 UNSTABLE ANGINA (HCC): Primary | ICD-10-CM

## 2022-02-01 LAB
A/G RATIO: 1.5 (ref 1.1–2.2)
ALBUMIN SERPL-MCNC: 4.2 G/DL (ref 3.4–5)
ALP BLD-CCNC: 82 U/L (ref 40–129)
ALT SERPL-CCNC: 8 U/L (ref 10–40)
ANION GAP SERPL CALCULATED.3IONS-SCNC: 9 MMOL/L (ref 3–16)
AST SERPL-CCNC: 13 U/L (ref 15–37)
BASOPHILS ABSOLUTE: 0 K/UL (ref 0–0.2)
BASOPHILS RELATIVE PERCENT: 0.7 %
BILIRUB SERPL-MCNC: 0.3 MG/DL (ref 0–1)
BUN BLDV-MCNC: 10 MG/DL (ref 7–20)
CALCIUM SERPL-MCNC: 9.6 MG/DL (ref 8.3–10.6)
CHLORIDE BLD-SCNC: 102 MMOL/L (ref 99–110)
CO2: 27 MMOL/L (ref 21–32)
CREAT SERPL-MCNC: 0.7 MG/DL (ref 0.6–1.2)
EKG ATRIAL RATE: 79 BPM
EKG ATRIAL RATE: 80 BPM
EKG DIAGNOSIS: NORMAL
EKG DIAGNOSIS: NORMAL
EKG P AXIS: 64 DEGREES
EKG P AXIS: 74 DEGREES
EKG P-R INTERVAL: 154 MS
EKG P-R INTERVAL: 168 MS
EKG Q-T INTERVAL: 414 MS
EKG Q-T INTERVAL: 422 MS
EKG QRS DURATION: 138 MS
EKG QRS DURATION: 138 MS
EKG QTC CALCULATION (BAZETT): 477 MS
EKG QTC CALCULATION (BAZETT): 483 MS
EKG R AXIS: -29 DEGREES
EKG R AXIS: -30 DEGREES
EKG T AXIS: 50 DEGREES
EKG T AXIS: 50 DEGREES
EKG VENTRICULAR RATE: 79 BPM
EKG VENTRICULAR RATE: 80 BPM
EOSINOPHILS ABSOLUTE: 0.2 K/UL (ref 0–0.6)
EOSINOPHILS RELATIVE PERCENT: 3.5 %
GFR AFRICAN AMERICAN: >60
GFR NON-AFRICAN AMERICAN: >60
GLUCOSE BLD-MCNC: 114 MG/DL (ref 70–99)
HCT VFR BLD CALC: 39 % (ref 36–48)
HEMOGLOBIN: 12.9 G/DL (ref 12–16)
LYMPHOCYTES ABSOLUTE: 1.2 K/UL (ref 1–5.1)
LYMPHOCYTES RELATIVE PERCENT: 18.4 %
MCH RBC QN AUTO: 28.4 PG (ref 26–34)
MCHC RBC AUTO-ENTMCNC: 33.1 G/DL (ref 31–36)
MCV RBC AUTO: 85.9 FL (ref 80–100)
MONOCYTES ABSOLUTE: 0.7 K/UL (ref 0–1.3)
MONOCYTES RELATIVE PERCENT: 10.8 %
NEUTROPHILS ABSOLUTE: 4.4 K/UL (ref 1.7–7.7)
NEUTROPHILS RELATIVE PERCENT: 66.6 %
PDW BLD-RTO: 14 % (ref 12.4–15.4)
PLATELET # BLD: 176 K/UL (ref 135–450)
PMV BLD AUTO: 8.3 FL (ref 5–10.5)
POTASSIUM REFLEX MAGNESIUM: 4.3 MMOL/L (ref 3.5–5.1)
PRO-BNP: 268 PG/ML (ref 0–124)
RBC # BLD: 4.54 M/UL (ref 4–5.2)
SODIUM BLD-SCNC: 138 MMOL/L (ref 136–145)
TOTAL PROTEIN: 7 G/DL (ref 6.4–8.2)
TROPONIN: <0.01 NG/ML
WBC # BLD: 6.6 K/UL (ref 4–11)

## 2022-02-01 PROCEDURE — 2580000003 HC RX 258: Performed by: INTERNAL MEDICINE

## 2022-02-01 PROCEDURE — 6370000000 HC RX 637 (ALT 250 FOR IP): Performed by: INTERNAL MEDICINE

## 2022-02-01 PROCEDURE — 99284 EMERGENCY DEPT VISIT MOD MDM: CPT

## 2022-02-01 PROCEDURE — 99214 OFFICE O/P EST MOD 30 MIN: CPT | Performed by: INTERNAL MEDICINE

## 2022-02-01 PROCEDURE — 85025 COMPLETE CBC W/AUTO DIFF WBC: CPT

## 2022-02-01 PROCEDURE — 6370000000 HC RX 637 (ALT 250 FOR IP): Performed by: EMERGENCY MEDICINE

## 2022-02-01 PROCEDURE — 93005 ELECTROCARDIOGRAM TRACING: CPT | Performed by: EMERGENCY MEDICINE

## 2022-02-01 PROCEDURE — 83880 ASSAY OF NATRIURETIC PEPTIDE: CPT

## 2022-02-01 PROCEDURE — 71045 X-RAY EXAM CHEST 1 VIEW: CPT

## 2022-02-01 PROCEDURE — 80053 COMPREHEN METABOLIC PANEL: CPT

## 2022-02-01 PROCEDURE — 84484 ASSAY OF TROPONIN QUANT: CPT

## 2022-02-01 PROCEDURE — G0378 HOSPITAL OBSERVATION PER HR: HCPCS

## 2022-02-01 PROCEDURE — 93010 ELECTROCARDIOGRAM REPORT: CPT | Performed by: INTERNAL MEDICINE

## 2022-02-01 PROCEDURE — 36415 COLL VENOUS BLD VENIPUNCTURE: CPT

## 2022-02-01 RX ORDER — MORPHINE SULFATE 2 MG/ML
2 INJECTION, SOLUTION INTRAMUSCULAR; INTRAVENOUS EVERY 4 HOURS PRN
Status: DISCONTINUED | OUTPATIENT
Start: 2022-02-01 | End: 2022-02-01 | Stop reason: HOSPADM

## 2022-02-01 RX ORDER — ONDANSETRON 4 MG/1
4 TABLET, ORALLY DISINTEGRATING ORAL EVERY 8 HOURS PRN
Status: DISCONTINUED | OUTPATIENT
Start: 2022-02-01 | End: 2022-02-01 | Stop reason: HOSPADM

## 2022-02-01 RX ORDER — CLOPIDOGREL BISULFATE 75 MG/1
75 TABLET ORAL NIGHTLY
Status: DISCONTINUED | OUTPATIENT
Start: 2022-02-01 | End: 2022-02-01 | Stop reason: HOSPADM

## 2022-02-01 RX ORDER — ONDANSETRON 2 MG/ML
4 INJECTION INTRAMUSCULAR; INTRAVENOUS EVERY 6 HOURS PRN
Status: DISCONTINUED | OUTPATIENT
Start: 2022-02-01 | End: 2022-02-01 | Stop reason: HOSPADM

## 2022-02-01 RX ORDER — ISOSORBIDE MONONITRATE 60 MG/1
120 TABLET, EXTENDED RELEASE ORAL DAILY
Status: DISCONTINUED | OUTPATIENT
Start: 2022-02-02 | End: 2022-02-01 | Stop reason: HOSPADM

## 2022-02-01 RX ORDER — PANTOPRAZOLE SODIUM 40 MG/1
40 TABLET, DELAYED RELEASE ORAL
Status: DISCONTINUED | OUTPATIENT
Start: 2022-02-01 | End: 2022-02-01 | Stop reason: HOSPADM

## 2022-02-01 RX ORDER — ASPIRIN 81 MG/1
81 TABLET, CHEWABLE ORAL DAILY
Status: DISCONTINUED | OUTPATIENT
Start: 2022-02-02 | End: 2022-02-01 | Stop reason: HOSPADM

## 2022-02-01 RX ORDER — NITROGLYCERIN 0.4 MG/1
0.4 TABLET SUBLINGUAL ONCE
Status: COMPLETED | OUTPATIENT
Start: 2022-02-01 | End: 2022-02-01

## 2022-02-01 RX ORDER — SODIUM CHLORIDE 0.9 % (FLUSH) 0.9 %
10 SYRINGE (ML) INJECTION EVERY 12 HOURS SCHEDULED
Status: DISCONTINUED | OUTPATIENT
Start: 2022-02-01 | End: 2022-02-01 | Stop reason: HOSPADM

## 2022-02-01 RX ORDER — PRAVASTATIN SODIUM 80 MG/1
80 TABLET ORAL NIGHTLY
Status: DISCONTINUED | OUTPATIENT
Start: 2022-02-01 | End: 2022-02-01 | Stop reason: HOSPADM

## 2022-02-01 RX ORDER — ACETAMINOPHEN 325 MG/1
650 TABLET ORAL EVERY 6 HOURS PRN
Status: DISCONTINUED | OUTPATIENT
Start: 2022-02-01 | End: 2022-02-01 | Stop reason: HOSPADM

## 2022-02-01 RX ORDER — ISOSORBIDE MONONITRATE 60 MG/1
60 TABLET, EXTENDED RELEASE ORAL ONCE
Status: COMPLETED | OUTPATIENT
Start: 2022-02-01 | End: 2022-02-01

## 2022-02-01 RX ORDER — ASPIRIN 81 MG/1
324 TABLET, CHEWABLE ORAL ONCE
Status: COMPLETED | OUTPATIENT
Start: 2022-02-01 | End: 2022-02-01

## 2022-02-01 RX ORDER — SODIUM CHLORIDE 0.9 % (FLUSH) 0.9 %
10 SYRINGE (ML) INJECTION PRN
Status: DISCONTINUED | OUTPATIENT
Start: 2022-02-01 | End: 2022-02-01 | Stop reason: HOSPADM

## 2022-02-01 RX ORDER — ACETAMINOPHEN 650 MG/1
650 SUPPOSITORY RECTAL EVERY 6 HOURS PRN
Status: DISCONTINUED | OUTPATIENT
Start: 2022-02-01 | End: 2022-02-01 | Stop reason: HOSPADM

## 2022-02-01 RX ORDER — LISINOPRIL 20 MG/1
40 TABLET ORAL NIGHTLY
Status: DISCONTINUED | OUTPATIENT
Start: 2022-02-01 | End: 2022-02-01 | Stop reason: HOSPADM

## 2022-02-01 RX ORDER — POLYETHYLENE GLYCOL 3350 17 G/17G
17 POWDER, FOR SOLUTION ORAL DAILY PRN
Status: DISCONTINUED | OUTPATIENT
Start: 2022-02-01 | End: 2022-02-01 | Stop reason: HOSPADM

## 2022-02-01 RX ORDER — AMLODIPINE BESYLATE 5 MG/1
5 TABLET ORAL DAILY
Status: DISCONTINUED | OUTPATIENT
Start: 2022-02-01 | End: 2022-02-01 | Stop reason: HOSPADM

## 2022-02-01 RX ORDER — ISOSORBIDE MONONITRATE 60 MG/1
60 TABLET, EXTENDED RELEASE ORAL DAILY
Status: DISCONTINUED | OUTPATIENT
Start: 2022-02-01 | End: 2022-02-01

## 2022-02-01 RX ORDER — SODIUM CHLORIDE 9 MG/ML
25 INJECTION, SOLUTION INTRAVENOUS PRN
Status: DISCONTINUED | OUTPATIENT
Start: 2022-02-01 | End: 2022-02-01 | Stop reason: HOSPADM

## 2022-02-01 RX ORDER — CARVEDILOL 25 MG/1
25 TABLET ORAL 2 TIMES DAILY WITH MEALS
Status: DISCONTINUED | OUTPATIENT
Start: 2022-02-01 | End: 2022-02-01 | Stop reason: HOSPADM

## 2022-02-01 RX ORDER — HYDROXYZINE HYDROCHLORIDE 25 MG/1
25 TABLET, FILM COATED ORAL 3 TIMES DAILY PRN
Status: DISCONTINUED | OUTPATIENT
Start: 2022-02-01 | End: 2022-02-01 | Stop reason: HOSPADM

## 2022-02-01 RX ORDER — NITROGLYCERIN 0.4 MG/1
0.4 TABLET SUBLINGUAL EVERY 5 MIN PRN
Status: DISCONTINUED | OUTPATIENT
Start: 2022-02-01 | End: 2022-02-01 | Stop reason: HOSPADM

## 2022-02-01 RX ORDER — ISOSORBIDE MONONITRATE 120 MG/1
120 TABLET, EXTENDED RELEASE ORAL DAILY
Qty: 30 TABLET | Refills: 0 | Status: SHIPPED | OUTPATIENT
Start: 2022-02-02 | End: 2022-03-01

## 2022-02-01 RX ADMIN — CARVEDILOL 25 MG: 25 TABLET, FILM COATED ORAL at 08:46

## 2022-02-01 RX ADMIN — ASPIRIN 81 MG 324 MG: 81 TABLET ORAL at 04:56

## 2022-02-01 RX ADMIN — ISOSORBIDE MONONITRATE 60 MG: 60 TABLET, EXTENDED RELEASE ORAL at 10:56

## 2022-02-01 RX ADMIN — SODIUM CHLORIDE, PRESERVATIVE FREE 10 ML: 5 INJECTION INTRAVENOUS at 08:47

## 2022-02-01 RX ADMIN — AMLODIPINE BESYLATE 5 MG: 5 TABLET ORAL at 08:45

## 2022-02-01 RX ADMIN — PANTOPRAZOLE SODIUM 40 MG: 40 TABLET, DELAYED RELEASE ORAL at 08:46

## 2022-02-01 RX ADMIN — NITROGLYCERIN 0.4 MG: 0.4 TABLET, ORALLY DISINTEGRATING SUBLINGUAL at 03:45

## 2022-02-01 RX ADMIN — ISOSORBIDE MONONITRATE 60 MG: 60 TABLET, EXTENDED RELEASE ORAL at 08:46

## 2022-02-01 RX ADMIN — ACETAMINOPHEN 650 MG: 325 TABLET ORAL at 10:56

## 2022-02-01 ASSESSMENT — PAIN DESCRIPTION - DESCRIPTORS: DESCRIPTORS: HEADACHE

## 2022-02-01 ASSESSMENT — HEART SCORE: ECG: 0

## 2022-02-01 ASSESSMENT — PAIN SCALES - GENERAL
PAINLEVEL_OUTOF10: 8
PAINLEVEL_OUTOF10: 0
PAINLEVEL_OUTOF10: 0
PAINLEVEL_OUTOF10: 6
PAINLEVEL_OUTOF10: 0

## 2022-02-01 ASSESSMENT — PAIN DESCRIPTION - ORIENTATION: ORIENTATION: LEFT

## 2022-02-01 ASSESSMENT — PAIN DESCRIPTION - LOCATION
LOCATION: HEAD
LOCATION: CHEST

## 2022-02-01 ASSESSMENT — PAIN DESCRIPTION - PAIN TYPE
TYPE: ACUTE PAIN
TYPE: ACUTE PAIN

## 2022-02-01 NOTE — H&P
Hospital Medicine History and Physical    2/1/2022    Date of Admission: 2/1/2022    Date of Service: Pt seen/examined on 2/1/2022 and admitted to observation. Assessment/plan:  1. Chest pain. Patient with extensive cardiac history, had recent cardiac catheterization in July 2021, and plan at the time is for possible intervention to diagonal if chest discomfort persist.  Continue dual antiplatelet therapy, statin. As needed nitroglycerin, morphine, ordered for pain. Obtain serial troponin. Monitor on telemetry. Cardiology consult. 2. Other comorbidities: history of CAD (on dual antiplatelets therapy, status post cardiac catheterization in 2021), essential hypertension, GERD, IBS. Activities: Up with assist  Prophylaxis: Subcutaneous Lovenox  Code status: Full    ==========================================================  Chief complaint:  Chief Complaint   Patient presents with    Chest Pain     From home. CP since this morning, radiates to left arm and jaw. Took 1 nitroglycerin prior to arrival. Hx of MI. History of Presenting Illness: This is a pleasant 66 y.o. female with history of CAD (on dual antiplatelets therapy, status post cardiac catheterization in 2021), essential hypertension, GERD, IBS, who presents to the emergency room with complaints of substernal chest discomfort that she describes as \"fluttering\", onset about 24 hours prior to arrival in the emergency room, intermittent, worsens when laying down. She reports some radiation to left jaw, left shoulder feels essentially unremarkable EKG and troponin in the emergency room. She is being admitted for further evaluation.     Past Medical History:      Diagnosis Date    Arthritis     CAD (coronary artery disease)     Cardiomyopathy ([address]     GERD (gastroesophageal reflux disease)     Hypertension     IBS (irritable bowel syndrome)     Incontinence of urine        Past Surgical History:      Procedure Laterality Date    BLADDER SURGERY  2010    sling    BLADDER SUSPENSION      COLONOSCOPY      COLONOSCOPY N/A 11/17/2020    COLONOSCOPY POLYPECTOMY SNARE/COLD BIOPSY performed by Sana Manning MD at [address]  11/17/2020    COLONOSCOPY WITH BIOPSY performed by Sana Manning MD at [address] [facility] N/A 11/19/2020    HEMORRHOIDECTOMY performed by Sana Manning MD at [address] OTHER SURGICAL HISTORY  2010    coronary stent to LAD    OTHER SURGICAL HISTORY      TVT, A/P REPAIR        Medications (prior to admission):  Prior to Admission medications    Medication Sig Start Date End Date Taking? Authorizing Provider   isosorbide mononitrate (IMDUR) 60 MG extended release tablet TAKE 1 TABLET BY MOUTH DAILY 8/19/21  Yes ROMEO Brothers CNP   pantoprazole (PROTONIX) 40 MG tablet Take 1 tablet by mouth every morning (before breakfast) 7/7/21  Yes [email or web address] ROMEO Arshad CNP   hydrOXYzine (VISTARIL) 25 MG capsule Take 25 mg by mouth 3 times daily as needed for Itching   Yes Historical Provider, MD   nitroGLYCERIN (NITROSTAT) 0.4 MG SL tablet Place 1 tablet under the tongue every 5 minutes as needed for Chest pain 6/25/21  Yes Milton Caban MD   lisinopril (PRINIVIL;ZESTRIL) 40 MG tablet TAKE 1 TABLET BY MOUTH EVERY DAY 6/7/21  Yes Milton Caban MD   clopidogrel (PLAVIX) 75 MG tablet TAKE 1 TABLET DAILY 6/7/21  Yes Milton Caban MD   carvedilol (COREG) 25 MG tablet TAKE 1 TABLET TWICE A DAY WITH MEALS 6/7/21  Yes Milton Caban MD   aspirin 81 MG chewable tablet Take 1 tablet by mouth daily   Yes Historical Provider, MD   pravastatin (PRAVACHOL) 80 MG tablet Take 1 tablet by mouth daily 12/16/20  Yes ROMEO Brothers CNP   amLODIPine (NORVASC) 10 MG tablet Take 5 mg by mouth   Yes Historical Provider, MD       Allergy(ies):  Patient has no known allergies.     Social History:  TOBACCO: reports that she has been smoking cigarettes. She has a 25.00 pack-year smoking history. She has never used smokeless tobacco.  ETOH:  reports no history of alcohol use. Family History:      Problem Relation Age of Onset    High Blood Pressure Mother     Stroke Mother     Heart Disease Father        Review of Systems:  Pertinent positives are listed in HPI. At least 10-point ROS reviewed and were negative. Vitals and physical examination:  BP (!) 141/72   Pulse 77   Temp 98 °F (36.7 °C) (Oral)   Resp 23   Ht 5' 1\" (1.549 m)   Wt 148 lb (67.1 kg)   SpO2 91%   BMI 27.96 kg/m²   Gen/overall appearance: Not in acute distress. Alert. Oriented x3. Head: Normocephalic, atraumatic  Eyes: EOMI, good acuity  ENT: Oral mucosa moist  Neck: No JVD, thyromegaly  CVS: Nml S1S2, no MRG, RRR  Pulm: Clear bilaterally. No crackles/wheezes  Gastrointestinal: Soft, NT/ND, +BS  Musculoskeletal: No edema. Warm  Neuro: No focal deficit. Moves extremity spontaneously. Psychiatry: Appropriate affect. Not agitated. Skin: Warm, dry with normal turgor. No rash  Capillary refill: Brisk,< 3 seconds   Peripheral Pulses: +2 palpable, equal bilaterally       Labs/imaging/EKG:  CBC:   Recent Labs     02/01/22 0154   WBC 6.6   HGB 12.9        BMP:    Recent Labs     02/01/22 0154      K 4.3      CO2 27   BUN 10   CREATININE 0.7   GLUCOSE 114*     Hepatic:   Recent Labs     02/01/22 0154   AST 13*   ALT 8*   BILITOT 0.3   ALKPHOS 82       XR CHEST PORTABLE    Result Date: 2/1/2022  EXAMINATION: ONE XRAY VIEW OF THE CHEST 2/1/2022 1:55 am COMPARISON: 07/04/2021 HISTORY: ORDERING SYSTEM PROVIDED HISTORY: cp TECHNOLOGIST PROVIDED HISTORY: Reason for exam:->cp Reason for Exam: chest pain Relevant Medical/Surgical History: previous CAD,cardiomyopathy and hypertension FINDINGS: Full inflation and may have some tiny chronic granulomas in the upper lobes. There is no consolidation, pleural effusion, or pneumothorax. Cardiac silhouette is not enlarged and there is no pulmonary vascular congestion. Mediastinum and dunia are within normal limits. Bony thorax is unremarkable. No acute cardiopulmonary process. EKG: Normal sinus rhythm, rate 80 beats per minutes. No acute ST changes. Nonspecific T changes present. Septal Q waves present. Right bundle branch block present. QTc 477. I reviewed EKG. Discussed with ER physician.       Thank you Juan Pablo Bustos for the opportunity to be involved in this patient's care.    -----------------------------  Darryn Clements MD  [facility]

## 2022-02-01 NOTE — DISCHARGE SUMMARY
Hospital Medicine Discharge Summary    Patient ID: Farheen Douglas      Patient's PCP: Richardson Cordova Date: 2/1/2022     Discharge Date: 2/1/2022     Admitting Physician: Darryn Clements MD     Discharge Physician: [address] [place] [address], [facility]     Discharge Diagnoses  Chest pain  History of CAD, hypertension, GERD, IBS      Hospital Course: This is a pleasant 66 y.o. female with history of CAD (on dual antiplatelets therapy, status post cardiac catheterization in 2021), essential hypertension, GERD, IBS, who presents to the emergency room with complaints of substernal chest discomfort that she describes as \"fluttering\", onset about 24 hours prior to arrival in the emergency room, intermittent, worsens when laying down. She reports some radiation to left jaw, left shoulder feels essentially unremarkable EKG and troponin in the emergency room. She is being admitted for further evaluation. Chest pain  CAD  -Cardiology consulted, chest pain resolved. Patient is not a candidate for PCI, continue medical therapy. Increase Imdur to 120 mg no plan for ischemic evaluation. Normal EKG and troponin. No acute coronary syndrome.  -Continue other home medications  -Follow-up with cardiology outpatient    History of CAD, hypertension, GERD, IBS  -Continue home medications      Patient stated they felt well and wanted to go home. Patient denied chest pain, shortness of breath, palpitations, abdominal pain, nausea vomiting, diarrhea, dysuria, headache lightheadedness or dizziness. Appetite good. Voiding without difficulty. Reviewed plan of care with patient, verbalized understanding and agreement. Denied further questions or needs.       Physical Exam Performed:     /67   Pulse 86   Temp 98.2 °F (36.8 °C) (Oral)   Resp 16   Ht 5' 1\" (1.549 m)   Wt 152 lb 11.2 oz (69.3 kg)   SpO2 94%   BMI 28.85 kg/m²       General appearance:  No apparent distress, appears stated age and cooperative. HEENT:  Normal cephalic, atraumatic without obvious deformity. Pupils equal, round, and reactive to light. Extra ocular muscles intact. Conjunctivae/corneas clear. Neck: Supple, with full range of motion. No jugular venous distention. Trachea midline. Respiratory:  Normal respiratory effort. Clear to auscultation, bilaterally without Rales/Wheezes/Rhonchi. Cardiovascular:  Regular rate and rhythm with normal S1/S2 without murmurs, rubs or gallops. Abdomen: Soft, non-tender, non-distended with normal bowel sounds. Musculoskeletal:  No clubbing, cyanosis or edema bilaterally. Full range of motion without deformity. Skin: Skin color, texture, turgor normal.  No rashes or lesions. Neurologic:  Neurovascularly intact without any focal sensory/motor deficits. Cranial nerves: II-XII intact, grossly non-focal.  Psychiatric:  Alert and oriented, thought content appropriate, normal insight  Capillary Refill: Brisk,< 3 seconds   Peripheral Pulses: +2 palpable, equal bilaterally       Labs: For convenience and continuity at follow-up the following most recent labs are provided:      CBC:    Lab Results   Component Value Date    WBC 6.6 02/01/2022    HGB 12.9 02/01/2022    HCT 39.0 02/01/2022     02/01/2022       Renal:    Lab Results   Component Value Date     02/01/2022    K 4.3 02/01/2022     02/01/2022    CO2 27 02/01/2022    BUN 10 02/01/2022    CREATININE 0.7 02/01/2022    CALCIUM 9.6 02/01/2022         Significant Diagnostic Studies    Radiology:   XR CHEST PORTABLE   Final Result   No acute cardiopulmonary process.                 Consults:     IP CONSULT TO CARDIOLOGY  IP CONSULT TO CARDIOLOGY    Disposition: Home    Condition at Discharge: Stable    Discharge Instructions/Follow-up:    Follow up with pcp in 1 week  Follow up with cardiology as directed    Code Status:  Prior     Activity: activity as tolerated    Diet: cardiac diet      Discharge Medications:     Discharge Medication List as of 2/1/2022  5:48 PM           Details   isosorbide mononitrate (IMDUR) 120 MG extended release tablet Take 1 tablet by mouth daily, Disp-30 tablet, R-0Normal              Details   pantoprazole (PROTONIX) 40 MG tablet Take 1 tablet by mouth every morning (before breakfast), Disp-30 tablet, R-3Normal      hydrOXYzine (VISTARIL) 25 MG capsule Take 25 mg by mouth 3 times daily as needed for Anxiety Historical Med      nitroGLYCERIN (NITROSTAT) 0.4 MG SL tablet Place 1 tablet under the tongue every 5 minutes as needed for Chest pain, Disp-25 tablet, R-1Normal      lisinopril (PRINIVIL;ZESTRIL) 40 MG tablet TAKE 1 TABLET BY MOUTH EVERY DAY, Disp-90 tablet, R-3Normal      clopidogrel (PLAVIX) 75 MG tablet TAKE 1 TABLET DAILY, Disp-90 tablet, R-3Normal      carvedilol (COREG) 25 MG tablet TAKE 1 TABLET TWICE A DAY WITH MEALS, Disp-180 tablet, R-3Normal      aspirin 81 MG chewable tablet Take 1 tablet by mouth dailyHistorical Med      pravastatin (PRAVACHOL) 80 MG tablet Take 1 tablet by mouth daily, Disp-90 tablet, R-1Normal      amLODIPine (NORVASC) 10 MG tablet Take 5 mg by mouth daily Historical Med             Time Spent on discharge is more than 30 minutes in the examination, evaluation, counseling and review of medications and discharge plan. Signed: ROMEO Kumari CNP   2/7/2022    The patient was seen and examined on day of discharge and this discharge summary is in conjunction with any daily progress note from day of discharge. Thank you Collin Denson for the opportunity to be involved in this patient's care. If you have any questions or concerns please feel free to contact me at [phone]. NOTE:  This report was transcribed using voice recognition software. Every effort was made to ensure accuracy; however, inadvertent computerized transcription errors may be present.

## 2022-02-01 NOTE — ED NOTES
Patient transported to floor in stable condition, alert and oriented x4 on portable tele monitor by this RN in a wheelchair with all patient belongings.       Erick Palma RN  02/01/22 3339

## 2022-02-01 NOTE — PROGRESS NOTES
Admission assessment complete. VSS. Pt denies any CP at this time. Pt states having some SOB after walking to bed from wheelchair. Pt aware of POC. Call light within reach, non skid socks on, pt independent in room.

## 2022-02-01 NOTE — ED PROVIDER NOTES
[address] [facility]  eMERGENCY dEPARTMENT eNCOUnter        Pt Name: Laura Dejesus  MRN: [id number]  [place] 1950  Date of evaluation: 2/1/2022  Provider: Darcy Reed MD  PCP: [address] [place] [address]       Chief Complaint   Patient presents with    Chest Pain     From home. CP since this morning, radiates to left arm and jaw. Took 1 nitroglycerin prior to arrival. Hx of MI.       HISTORY OFPRESENT ILLNESS   (Location/Symptom, Timing/Onset, Context/Setting, Quality, Duration, Modifying Factors,Severity)  Note limiting factors. Laura Dejesus is a 66 y.o. female had chest pain 24 hours ago around midnight that lasted an hour and then again around 11:00 tonight the pain would come and go she took nitro with some improvement but not complete she complains of left jaw aching with left shoulder aching but no chest pain there has been some dyspnea patient is a poor historian cannot recall details patient had a catheterization 7/7/2021 which showed 90% lesion of the diagonal 2  the other arteries are open  10 and 20% stenosis with an old RCA occlusion    Nursing Notes were all reviewed and agreed with or any disagreements were addressed  in the HPI. REVIEW OF SYSTEMS    (2-9 systems for level 4, 10 or more for level 5)       REVIEW OF SYSTEMS    Constitutional:  Denies fever, chills, or weakness   Eyes:  Denies vision changes  HENT:  Denies sore throat or neck pain   Respiratory:  Denies cough some shortness of breath   Cardiovascular:   chest pain  GI:  Denies abdominal pain, nausea, vomiting, or diarrhea   Musculoskeletal:  Denies back pain   Skin: no rash or vesicles   Neurologic:  no headache weakness focal    Lymphatic:  no swollen  nodes   Psychiatric: no si or hs thoughts     All systems negative except as marked. Positives and Pertinent negatives as per HPI. Except as noted above in the ROS, all other systems were reviewed andnegative. PASTMEDICAL HISTORY     Past Medical History:   Diagnosis Date    Arthritis     CAD (coronary artery disease)     Cardiomyopathy ([address]     GERD (gastroesophageal reflux disease)     Hypertension     IBS (irritable bowel syndrome)     Incontinence of urine          SURGICAL HISTORY       Past Surgical History:   Procedure Laterality Date    BLADDER SURGERY  2010    sling    BLADDER SUSPENSION      COLONOSCOPY      COLONOSCOPY N/A 11/17/2020    COLONOSCOPY POLYPECTOMY SNARE/COLD BIOPSY performed by Alisia Deal MD at [address] [facility] COLONOSCOPY  11/17/2020    COLONOSCOPY WITH BIOPSY performed by Alisia Deal MD at [address] [facility] N/A 11/19/2020    HEMORRHOIDECTOMY performed by Alisia Deal MD at [address] [facility]  2010    coronary stent to LAD    OTHER SURGICAL HISTORY      TVT, A/P REPAIR          CURRENT MEDICATIONS       Previous Medications    AMLODIPINE (NORVASC) 10 MG TABLET    Take 5 mg by mouth    ASPIRIN 81 MG CHEWABLE TABLET    Take 1 tablet by mouth daily    CARVEDILOL (COREG) 25 MG TABLET    TAKE 1 TABLET TWICE A DAY WITH MEALS    CLOPIDOGREL (PLAVIX) 75 MG TABLET    TAKE 1 TABLET DAILY    HYDROXYZINE (VISTARIL) 25 MG CAPSULE    Take 25 mg by mouth 3 times daily as needed for Itching    ISOSORBIDE MONONITRATE (IMDUR) 60 MG EXTENDED RELEASE TABLET    TAKE 1 TABLET BY MOUTH DAILY    LISINOPRIL (PRINIVIL;ZESTRIL) 40 MG TABLET    TAKE 1 TABLET BY MOUTH EVERY DAY    NITROGLYCERIN (NITROSTAT) 0.4 MG SL TABLET    Place 1 tablet under the tongue every 5 minutes as needed for Chest pain    PANTOPRAZOLE (PROTONIX) 40 MG TABLET    Take 1 tablet by mouth every morning (before breakfast)    PRAVASTATIN (PRAVACHOL) 80 MG TABLET    Take 1 tablet by mouth daily       ALLERGIES     Patient has no known allergies.     FAMILY HISTORY       Family History   Problem Relation Age of Onset    High Blood Pressure Mother     Stroke Mother     Heart Disease Father           SOCIAL HISTORY       Social History     Socioeconomic History    Marital status:      Spouse name: None    Number of children: None    Years of education: None    Highest education level: None   Occupational History    None   Tobacco Use    Smoking status: Current Every Day Smoker     Packs/day: 0.50     Years: 50.00     Pack years: 25.00     Types: Cigarettes    Smokeless tobacco: Never Used   Vaping Use    Vaping Use: Never used   Substance and Sexual Activity    Alcohol use: No    Drug use: No    Sexual activity: None   Other Topics Concern    None   Social History Narrative    None     Social Determinants of Health     Financial Resource Strain:     Difficulty of Paying Living Expenses: Not on file   Food Insecurity:     Worried About Running Out of Food in the Last Year: Not on file    Asha of Food in the Last Year: Not on file   Transportation Needs:     Lack of Transportation (Medical): Not on file    Lack of Transportation (Non-Medical):  Not on file   Physical Activity:     Days of Exercise per Week: Not on file    Minutes of Exercise per Session: Not on file   Stress:     Feeling of Stress : Not on file   Social Connections:     Frequency of Communication with Friends and Family: Not on file    Frequency of Social Gatherings with Friends and Family: Not on file    Attends Judaism Services: Not on file    Active Member of Clubs or Organizations: Not on file    Attends Club or Organization Meetings: Not on file    Marital Status: Not on file   Intimate Partner Violence:     Fear of Current or Ex-Partner: Not on file    Emotionally Abused: Not on file    Physically Abused: Not on file    Sexually Abused: Not on file   Housing Stability:     Unable to Pay for Housing in the Last Year: Not on file    Number of Places Lived in the Last Year: Not on file    Unstable Housing in the Last Year: Not on file       SCREENINGS      Heart Score for chest pain patients  History: Moderately Suspicious  ECG: Normal  Patient Age: > 65 years  *Risk factors for Atherosclerotic disease: Cigarette smoking,Hypercholesterolemia,Hypertension,Coronary Artery Disease  Risk Factors: > 3 Risk factors or history of atherosclerotic disease*  Troponin: < 1X normal limit  Heart Score Total: 5      PHYSICAL EXAM    (up to 7 for level 4, 8 or more for level 5)     ED Triage Vitals [22 0103]   BP Temp Temp Source Pulse Resp SpO2 Height Weight   (!) 134/53 98 °F (36.7 °C) Oral 73 16 95 % 5' 1\" (1.549 m) 148 lb (67.1 kg)           General Appearance:  Alert, cooperative, no distress, appears stated age. Head:  Normocephalic, without obvious abnormality, atraumatic. Eyes:  conjunctiva/corneas clear, EOM's intact. Sclera anicteric. ENT: Mucous membranes moist.   Neck: Supple, symmetrical, trachea midline, no adenopathy. No jugular venous distention. Lungs:   No Respiratory Distress. no rales  rhonchi rub   Chest Wall:  Nontender  no deformity   Heart:  Rsr no murmer gallop    Abdomen:   Soft nontender no organomegally    Extremities:  Full range of motion. no deformity   Pulses: Equal  upper and lower    Skin:  No rashes or lesions to exposed skin. Neurologic: Alert and oriented X 3. Motor grossly normal.  Speech clear. Cr n 2-12 intact   History: 1  EC  Patient Age: 1  *Risk factors for Atherosclerotic disease: Cigarette smoking; Hypercholesterolemia;  Hypertension; Coronary Artery Disease  Risk Factors: 2  Troponin: 0  Heart Score Total: 5      DIAGNOSTIC RESULTS   LABS:    Labs Reviewed   COMPREHENSIVE METABOLIC PANEL W/ REFLEX TO MG FOR LOW K - Abnormal; Notable for the following components:       Result Value    Glucose 114 (*)     ALT 8 (*)     AST 13 (*)     All other components within normal limits    Narrative:     Performed at:  [facility]  [address] [facility] [address] [facility]   Phone [phone]   BRAIN NATRIURETIC PEPTIDE - Abnormal; Notable for the following components:    Pro- (*)     All other components within normal limits    Narrative:     Performed at:  [facility] [address], [facility], [address] [facility]   Phone [phone]   CBC WITH AUTO DIFFERENTIAL    Narrative:     Performed at:  [facility] [address], [facility], [address] [place] [address]   Phone [phone]   TROPONIN    Narrative:     Performed at:  [facility] [address], [facility], [address] [place] [address]   Phone [phone]   TROPONIN       All other labs were within normal range or not returned as of thisdictation. EKG:  All EKG's are interpreted by the Emergency Department Physician who either signs or Co-signs this chart in the absence of a cardiologist.    EKG Interpretation    Interpreted by emergency department physician    Rhythm: normal sinus   Rate: normal  Axis: left  Ectopy: none  Conduction: right bundle branch block (complete)  ST Segments: no acute change  T Waves: no acute change  Q Waves: none    Clinical Impression: Normal sinus rhythm compared to EKG dated July 4, 2021    Meredith Casarez MD  EKG Interpretation    Interpreted by emergency department physician    Rhythm: normal sinus   Rate: normal  Axis: normal  Ectopy: none  Conduction: right bundle branch block (complete)  ST Segments: no acute change  T Waves: inversion in  anterior leads  Q Waves: none    Clinical Impression: Normal sinus rhythm    Meredith Casarez MD      RADIOLOGY:   Non-plain film images such as CT, Ultrasound and MRI are read by the radiologist. Diania Shuqualak images are visualized and preliminarily interpreted by the  ED Provider with the belowfindings:        Interpretation per the Radiologist below, if available at the time of this note:    XR CHEST PORTABLE   Final Result   No acute cardiopulmonary process. PROCEDURES   Unless otherwise noted below, none     Procedures    CRITICAL CARE TIME   N/A      CONSULTS:  IP CONSULT TO CARDIOLOGY  IP CONSULT TO CARDIOLOGY spoke with Mr. Rogerio Menendez covering for cardiology commended admission and consultation adjustment of medications and possible cath    EMERGENCY DEPARTMENT COURSE and DIFFERENTIAL DIAGNOSIS/MDM:   Vitals:    Vitals:    02/01/22 0358 02/01/22 0437 02/01/22 0447 02/01/22 0457   BP:  (!) 158/101 (!) 153/73    Pulse: 77 93 73 81   Resp: 23 27 20 23   Temp:       TempSrc:       SpO2: 91%  96% 97%   Weight:       Height:           Patient was given the following medications:  Medications   morphine (PF) injection 2 mg (has no administration in time range)   nitroGLYCERIN (NITROSTAT) SL tablet 0.4 mg (0.4 mg SubLINGual Given 2/1/22 0345)   aspirin chewable tablet 324 mg (324 mg Oral Given 2/1/22 0456)       Still is having some pain in the chest and jaw and left shoulder first troponin was normal nitroglycerin did not help her pain        FINAL IMPRESSION      1. Unstable angina ([address]        DISPOSITION/PLAN   DISPOSITION Admitted 02/01/2022 04:56:28 AM      PATIENT REFERRED TO:  No follow-up provider specified.     DISCHARGE MEDICATIONS:  New Prescriptions    No medications on file       DISCONTINUED MEDICATIONS:  Discontinued Medications    No medications on file              (Please note that portions of this note were completed with a voice recognition program.  Efforts were made to edit the dictations but occasionally words aremis-transcribed.)    Jose E Quinn MD (electronically signed)         Jose E Quinn MD  02/01/22 5859

## 2022-02-01 NOTE — ED NOTES
Bed: 22  Expected date:   Expected time:   Means of arrival: Walk In  Comments:     Lisa Prieto RN  02/01/22 0114

## 2022-02-01 NOTE — CONSULTS
[facility]  [phone]      Chief Complaint   Patient presents with    Chest Pain     From home. CP since this morning, radiates to left arm and jaw. Took 1 nitroglycerin prior to arrival. Hx of MI. History of Present Illness:  Landry Lebron is a 66 y.o. patient who presented to the hospital with complaints of substernal chest discomfort that is described as \"fluttering\" worsens when she lays down. She reports some radiation to left jaw/shoulder. . She has had this pain in the past. Pain was relieved with Nitro. Essentially unremarkable EKG and troponin in the emergency room. I have been asked to provide consultation regarding further management and testing. Interval history:  Her history includes coronary disease with stent placed to proximal LAD 9/10, cardiomyopathy, hypertension, and hyperlipidemia. Rachell Jeremiah performed 12/10 revealed at EF of 37%. Echo 10/13 revealed EF 25-30%. She did have GXT 12/14 and EF was 44 % , ECHO 4/18 showed EF 40%, she has not had any exacerbations of CHF. She presented to the hospital 7/4/21 with chest pain and had abnormal GXT. She had PCI to LAD on 7/7/21, severe branch CAD involving the diagonal, small vessel that is difficult to treat with PCI. Opt for medical therapy. If symptoms persist, consider PCI to D2. Past Medical History:   has a past medical history of Arthritis, CAD (coronary artery disease), Cardiomyopathy ([address] GERD (gastroesophageal reflux disease), Hyperlipidemia, Hypertension, IBS (irritable bowel syndrome), and Incontinence of urine. Surgical History:   has a past surgical history that includes Hysterectomy; Bladder surgery (2010); Colonoscopy; other surgical history (2010); Coronary angioplasty with stent; bladder suspension; other surgical history; Colonoscopy (N/A, 11/17/2020); Colonoscopy (11/17/2020); and Hemorrhoid surgery (N/A, 11/19/2020). Social History:   reports that she has been smoking cigarettes. She has a 25.00 pack-year smoking history. She has never used smokeless tobacco. She reports that she does not drink alcohol and does not use drugs. Family History:  family history includes Heart Disease in her father; High Blood Pressure in her mother; Stroke in her mother. Home Medications:  Were reviewed and are listed in nursing record. and/or listed below  Prior to Admission medications    Medication Sig Start Date End Date Taking?  Authorizing Provider   isosorbide mononitrate (IMDUR) 60 MG extended release tablet TAKE 1 TABLET BY MOUTH DAILY  Patient taking differently: Take 30 mg by mouth 2 times daily Pt takes 60 mg daily, splits 30mg in AM, 30 mg in PM 8/19/21  Yes ROMEO Gale CNP   pantoprazole (PROTONIX) 40 MG tablet Take 1 tablet by mouth every morning (before breakfast) 7/7/21  Yes ROMEO Isaacs CNP   hydrOXYzine (VISTARIL) 25 MG capsule Take 25 mg by mouth 3 times daily as needed for Anxiety    Yes Historical Provider, MD   nitroGLYCERIN (NITROSTAT) 0.4 MG SL tablet Place 1 tablet under the tongue every 5 minutes as needed for Chest pain 6/25/21  Yes Magy Tomlinson MD   lisinopril (PRINIVIL;ZESTRIL) 40 MG tablet TAKE 1 TABLET BY MOUTH EVERY DAY  Patient taking differently: Take 40 mg by mouth at bedtime TAKE 1 TABLET BY MOUTH EVERY DAY 6/7/21  Yes Magy Tomlinson MD   clopidogrel (PLAVIX) 75 MG tablet TAKE 1 TABLET DAILY  Patient taking differently: Take 75 mg by mouth nightly TAKE 1 TABLET DAILY 6/7/21  Yes Magy Tomlinson MD   carvedilol (COREG) 25 MG tablet TAKE 1 TABLET TWICE A DAY WITH MEALS 6/7/21  Yes Magy Tomlinson MD   aspirin 81 MG chewable tablet Take 1 tablet by mouth daily   Yes Historical Provider, MD   pravastatin (PRAVACHOL) 80 MG tablet Take 1 tablet by mouth daily 12/16/20  Yes ROMEO Gale CNP   amLODIPine (NORVASC) 10 MG tablet Take 5 mg by mouth daily    Yes Historical Provider, MD        Current Medications:  Current mononitrate (IMDUR) extended release tablet 120 mg  120 mg Oral Daily Vikram Morales MD        isosorbide mononitrate (IMDUR) extended release tablet 60 mg  60 mg Oral Once Vikram Morales MD            Allergies:  Patient has no known allergies. Review of Systems:     · Constitutional: there has been no unanticipated weight loss. There's been no change in energy level, sleep pattern, or activity level. · Eyes: No visual changes or diplopia. No scleral icterus. · ENT: No Headaches, hearing loss or vertigo. No mouth sores or sore throat. · Cardiovascular: Reviewed in HPI  · Respiratory: No cough or wheezing, no sputum production. No hematemesis. · Gastrointestinal: No abdominal pain, appetite loss, blood in stools. No change in bowel or bladder habits. · Genitourinary: No dysuria, trouble voiding, or hematuria. · Musculoskeletal:  No gait disturbance, weakness or joint complaints. · Integumentary: No rash or pruritis. · Neurological: No headache, diplopia, change in muscle strength, numbness or tingling. No change in gait, balance, coordination, mood, affect, memory, mentation, behavior. · Psychiatric: No anxiety, no depression. · Endocrine: No malaise, fatigue or temperature intolerance. No excessive thirst, fluid intake, or urination. No tremor. · Hematologic/Lymphatic: No abnormal bruising or bleeding, blood clots or swollen lymph nodes. · Allergic/Immunologic: No nasal congestion or hives.   ·     Physical Examination:    Vitals:    02/01/22 0830   BP: 139/77   Pulse: 80   Resp: 16   Temp: 98.5 °F (36.9 °C)   SpO2: 94%    Weight: 152 lb 11.2 oz (69.3 kg)         General Appearance:  Alert, cooperative, no distress, appears stated age   Head:  Normocephalic, without obvious abnormality, atraumatic   Eyes:  PERRL, conjunctiva/corneas clear       Nose: Nares normal, no drainage or sinus tenderness   Throat: Lips, mucosa, and tongue normal   Neck: Supple, symmetrical, trachea midline, no adenopathy, thyroid: not enlarged, symmetric, no tenderness/mass/nodules, no carotid bruit or JVD       Lungs:   Clear to auscultation bilaterally, respirations unlabored   Chest Wall:  No tenderness or deformity   Heart:  Regular rate and rhythm, S1, S2 normal, no murmur, rub or gallop   Abdomen:   Soft, non-tender, bowel sounds active all four quadrants,  no masses, no organomegaly           Extremities: Extremities normal, atraumatic, no cyanosis or edema   Pulses: 2+ and symmetric   Skin: Skin color, texture, turgor normal, no rashes or lesions   Pysch: Normal mood and affect   Neurologic: Normal gross motor and sensory exam.         Labs  CBC:   Lab Results   Component Value Date    WBC 6.6 02/01/2022    RBC 4.54 02/01/2022    HGB 12.9 02/01/2022    HCT 39.0 02/01/2022    MCV 85.9 02/01/2022    RDW 14.0 02/01/2022     02/01/2022     CMP:    Lab Results   Component Value Date     02/01/2022    K 4.3 02/01/2022     02/01/2022    CO2 27 02/01/2022    BUN 10 02/01/2022    CREATININE 0.7 02/01/2022    GFRAA >60 02/01/2022    GFRAA >60 09/04/2010    AGRATIO 1.5 02/01/2022    LABGLOM >60 02/01/2022    GLUCOSE 114 02/01/2022    PROT 7.0 02/01/2022    CALCIUM 9.6 02/01/2022    BILITOT 0.3 02/01/2022    ALKPHOS 82 02/01/2022    AST 13 02/01/2022    ALT 8 02/01/2022     PT/INR:  No results found for: PTINR  Lab Results   Component Value Date    CKMB 1.45 09/03/2010    TROPONINI <0.01 02/01/2022       EKG:  I have reviewed EKG with the following interpretation:  Impression:  Normal sinus rhythmRight bundle branch blockSeptal infarct , age undetermined P     Pt has CAD with following history:  CABG: (date/details),   Valve replacement (date/details)   GXT/Myoview/Lexiscan: (date)  (results)   Stress/echo: (date) (result)   CATH/PCI:  (date)- (results)  - (# and type of stents) -   ECHO: (date) results EF    %. DAVID (date) (results)  EP procedures/studies/ablation:  (specific data).   Device information:  Event monitor: (date/results)    All testing and labs listed below were personally reviewed. Last echo 7/7/2021  Summary  Normal left ventricle size, wall thickness and systolic function with an  estimated ejection fraction of 50- 55%. The anterior wall appears akinetic. Indeterminate diastolic function. E/e' = 10.0. Aortic valve leaflets appear mildly thickened, but opens well. Mild tricuspid regurgitation. RVSP = 15 mmHG. Last stress test 7/6/2021  Summary    -There is a large, severe intensity, fixed anterior, apical and septal    defect c/w scar.    -There is no ischemia.    -There is anterior, septal and apical hypokinesis with EF=38%. -Intermediate risk study.    -Similar to 2019 study. Last cath 7/721  Anatomy:   LM-nml   LAD-mid stent 10% ISR  Cx-nml  OM- nml  RCA-prox 100%   RPDA- L to R collaterals  LVEF- 30  LVG- severe anterior, anteroapical, apical hypokinesis  LVEDP- 17     Contrast: 53  Flouro Time: 1.7  Access: R radial a     Impression  ~Coronary Angiography w/ severe single vessel CAD, patent stent  ~LVG with LVEF of 30 and LAD regional wall motion abnormalities  ~Successful complex angioplasty and stenting of LAD  ~Severe branch CAD involving the diagonal, small vessel that is difficult to treat with PCI. Opt for medical therapy. If symptoms persists consider PCI to D2. Assessment  Patient Active Problem List   Diagnosis    CAD (coronary artery disease)    HTN (hypertension)    Hyperlipemia    Ischemic cardiomyopathy    Tobacco abuse    Chest pain    Unstable angina ([address]         Plan:  1. Increase Imdur to 120 mg  2. Follow up as outpatient with Cardiology 1 month. I had the opportunity to review the clinical symptoms and presentation of Janusz Purvis. Assessment/Plan:  Active Problems:    1. Chest pain- no chest pain today    Plan: Increase Imdur to 120 mg daily    2.  CAD -    [facility] 7/7/21, severe branch CAD involving the diagonal, small vessel that is difficult to treat with PCI. Cont medical therapy. Not candidate for PCI  ~Nuclear stress 1/2019: There is a moderate-large anterior, septal and apical fixed defect c/w scar. There is no significant ischemia. There is anterior, septal and apical hypokinesis with EF 40%. Similar to 2014 study. ~s/p PCI of proximal LAD in 2010  ~On plavix/ASA/BB/Statin/nitrate     Plan- Increase Imdur to 120 mg, no plan for ischemic evaluation. Normal ecg and troponin. No ACS. OK to discharge home later today    3. Mixed yperlipidemia     ~Lipids (5/7/20): , HDL 57,       Trig 163         On Pravachol 80 mg.     4.      Essential hypertension           ~Controlled on Norvasc, lisinopril, and coreg            ~ stable        5. Cardiomyopathy            ~Sappears compensated           ~MUGA 12/10: EF 37%           ~Echo 1/25/10: EF 20-25%, mild AR, decreased diastolic function           ~Echo 10/13: EF 25-30%. severe hypokinesis of the apical anteroseptal wall. Mild aortic regurgitation is present. Mild mitral regurgitation is present. Mild tricuspid regurgitatio with RVSP 21 mmHg           ~MUGA 6/14 39%           ~ECHO 4/18 40%           ~GXT 1/2019 40%            ~Echo 7/7/21 50-55%            ~On Coreg/ACE, continue these. OK to discharge home. FU as outpatient. I will address the patient's cardiac risk factors and adjusted pharmacologic treatment as needed. In addition, I have reinforced the need for patient directed risk factor modification. Tobacco use was discussed with the patient and educated on the negative effects. I have asked the patient to not utilize these agents. Thank you for allowing to us to participate in the ponce or René Villavicencio. Further evaluation will be based upon the patient's clinical course and testing results. All questions and concerns were addressed to the patient/family. Alternatives to my treatment were discussed.  The note was completed using EMR. Every effort was made to ensure accuracy; however, inadvertent computerized transcription errors may be present.     Puma Rios RN, MD 2/1/2022 9:55 AM

## 2022-02-02 NOTE — PLAN OF CARE
Problem: Falls - Risk of:  Goal: Will remain free from falls  Description: Will remain free from falls  2/1/2022 1923 by Durand Litten, RN  Outcome: Completed  2/1/2022 1914 by Durand Litten, RN  Outcome: Ongoing  Goal: Absence of physical injury  Description: Absence of physical injury  2/1/2022 1923 by Durand Litten, RN  Outcome: Completed  2/1/2022 1914 by Durand Litten, RN  Outcome: Ongoing

## 2022-02-02 NOTE — PROGRESS NOTES
Data- discharge order received, pt verbalized agreement to discharge, disposition to previous residence, no needs for HHC/DME. Action- discharge instructions prepared and given to pt, pt verbalized understanding. Medication information packet given r/t NEW and/or CHANGED prescriptions emphasizing name/purpose/side effects, pt verbalized understanding. Discharge instruction summary: Diet- regular, Activity- up as tolerated, Primary Care Physician as followsLily Melton [phone] f/u appointment in 1 week, immunizations reviewed and updated, prescription medications filled at pharmacy. 1. WEIGHT: Admit Weight: 148 lb (67.1 kg) (02/01/22 0103)        Today  Weight: 152 lb 11.2 oz (69.3 kg) (02/01/22 0615)       2. O2 SAT.: SpO2: 94 % (02/01/22 1115)    Response- Pt belongings gathered, IV removed. Disposition is home (no HHC/DME needs), transported with RN, taken to lobby via w/c w/ friend, no complications.

## 2022-02-27 NOTE — PROGRESS NOTES
[facility]     Outpatient Follow Up Note    CHIEF COMPLAINT / HPI: Hospital Follow Up secondary to chest pain     Hospital record has been reviewed  Hospital Course progressed as follows per discharge summary:    2/1/22  Hospital Course: This is a pleasant 76 y. o. female with history of CAD (on dual antiplatelets therapy, status post cardiac catheterization in 2021), essential hypertension, GERD, IBS, who presents to the emergency room with complaints of substernal chest discomfort that she describes as \"fluttering\", onset about 24 hours prior to arrival in the emergency room, intermittent, worsens when laying down.  She reports some radiation to left jaw, left shoulder feels essentially unremarkable EKG and troponin in the emergency room.  She is being admitted for further evaluation.     Chest pain  CAD  -Cardiology consulted, chest pain resolved. Patient is not a candidate for PCI, continue medical therapy. Increase Imdur to 120 mg no plan for ischemic evaluation. Normal EKG and troponin. No acute coronary syndrome.  -Continue other home medications  -Follow-up with cardiology outpatient     History of CAD, hypertension, GERD, IBS  -Continue home medications     Patient stated they felt well and wanted to go home. Patient denied chest pain, shortness of breath, palpitations, abdominal pain, nausea vomiting, diarrhea, dysuria, headache lightheadedness or dizziness. Appetite good. Voiding without difficulty. Reviewed plan of care with patient, verbalized understanding and agreement. Denied further questions or needs.      Norma Pena is 66 y.o. female who presents today for a routine follow up after a recent hospitalization related to the above mentioned issues. Subjective:   Since the time of discharge, the patient denies any chest pain, palpitations, dizziness, or edema. Exertional shortness of breath is at baseline.  Isosorbide was increased to 120 mg and she is having a difficult time swallowing this larger pill. Admits to smoking ~10 cigarettes/day. She is not active but tries to go shopping periodically. With regard to medication therapy the patient has been compliant with prescribed regimen. They have tolerated therapy to date.      Past Medical History:   Diagnosis Date    Arthritis     CAD (coronary artery disease)     Cardiomyopathy ([address]     GERD (gastroesophageal reflux disease)     Hyperlipidemia     Hypertension     IBS (irritable bowel syndrome)     Incontinence of urine      Social History:    Social History     Tobacco Use   Smoking Status Current Every Day Smoker    Packs/day: 0.50    Years: 50.00    Pack years: 25.00    Types: Cigarettes   Smokeless Tobacco Never Used     Current Medications:  Current Outpatient Medications   Medication Sig Dispense Refill    isosorbide mononitrate (IMDUR) 120 MG extended release tablet Take 1 tablet by mouth daily 30 tablet 0    pantoprazole (PROTONIX) 40 MG tablet Take 1 tablet by mouth every morning (before breakfast) 30 tablet 3    hydrOXYzine (VISTARIL) 25 MG capsule Take 25 mg by mouth 3 times daily as needed for Anxiety       nitroGLYCERIN (NITROSTAT) 0.4 MG SL tablet Place 1 tablet under the tongue every 5 minutes as needed for Chest pain 25 tablet 1    lisinopril (PRINIVIL;ZESTRIL) 40 MG tablet TAKE 1 TABLET BY MOUTH EVERY DAY (Patient taking differently: Take 40 mg by mouth at bedtime TAKE 1 TABLET BY MOUTH EVERY DAY) 90 tablet 3    clopidogrel (PLAVIX) 75 MG tablet TAKE 1 TABLET DAILY (Patient taking differently: Take 75 mg by mouth nightly TAKE 1 TABLET DAILY) 90 tablet 3    carvedilol (COREG) 25 MG tablet TAKE 1 TABLET TWICE A DAY WITH MEALS 180 tablet 3    aspirin 81 MG chewable tablet Take 1 tablet by mouth daily      pravastatin (PRAVACHOL) 80 MG tablet Take 1 tablet by mouth daily 90 tablet 1    amLODIPine (NORVASC) 10 MG tablet Take 5 mg by mouth daily        No current facility-administered medications for this visit. REVIEW OF SYSTEMS:   CONSTITUTIONAL: No major weight gain or loss, fatigue, weakness, night sweats or fever. There's been no change in energy level, sleep pattern, or activity level. HEENT: No new vision difficulties or ringing in the ears. RESPIRATORY: No new SOB, PND, orthopnea or cough. CARDIOVASCULAR: See HPI  GI: No nausea, vomiting, diarrhea, constipation, abdominal pain or changes in bowel habits. : No urinary frequency, urgency, incontinence hematuria or dysuria. SKIN: No cyanosis or skin lesions. MUSCULOSKELETAL: No new muscle or joint pain. NEUROLOGICAL: No syncope or TIA-like symptoms. PSYCHIATRIC: No anxiety, pain, insomnia or depression    Objective:   PHYSICAL EXAM:        VITALS:  /68   Pulse 68   Ht 5' 1\" (1.549 m)   Wt 153 lb 11.2 oz (69.7 kg)   SpO2 97%   BMI 29.04 kg/m²     CONSTITUTIONAL: Cooperative, no apparent distress, and appears well nourished / developed  NEUROLOGIC:  Awake and orientated to person, place and time. PSYCH: Calm affect. SKIN: Warm and dry. HEENT: Sclera non-icteric, normocephalic, neck supple, no elevation of JVP, normal carotid pulses with no bruits and thyroid normal size. LUNGS:  No increased work of breathing and clear to auscultation, no crackles or wheezing. CARDIOVASCULAR:  Regular rate and rhythm with no murmurs, gallops, rubs, or abnormal heart sounds, normal PMI. The apical impulses not displaced. Heart tones are crisp and normal              The carotid upstroke is normal in amplitude and contour without delay or bruit    ABDOMEN:  Normal bowel sounds, non-distended and non-tender to palpation   EXT: No edema, no calf tenderness. Pulses are present bilaterally.     DATA:    Lab Results   Component Value Date    ALT 8 (L) 02/01/2022    AST 13 (L) 02/01/2022    ALKPHOS 82 02/01/2022    BILITOT 0.3 02/01/2022     Lab Results   Component Value Date    CREATININE 0.7 02/01/2022    BUN 10 2022     2022    K 4.3 2022     2022    CO2 27 2022     No results found for: TSH, C4KKMXG, H3TOQML, THYROIDAB  Lab Results   Component Value Date    WBC 6.6 2022    HGB 12.9 2022    HCT 39.0 2022    MCV 85.9 2022     2022     No components found for: CHLPL  No results found for: TRIG  No results found for: HDL  No results found for: LDLCALC  No results found for: LABVLDL  Radiology Review:  Pertinent images / reports were reviewed as a part of this visit and reveals the following:    Last echo 2021  Summary   -Normal left ventricle size, wall thickness and systolic function with an   estimated ejection fraction of 50- 55%.  -The anterior wall appears akinetic.   -Indeterminate diastolic function. E/e' = 10.0.   -Aortic valve leaflets appear mildly thickened, but opens well.   -Mild tricuspid regurgitation. RVSP = 15 mmHG.     Last stress test 2021  Summary    -There is a large, severe intensity, fixed anterior, apical and septal    defect c/w scar.    -There is no ischemia.    -There is anterior, septal and apical hypokinesis with EF=38%.  -Intermediate risk study.    -Similar to 2019 study.      Last cath   Anatomy:   LM-nml   LAD-mid stent 10% ISR  Cx-nml  OM- nml  RCA-prox 100%   RPDA- L to R collaterals  LVEF- 30  LVG- severe anterior, anteroapical, apical hypokinesis  LVEDP- 17     Contrast: 53  Flouro Time: 1.7  Access: R radial a     Impression  ~Coronary Angiography w/ severe single vessel CAD, patent stent  ~LVG with LVEF of 30 and LAD regional wall motion abnormalities  ~Successful complex angioplasty and stenting of LAD  ~Severe branch CAD involving the diagonal, small vessel that is difficult to treat with PCI. Opt for medical therapy.  If symptoms persists consider PCI to D2.     Last EC22  Normal sinus rhythm  Right bundle branch block  Cannot rule out anteroseptal infarct, age undetermined    Assessment:      Diagnosis Orders   1. Other chest pain   ~ Resolved    ~ Imdur increased at discharge to 120 mg daily    2. Coronary artery disease involving native coronary artery of native heart without angina pectoris   ~ stable, no complaints of angina   ~ [facility] 7/7/21, severe branch CAD involving the diagonal, small vessel that is difficult to treat with PCI. Opt for medical therapy. If symptoms persist, consider PCI to D2.  ~ Nuclear stress 1/2019: There is a moderate-large anterior, septal and apical fixed defect c/w scar. There is no significant ischemia. There is anterior, septal and apical hypokinesis with EF 40%. Similar to 2014 study.  ~ s/p PCI of proximal LAD in 2010  ~ ASA / BB / statin / imdur     3. Essential hypertension   ~ controlled; 114/68 in office today    4. Mixed hyperlipidemia   ~ pravastatin 80 (maximally tolerated statin)  ~  5/7    5. Ischemic cardiomyopathy   ~ recovered/compensated   ~ Echo 7/7/21 EF 50-55%  ~ GXT 1/2019 EF 40%  ~ Echo 4/2018 EF 40%  ~ MUGA 6/2014 39%  ~ Echo 10/2013 EF 25-30%, severe hypokinesis of the apical anteroseptal wall. Mild aortic regurgitation is present. Mild mitral regurgitation is present. Mild tricuspid regurgitatio with RVSP 21 mmHg  ~ Echo 1/25/10 EF 20-25%, mild AR, decreased diastolic function  ~ coreg/ACEi     6. Tobacco abuse   ~ admits to smoking about 10 cigarettes/day  ~ encouraged smoking cessation       Patient  is stable since hospital discharge. Plan:   1. Change imdur to 60 mg BID per patient request   2. Follow up scheduled with Dr Marycarmen Toribio 5/6  3. Get fasting blood work checked. I have addressed the patient's cardiac risk factors and adjusted pharmacologic treatment as needed. In addition, I have reinforced the need for patient directed risk factor modification. Further evaluation will be based upon the patient's clinical course and testing results.     All questions and concerns were addressed to the patient    The patient currently is smoking. The risks related to smoking were reviewed with the patient. Products available for smoking cessation were discussed. Angiotension inhibitor has been prescribed / recommended for congestive heart failure. Daily weight, low sodium diet were discussed. Patient instructed to call the office with a weight gain: > 3 # over night or 5# in one week; swelling, SOB/orthopnea/PND    Dual Antiplatelet therapy / anti-coagulation has been recommended / prescribed for this patient. Education conducted on adverse reactions including bleeding was discussed. The patient verbalizes understanding. Pt is on a BB  Pt is on an ace-i/ARB  Pt is on a statin      Saturated fat diet discussed  Exercise program discussed    Thank you for allowing to us to participate in the care of Vinny Sarabia.       [facility]  Documentation of today's visit sent to PCP

## 2022-03-01 ENCOUNTER — OFFICE VISIT (OUTPATIENT)
Dept: CARDIOLOGY CLINIC | Age: 72
End: 2022-03-01
Payer: MEDICARE

## 2022-03-01 VITALS
OXYGEN SATURATION: 97 % | BODY MASS INDEX: 29.02 KG/M2 | HEIGHT: 61 IN | HEART RATE: 68 BPM | SYSTOLIC BLOOD PRESSURE: 114 MMHG | DIASTOLIC BLOOD PRESSURE: 68 MMHG | WEIGHT: 153.7 LBS

## 2022-03-01 DIAGNOSIS — I25.5 ISCHEMIC CARDIOMYOPATHY: ICD-10-CM

## 2022-03-01 DIAGNOSIS — E78.2 MIXED HYPERLIPIDEMIA: ICD-10-CM

## 2022-03-01 DIAGNOSIS — R07.89 OTHER CHEST PAIN: Primary | ICD-10-CM

## 2022-03-01 DIAGNOSIS — I10 ESSENTIAL HYPERTENSION: ICD-10-CM

## 2022-03-01 DIAGNOSIS — Z72.0 TOBACCO ABUSE: ICD-10-CM

## 2022-03-01 DIAGNOSIS — I25.10 CORONARY ARTERY DISEASE INVOLVING NATIVE CORONARY ARTERY OF NATIVE HEART WITHOUT ANGINA PECTORIS: ICD-10-CM

## 2022-03-01 PROCEDURE — 1090F PRES/ABSN URINE INCON ASSESS: CPT | Performed by: NURSE PRACTITIONER

## 2022-03-01 PROCEDURE — G8400 PT W/DXA NO RESULTS DOC: HCPCS | Performed by: NURSE PRACTITIONER

## 2022-03-01 PROCEDURE — 99214 OFFICE O/P EST MOD 30 MIN: CPT | Performed by: NURSE PRACTITIONER

## 2022-03-01 PROCEDURE — 4004F PT TOBACCO SCREEN RCVD TLK: CPT | Performed by: NURSE PRACTITIONER

## 2022-03-01 PROCEDURE — G8427 DOCREV CUR MEDS BY ELIG CLIN: HCPCS | Performed by: NURSE PRACTITIONER

## 2022-03-01 PROCEDURE — G8417 CALC BMI ABV UP PARAM F/U: HCPCS | Performed by: NURSE PRACTITIONER

## 2022-03-01 PROCEDURE — 1123F ACP DISCUSS/DSCN MKR DOCD: CPT | Performed by: NURSE PRACTITIONER

## 2022-03-01 PROCEDURE — G8484 FLU IMMUNIZE NO ADMIN: HCPCS | Performed by: NURSE PRACTITIONER

## 2022-03-01 PROCEDURE — 4040F PNEUMOC VAC/ADMIN/RCVD: CPT | Performed by: NURSE PRACTITIONER

## 2022-03-01 PROCEDURE — 3017F COLORECTAL CA SCREEN DOC REV: CPT | Performed by: NURSE PRACTITIONER

## 2022-03-01 RX ORDER — ISOSORBIDE MONONITRATE 60 MG/1
60 TABLET, EXTENDED RELEASE ORAL 2 TIMES DAILY
Qty: 180 TABLET | Refills: 3 | Status: SHIPPED | OUTPATIENT
Start: 2022-03-01

## 2022-03-03 RX ORDER — LISINOPRIL 40 MG/1
TABLET ORAL
Qty: 90 TABLET | Refills: 3 | Status: SHIPPED | OUTPATIENT
Start: 2022-03-03

## 2022-05-31 RX ORDER — CLOPIDOGREL BISULFATE 75 MG/1
TABLET ORAL
Qty: 90 TABLET | Refills: 3 | Status: SHIPPED | OUTPATIENT
Start: 2022-05-31

## 2022-05-31 RX ORDER — CARVEDILOL 25 MG/1
TABLET ORAL
Qty: 180 TABLET | Refills: 3 | Status: SHIPPED | OUTPATIENT
Start: 2022-05-31

## 2022-05-31 NOTE — TELEPHONE ENCOUNTER
Last OV: 6-25-21 psc  Last labs: no recent lipid 2-1-22 cmp  Appt scheduled : 5-31-22  Last refill:6-7-21 both med

## 2022-07-08 ENCOUNTER — OFFICE VISIT (OUTPATIENT)
Dept: CARDIOLOGY CLINIC | Age: 72
End: 2022-07-08
Payer: COMMERCIAL

## 2022-07-08 VITALS
HEART RATE: 68 BPM | SYSTOLIC BLOOD PRESSURE: 124 MMHG | DIASTOLIC BLOOD PRESSURE: 62 MMHG | OXYGEN SATURATION: 95 % | HEIGHT: 61 IN | BODY MASS INDEX: 28.45 KG/M2 | WEIGHT: 150.7 LBS

## 2022-07-08 DIAGNOSIS — R00.2 PALPITATIONS: Primary | ICD-10-CM

## 2022-07-08 DIAGNOSIS — E78.2 MIXED HYPERLIPIDEMIA: ICD-10-CM

## 2022-07-08 DIAGNOSIS — I25.10 CORONARY ARTERY DISEASE INVOLVING NATIVE CORONARY ARTERY OF NATIVE HEART WITHOUT ANGINA PECTORIS: ICD-10-CM

## 2022-07-08 DIAGNOSIS — I10 PRIMARY HYPERTENSION: ICD-10-CM

## 2022-07-08 DIAGNOSIS — R06.83 SNORES: ICD-10-CM

## 2022-07-08 PROCEDURE — 93242 EXT ECG>48HR<7D RECORDING: CPT | Performed by: INTERNAL MEDICINE

## 2022-07-08 PROCEDURE — 1123F ACP DISCUSS/DSCN MKR DOCD: CPT | Performed by: NURSE PRACTITIONER

## 2022-07-08 PROCEDURE — 93000 ELECTROCARDIOGRAM COMPLETE: CPT | Performed by: NURSE PRACTITIONER

## 2022-07-08 PROCEDURE — 99214 OFFICE O/P EST MOD 30 MIN: CPT | Performed by: NURSE PRACTITIONER

## 2022-07-08 NOTE — PROGRESS NOTES
[address]     Outpatient Follow Up Note  Acute visit    Dallas Cui is 67 y.o. female who presents today with a history of CAD s/p PTCA LAD '10, CM/EF 38% HTN and hyperlipidemia. CHIEF COMPLAINT / HPI:  Follow Up secondary to fluttering for the past few nights    Subjective:   Her heart has been fluttering. The other night she had it pretty bad so she slept in the recliner. She can't say what causes it but seems to happen when she goes to bed. It last until she falls asleep but cannot really say how long it lasts. She doesn't feel good when it happens and hasn't thought about having any associated light headedness/sweating/SOB. She used to snore but doesn't know if she still does. She denies significant chest pain. There is SOB/ with activities and has had for a long time. She's SOB going up steps. She's alright walking around [employer]. The patient denies orthopnea/PND. The patient does not have swelling. The patients weight is down a few pounds . The patient is not experiencing dizziness. These symptoms are new since the last OV. With regard to medication therapy the patient has been compliant with prescribed regimen. They have tolerated therapy to date.      Past Medical History:   Diagnosis Date    Arthritis     CAD (coronary artery disease)     Cardiomyopathy ([facility] [address]     GERD (gastroesophageal reflux disease)     Hyperlipidemia     Hypertension     IBS (irritable bowel syndrome)     Incontinence of urine      Social History:    Social History     Tobacco Use   Smoking Status Current Every Day Smoker    Packs/day: 0.50    Years: 50.00    Pack years: 25.00    Types: Cigarettes   Smokeless Tobacco Never Used     Current Medications:  Current Outpatient Medications   Medication Sig Dispense Refill    clopidogrel (PLAVIX) 75 MG tablet TAKE 1 TABLET BY MOUTH DAILY 90 tablet 3    carvedilol (COREG) 25 MG tablet TAKE 1 TABLET BY MOUTH TWICE DAILY WITH MEALS 180 tablet 3    lisinopril (PRINIVIL;ZESTRIL) 40 MG tablet TAKE 1 TABLET BY MOUTH EVERY DAY 90 tablet 3    isosorbide mononitrate (IMDUR) 60 MG extended release tablet Take 1 tablet by mouth in the morning and at bedtime 180 tablet 3    pantoprazole (PROTONIX) 40 MG tablet Take 1 tablet by mouth every morning (before breakfast) 30 tablet 3    hydrOXYzine (VISTARIL) 25 MG capsule Take 25 mg by mouth 3 times daily as needed for Anxiety       nitroGLYCERIN (NITROSTAT) 0.4 MG SL tablet Place 1 tablet under the tongue every 5 minutes as needed for Chest pain 25 tablet 1    aspirin 81 MG chewable tablet Take 1 tablet by mouth daily      pravastatin (PRAVACHOL) 80 MG tablet Take 1 tablet by mouth daily 90 tablet 1    amLODIPine (NORVASC) 10 MG tablet Take 5 mg by mouth daily        No current facility-administered medications for this visit. REVIEW OF SYSTEMS:    CONSTITUTIONAL: No major weight gain or loss, fatigue, weakness, night sweats or fever. HEENT: No new vision difficulties or ringing in the ears. RESPIRATORY: No new SOB, PND, orthopnea or cough. CARDIOVASCULAR: See HPI  GI: No nausea, vomiting, diarrhea, constipation, abdominal pain or changes in bowel habits. : No urinary frequency, urgency, incontinence hematuria or dysuria. SKIN: No cyanosis or skin lesions. MUSCULOSKELETAL: No new muscle or joint pain. NEUROLOGICAL: No syncope or TIA-like symptoms.   PSYCHIATRIC: No anxiety, pain, insomnia or depression    Objective:   PHYSICAL EXAM:        Vitals:    07/08/22 1035 07/08/22 1059   BP: 130/60 124/62   Site: Left Upper Arm    Position: Sitting    Cuff Size: Medium Adult    Pulse: 68    SpO2: 95%    Weight: 150 lb 11.2 oz (68.4 kg)    Height: 5' 1\" (1.549 m)        VITALS:  /62   Pulse 68   Ht 5' 1\" (1.549 m)   Wt 150 lb 11.2 oz (68.4 kg)   SpO2 95%   BMI 28.47 kg/m²   CONSTITUTIONAL: Cooperative, no apparent distress, and appears well nourished / developed  NEUROLOGIC:  Awake and orientated to BILIRUBIN,TOTAL 0.0 - 1.2 MG/DL 0.4     AST(SGOT) 0 - 55 U/L 14     ALT(SGPT) 0 - 60 U/L 8     ALKALINE PHOSPHATASE 23 - 144 U/L 96     FASTING          Radiology Review:  Pertinent images / reports were reviewed as a part of this visit and reveals the following:    Last Echo: July '21   Summary   -Normal left ventricle size, wall thickness and systolic function with an   estimated ejection fraction of 50- 55%. -The anterior wall appears akinetic.   -Indeterminate diastolic function. E/e' = 10.0.   -Aortic valve leaflets appear mildly thickened, but opens well. -Mild tricuspid regurgitation. RVSP = 15 mmHG. Last Stress Test: July '21  Summary    -There is a large, severe intensity, fixed anterior, apical and septal    defect c/w scar.    -There is no ischemia.    -There is anterior, septal and apical hypokinesis with EF=38%.  -Intermediate risk study.    -Similar to 2019 study.       Stress Protocols        Resting ECG    Normal sinus rhythm.    Right bundle branch block.    Anterior infarct     Last cath 7/721  Anatomy:   LM-nml   LAD-mid stent 10% ISR  Cx-nml  OM- nml  RCA-prox 100%   RPDA- L to R collaterals  LVEF- 30  LVG- severe anterior, anteroapical, apical hypokinesis  LVEDP- 17  Impression  ~Coronary Angiography w/ severe single vessel CAD, patent stent  ~LVG with LVEF of 30 and LAD regional wall motion abnormalities  ~Successful complex angioplasty and stenting of LAD  ~Severe branch CAD involving the diagonal, small vessel that is difficult to treat with PCI. Opt for medical therapy. If symptoms persists consider PCI to D2. Assessment:      Diagnosis Orders   1. Palpitations   ~recurrent / bothersome ;similar to presentation in Feb '22 upon reviewing chart  ~AP regular  ~possible untx sleep apnea vs arrhythmia   ~LA WNL EKG 12 lead    Ana Serrano MD, Pulmonary, Critical Care & Sleep, [facility]    EKG 12 lead    Cardiac event monitor   2.  Coronary artery disease involving native coronary artery of native heart without angina pectoris   ~stable : denies angina  ~s/p PTCA LAD ; severe branch disease diag-2 (medically managed);  RCA with collaterals  ~anterior wall AK  ~s/p PTCA prox-LAD '10  ~DAPT / BB / statin / CCB / nitrate    3. Primary hypertension  ~controlled on current regimen    4. Mixed hyperlipidemia   ~controlled on pravastatin    5. Pk Traore MD, Pulmonary, Critical Care & Sleep, [facility]         I had the opportunity to review the clinical symptoms and presentation of Jewel Santos. Plan:     1. EKG: sinus rhythm, RBBB   EKG : sinus rhythm, no ectopy (supine position)   7 day Event monitor : assess rate/rhythm  2. Referral to pul / sleep evaluation  3. F/U in 6 weeks    Overall the patient is stable from CV standpoint    I have addresed the patient's cardiac risk factors and adjusted pharmacologic treatment as needed. In addition, I have reinforced the need for patient directed risk factor modification. Further evaluation will be based upon the patient's clinical course and testing results. All questions and concerns were addressed to the patient. Alternatives to my treatment were discussed. The patient is currently smokin/2 ppd. The risks related to smoking were reviewed with the patient. Recommend maintaining a smoke-free lifestyle. Products available for smoking cessation were discussed in detail. Patient is on a beta-blocker  Patient is on an ace-i/ARB  Patient is on a statin    Dual Antiplatelet therapy has been recommended / prescribed for this patient. Education conducted on adverse reactions including bleeding was discussed. The patient verbalizes understanding not to stop medications without discussing with us. Discussed exercise: 30-60 minutes 7 days/week  Discussed Low saturated fat/TRISTAN diet. Thank you for allowing to us to participate in the care of Jewel Santos.     Zulema Cassette

## 2022-07-14 ENCOUNTER — NURSE ONLY (OUTPATIENT)
Dept: CARDIOLOGY CLINIC | Age: 72
End: 2022-07-14

## 2022-07-19 ENCOUNTER — TELEPHONE (OUTPATIENT)
Dept: CARDIOLOGY CLINIC | Age: 72
End: 2022-07-19

## 2022-08-09 PROCEDURE — 93244 EXT ECG>48HR<7D REV&INTERPJ: CPT | Performed by: INTERNAL MEDICINE

## 2022-08-15 ENCOUNTER — TELEPHONE (OUTPATIENT)
Dept: CARDIOLOGY CLINIC | Age: 72
End: 2022-08-15

## 2022-08-15 NOTE — TELEPHONE ENCOUNTER
Overall looks ok; continue carvedilol    ----- Message -----   From: Jonel Gross MA   Sent: 8/12/2022   4:50 PM EDT   To: ROMEO Garcias CNP   Subject: Edit                                               The scan below was edited by GEOVANNA Lloydy  on 8/12/2022 at 4:50 PM; it is attached to the following: Cardiac event monitor on 7/8/2022   Called patient no answering machine.

## 2023-06-14 ENCOUNTER — APPOINTMENT (OUTPATIENT)
Dept: GENERAL RADIOLOGY | Age: 73
End: 2023-06-14
Payer: MEDICARE

## 2023-06-14 ENCOUNTER — HOSPITAL ENCOUNTER (OUTPATIENT)
Age: 73
Setting detail: OBSERVATION
Discharge: HOME | End: 2023-06-15
Attending: HOSPITALIST | Admitting: HOSPITALIST
Payer: MEDICARE

## 2023-06-14 DIAGNOSIS — R07.9 CHEST PAIN, UNSPECIFIED TYPE: Primary | ICD-10-CM

## 2023-06-14 DIAGNOSIS — R00.2 FLUTTERING SENSATION OF HEART: ICD-10-CM

## 2023-06-14 DIAGNOSIS — R06.02 SHORTNESS OF BREATH: ICD-10-CM

## 2023-06-14 LAB
ALBUMIN SERPL-MCNC: 4.1 G/DL (ref 3.4–5)
ALBUMIN/GLOB SERPL: 1.3 {RATIO} (ref 1.1–2.2)
ALP SERPL-CCNC: 114 U/L (ref 40–129)
ALT SERPL-CCNC: 6 U/L (ref 10–40)
ANION GAP SERPL CALCULATED.3IONS-SCNC: 11 MMOL/L (ref 3–16)
AST SERPL-CCNC: 15 U/L (ref 15–37)
BACTERIA URNS QL MICRO: NORMAL /HPF
BASOPHILS # BLD: 0.2 K/UL (ref 0–0.2)
BASOPHILS NFR BLD: 3 %
BILIRUB SERPL-MCNC: 0.3 MG/DL (ref 0–1)
BILIRUB UR QL STRIP.AUTO: NEGATIVE
BUN SERPL-MCNC: 7 MG/DL (ref 7–20)
CALCIUM SERPL-MCNC: 9 MG/DL (ref 8.3–10.6)
CHLORIDE SERPL-SCNC: 102 MMOL/L (ref 99–110)
CLARITY UR: CLEAR
CO2 SERPL-SCNC: 25 MMOL/L (ref 21–32)
COLOR UR: YELLOW
CREAT SERPL-MCNC: 0.7 MG/DL (ref 0.6–1.2)
DEPRECATED RDW RBC AUTO: 16.2 % (ref 12.4–15.4)
EOSINOPHIL # BLD: 0.3 K/UL (ref 0–0.6)
EOSINOPHIL NFR BLD: 4.4 %
EPI CELLS #/AREA URNS AUTO: 2 /HPF (ref 0–5)
GFR SERPLBLD CREATININE-BSD FMLA CKD-EPI: >60 ML/MIN/{1.73_M2}
GLUCOSE SERPL-MCNC: 125 MG/DL (ref 70–99)
GLUCOSE UR STRIP.AUTO-MCNC: NEGATIVE MG/DL
HCT VFR BLD AUTO: 35.3 % (ref 36–48)
HGB BLD-MCNC: 11.2 G/DL (ref 12–16)
HGB UR QL STRIP.AUTO: ABNORMAL
HYALINE CASTS #/AREA URNS AUTO: 1 /LPF (ref 0–8)
KETONES UR STRIP.AUTO-MCNC: NEGATIVE MG/DL
LEUKOCYTE ESTERASE UR QL STRIP.AUTO: ABNORMAL
LIPASE SERPL-CCNC: 29 U/L (ref 13–60)
LYMPHOCYTES # BLD: 0.7 K/UL (ref 1–5.1)
LYMPHOCYTES NFR BLD: 11.7 %
MCH RBC QN AUTO: 25 PG (ref 26–34)
MCHC RBC AUTO-ENTMCNC: 31.7 G/DL (ref 31–36)
MCV RBC AUTO: 78.7 FL (ref 80–100)
MONOCYTES # BLD: 0.5 K/UL (ref 0–1.3)
MONOCYTES NFR BLD: 8.4 %
NEUTROPHILS # BLD: 4.5 K/UL (ref 1.7–7.7)
NEUTROPHILS NFR BLD: 72.5 %
NITRITE UR QL STRIP.AUTO: NEGATIVE
NT-PROBNP SERPL-MCNC: 411 PG/ML (ref 0–449)
PH UR STRIP.AUTO: 6 [PH] (ref 5–8)
PLATELET # BLD AUTO: 225 K/UL (ref 135–450)
PMV BLD AUTO: 7 FL (ref 5–10.5)
POTASSIUM SERPL-SCNC: 4.1 MMOL/L (ref 3.5–5.1)
PROT SERPL-MCNC: 7.2 G/DL (ref 6.4–8.2)
PROT UR STRIP.AUTO-MCNC: NEGATIVE MG/DL
RBC # BLD AUTO: 4.49 M/UL (ref 4–5.2)
RBC CLUMPS #/AREA URNS AUTO: 1 /HPF (ref 0–4)
SODIUM SERPL-SCNC: 138 MMOL/L (ref 136–145)
SP GR UR STRIP.AUTO: <=1.005 (ref 1–1.03)
TROPONIN, HIGH SENSITIVITY: 8 NG/L (ref 0–14)
UA COMPLETE W REFLEX CULTURE PNL UR: ABNORMAL
UA DIPSTICK W REFLEX MICRO PNL UR: YES
URN SPEC COLLECT METH UR: ABNORMAL
UROBILINOGEN UR STRIP-ACNC: 0.2 E.U./DL
WBC # BLD AUTO: 6.2 K/UL (ref 4–11)
WBC #/AREA URNS AUTO: 2 /HPF (ref 0–5)

## 2023-06-14 PROCEDURE — 71045 X-RAY EXAM CHEST 1 VIEW: CPT

## 2023-06-14 PROCEDURE — 6370000000 HC RX 637 (ALT 250 FOR IP): Performed by: NURSE PRACTITIONER

## 2023-06-14 PROCEDURE — 99285 EMERGENCY DEPT VISIT HI MDM: CPT

## 2023-06-14 PROCEDURE — 83880 ASSAY OF NATRIURETIC PEPTIDE: CPT

## 2023-06-14 PROCEDURE — 93005 ELECTROCARDIOGRAM TRACING: CPT | Performed by: PHYSICIAN ASSISTANT

## 2023-06-14 PROCEDURE — 6360000002 HC RX W HCPCS: Performed by: HOSPITALIST

## 2023-06-14 PROCEDURE — 2580000003 HC RX 258: Performed by: HOSPITALIST

## 2023-06-14 PROCEDURE — 36415 COLL VENOUS BLD VENIPUNCTURE: CPT

## 2023-06-14 PROCEDURE — 85025 COMPLETE CBC W/AUTO DIFF WBC: CPT

## 2023-06-14 PROCEDURE — G0378 HOSPITAL OBSERVATION PER HR: HCPCS

## 2023-06-14 PROCEDURE — 6370000000 HC RX 637 (ALT 250 FOR IP): Performed by: HOSPITALIST

## 2023-06-14 PROCEDURE — 83690 ASSAY OF LIPASE: CPT

## 2023-06-14 PROCEDURE — 80053 COMPREHEN METABOLIC PANEL: CPT

## 2023-06-14 PROCEDURE — 96372 THER/PROPH/DIAG INJ SC/IM: CPT

## 2023-06-14 PROCEDURE — 81001 URINALYSIS AUTO W/SCOPE: CPT

## 2023-06-14 PROCEDURE — 84484 ASSAY OF TROPONIN QUANT: CPT

## 2023-06-14 RX ORDER — ONDANSETRON 4 MG/1
4 TABLET, ORALLY DISINTEGRATING ORAL EVERY 8 HOURS PRN
Status: DISCONTINUED | OUTPATIENT
Start: 2023-06-14 | End: 2023-06-15 | Stop reason: HOSPADM

## 2023-06-14 RX ORDER — PANTOPRAZOLE SODIUM 40 MG/1
40 TABLET, DELAYED RELEASE ORAL
Status: DISCONTINUED | OUTPATIENT
Start: 2023-06-15 | End: 2023-06-15 | Stop reason: HOSPADM

## 2023-06-14 RX ORDER — ASPIRIN 81 MG/1
81 TABLET, CHEWABLE ORAL DAILY
Status: DISCONTINUED | OUTPATIENT
Start: 2023-06-15 | End: 2023-06-15 | Stop reason: HOSPADM

## 2023-06-14 RX ORDER — CLOPIDOGREL BISULFATE 75 MG/1
75 TABLET ORAL DAILY
Status: DISCONTINUED | OUTPATIENT
Start: 2023-06-14 | End: 2023-06-15 | Stop reason: HOSPADM

## 2023-06-14 RX ORDER — MORPHINE SULFATE 2 MG/ML
2 INJECTION, SOLUTION INTRAMUSCULAR; INTRAVENOUS EVERY 4 HOURS PRN
Status: DISCONTINUED | OUTPATIENT
Start: 2023-06-14 | End: 2023-06-15 | Stop reason: HOSPADM

## 2023-06-14 RX ORDER — SODIUM CHLORIDE 0.9 % (FLUSH) 0.9 %
5-40 SYRINGE (ML) INJECTION PRN
Status: DISCONTINUED | OUTPATIENT
Start: 2023-06-14 | End: 2023-06-15 | Stop reason: HOSPADM

## 2023-06-14 RX ORDER — ONDANSETRON 2 MG/ML
4 INJECTION INTRAMUSCULAR; INTRAVENOUS EVERY 6 HOURS PRN
Status: DISCONTINUED | OUTPATIENT
Start: 2023-06-14 | End: 2023-06-15 | Stop reason: HOSPADM

## 2023-06-14 RX ORDER — SODIUM CHLORIDE 0.9 % (FLUSH) 0.9 %
5-40 SYRINGE (ML) INJECTION EVERY 12 HOURS SCHEDULED
Status: DISCONTINUED | OUTPATIENT
Start: 2023-06-14 | End: 2023-06-15 | Stop reason: HOSPADM

## 2023-06-14 RX ORDER — LISINOPRIL 20 MG/1
40 TABLET ORAL DAILY
Status: DISCONTINUED | OUTPATIENT
Start: 2023-06-14 | End: 2023-06-15 | Stop reason: HOSPADM

## 2023-06-14 RX ORDER — ENOXAPARIN SODIUM 100 MG/ML
40 INJECTION SUBCUTANEOUS NIGHTLY
Status: DISCONTINUED | OUTPATIENT
Start: 2023-06-14 | End: 2023-06-15 | Stop reason: HOSPADM

## 2023-06-14 RX ORDER — PRAVASTATIN SODIUM 40 MG
80 TABLET ORAL DAILY
Status: DISCONTINUED | OUTPATIENT
Start: 2023-06-14 | End: 2023-06-15 | Stop reason: HOSPADM

## 2023-06-14 RX ORDER — AMLODIPINE BESYLATE 5 MG/1
5 TABLET ORAL DAILY
Status: DISCONTINUED | OUTPATIENT
Start: 2023-06-15 | End: 2023-06-15 | Stop reason: HOSPADM

## 2023-06-14 RX ORDER — POLYETHYLENE GLYCOL 3350 17 G/17G
17 POWDER, FOR SOLUTION ORAL DAILY PRN
Status: DISCONTINUED | OUTPATIENT
Start: 2023-06-14 | End: 2023-06-15 | Stop reason: HOSPADM

## 2023-06-14 RX ORDER — CARVEDILOL 25 MG/1
25 TABLET ORAL 2 TIMES DAILY WITH MEALS
Status: DISCONTINUED | OUTPATIENT
Start: 2023-06-14 | End: 2023-06-15 | Stop reason: HOSPADM

## 2023-06-14 RX ORDER — ACETAMINOPHEN 650 MG/1
650 SUPPOSITORY RECTAL EVERY 6 HOURS PRN
Status: DISCONTINUED | OUTPATIENT
Start: 2023-06-14 | End: 2023-06-15 | Stop reason: HOSPADM

## 2023-06-14 RX ORDER — NITROGLYCERIN 0.4 MG/1
0.4 TABLET SUBLINGUAL EVERY 5 MIN PRN
Status: DISCONTINUED | OUTPATIENT
Start: 2023-06-14 | End: 2023-06-15 | Stop reason: HOSPADM

## 2023-06-14 RX ORDER — LANOLIN ALCOHOL/MO/W.PET/CERES
3 CREAM (GRAM) TOPICAL NIGHTLY PRN
Status: DISCONTINUED | OUTPATIENT
Start: 2023-06-14 | End: 2023-06-15 | Stop reason: HOSPADM

## 2023-06-14 RX ORDER — SODIUM CHLORIDE 9 MG/ML
INJECTION, SOLUTION INTRAVENOUS PRN
Status: DISCONTINUED | OUTPATIENT
Start: 2023-06-14 | End: 2023-06-15 | Stop reason: HOSPADM

## 2023-06-14 RX ORDER — ISOSORBIDE MONONITRATE 60 MG/1
60 TABLET, EXTENDED RELEASE ORAL 2 TIMES DAILY
Status: DISCONTINUED | OUTPATIENT
Start: 2023-06-14 | End: 2023-06-15 | Stop reason: HOSPADM

## 2023-06-14 RX ORDER — ACETAMINOPHEN 325 MG/1
650 TABLET ORAL EVERY 6 HOURS PRN
Status: DISCONTINUED | OUTPATIENT
Start: 2023-06-14 | End: 2023-06-15 | Stop reason: HOSPADM

## 2023-06-14 RX ADMIN — MELATONIN TAB 3 MG 3 MG: 3 TAB at 22:54

## 2023-06-14 RX ADMIN — SODIUM CHLORIDE, PRESERVATIVE FREE 10 ML: 5 INJECTION INTRAVENOUS at 20:23

## 2023-06-14 RX ADMIN — LISINOPRIL 40 MG: 20 TABLET ORAL at 20:22

## 2023-06-14 RX ADMIN — ENOXAPARIN SODIUM 40 MG: 100 INJECTION SUBCUTANEOUS at 20:23

## 2023-06-14 RX ADMIN — ISOSORBIDE MONONITRATE 60 MG: 60 TABLET, EXTENDED RELEASE ORAL at 20:23

## 2023-06-14 RX ADMIN — CARVEDILOL 25 MG: 25 TABLET, FILM COATED ORAL at 20:23

## 2023-06-14 RX ADMIN — PRAVASTATIN SODIUM 80 MG: 40 TABLET ORAL at 20:22

## 2023-06-14 RX ADMIN — CLOPIDOGREL BISULFATE 75 MG: 75 TABLET ORAL at 20:22

## 2023-06-14 ASSESSMENT — ENCOUNTER SYMPTOMS
CHEST TIGHTNESS: 0
NAUSEA: 0
DIARRHEA: 0
VOMITING: 0
COUGH: 1
ABDOMINAL PAIN: 0
SHORTNESS OF BREATH: 1
WHEEZING: 0

## 2023-06-14 ASSESSMENT — LIFESTYLE VARIABLES
HOW MANY STANDARD DRINKS CONTAINING ALCOHOL DO YOU HAVE ON A TYPICAL DAY: PATIENT DOES NOT DRINK
HOW OFTEN DO YOU HAVE A DRINK CONTAINING ALCOHOL: NEVER

## 2023-06-14 ASSESSMENT — PAIN SCALES - GENERAL
PAINLEVEL_OUTOF10: 0
PAINLEVEL_OUTOF10: 0

## 2023-06-14 ASSESSMENT — HEART SCORE: ECG: 1

## 2023-06-14 ASSESSMENT — PAIN - FUNCTIONAL ASSESSMENT: PAIN_FUNCTIONAL_ASSESSMENT: 0-10

## 2023-06-14 NOTE — ED PROVIDER NOTES
[facility]        Pt Name: Natan Dumont  MRN: [id number]  [place] 1950  Date of evaluation: 6/14/2023  Provider: Pascale Umanzor PA-C  PCP: [facility]  Note Started: 1:19 PM EDT 6/14/23      SHABANA. I have evaluated this patient. CHIEF COMPLAINT       Chief Complaint   Patient presents with    Chest Pain     Patient states fluttering CP all weekend, intermittent, none at this time, does endorse pain to back       HISTORY OF PRESENT ILLNESS: 1 or more Elements     History from : Patient    Limitations to history : None    Natan Dumont is a 75 y.o. female with a history of hypertension, hyperlipidemia, CAD, cardiomyopathy and tobacco abuse who presents to the emergency department today stating she has had intermittent chest pain, shortness of breath and coughing for a very long time. She states since Sunday she has had intermittent episodes of heart \"fluttering\". She also feels her chest pain and shortness of breath have been worse and she reports generalized weakness and fatigue. She denies having any diaphoresis, lightheadedness or dizziness. She denies fevers, chills, productive coughing or recent illness. She denies lower extremity edema. Nursing Notes were all reviewed and agreed with or any disagreements were addressed in the HPI. REVIEW OF SYSTEMS :      Review of Systems   Constitutional:  Negative for chills and fever. Respiratory:  Positive for cough and shortness of breath. Negative for chest tightness and wheezing. Cardiovascular:  Positive for chest pain and palpitations. Negative for leg swelling. Gastrointestinal:  Negative for abdominal pain, diarrhea, nausea and vomiting. Genitourinary:  Negative for difficulty urinating, dysuria, flank pain, frequency, hematuria and urgency. Neurological:  Negative for dizziness, light-headedness, numbness and headaches.    All other systems reviewed and

## 2023-06-14 NOTE — PROGRESS NOTES
Patient seen in ED, room 30. Admission initiated and completed through personal belongings. IP RN will need to begin with Psychosocial Review and complete the admission including the followin Eyes Assessment, Immunizations, Covid Vaccines, Rights and Responsibilities, Orientation to room, Plan of Care, Education/Learning Assessment and Education Plan, white board, height and weight, pain assessment and head to toe assessment. Patient is alert and is being admitted for chest pain. Home Medication reconciliation was completed by Damaris Newton. Plan of care updated if indicated. All questions answered.

## 2023-06-14 NOTE — PROGRESS NOTES
Pt admitted from ER to Room 5909. Pt has all belongings verified by pt from Emergency Room. Telemetry monitor on pt, SR on the monitor. Bed wheels locked and bed in lowest position. Educated pt on floor, room number, call light, fall risk, diet, POC, medications, and to use call light if ever in need of assistance or if they have any questions. Fall assessment completed, no bed alarm needed at this time. Encouraged to use call light before getting out of bed. Pt verbalized understanding and has no more questions at this time.

## 2023-06-14 NOTE — H&P
component to this as well  -No signs of acute infection. Will monitor    Fluttering sensation of heart  -We will monitor on telemetry see if we can catch any arrhythmias.     Hypertension        Donte Miller MD    6/14/2023 4:57 PM

## 2023-06-14 NOTE — ED NOTES
Report given to [facility], all questions answered during handoff report, and VSS for the floor at this time.      Sunday Byrd RN  06/14/23 5302

## 2023-06-15 VITALS
SYSTOLIC BLOOD PRESSURE: 125 MMHG | OXYGEN SATURATION: 97 % | HEIGHT: 61 IN | HEART RATE: 78 BPM | DIASTOLIC BLOOD PRESSURE: 63 MMHG | TEMPERATURE: 97.8 F | BODY MASS INDEX: 29.14 KG/M2 | RESPIRATION RATE: 18 BRPM | WEIGHT: 154.32 LBS

## 2023-06-15 LAB
ANION GAP SERPL CALCULATED.3IONS-SCNC: 10 MMOL/L (ref 3–16)
BASOPHILS # BLD: 0.1 K/UL (ref 0–0.2)
BASOPHILS NFR BLD: 1 %
BUN SERPL-MCNC: 13 MG/DL (ref 7–20)
CALCIUM SERPL-MCNC: 8.9 MG/DL (ref 8.3–10.6)
CHLORIDE SERPL-SCNC: 103 MMOL/L (ref 99–110)
CO2 SERPL-SCNC: 25 MMOL/L (ref 21–32)
CREAT SERPL-MCNC: 0.7 MG/DL (ref 0.6–1.2)
DEPRECATED RDW RBC AUTO: 16.1 % (ref 12.4–15.4)
EOSINOPHIL # BLD: 0.2 K/UL (ref 0–0.6)
EOSINOPHIL NFR BLD: 4.4 %
GFR SERPLBLD CREATININE-BSD FMLA CKD-EPI: >60 ML/MIN/{1.73_M2}
GLUCOSE SERPL-MCNC: 97 MG/DL (ref 70–99)
HCT VFR BLD AUTO: 32.6 % (ref 36–48)
HGB BLD-MCNC: 10.2 G/DL (ref 12–16)
LYMPHOCYTES # BLD: 1.5 K/UL (ref 1–5.1)
LYMPHOCYTES NFR BLD: 26.9 %
MCH RBC QN AUTO: 24.7 PG (ref 26–34)
MCHC RBC AUTO-ENTMCNC: 31.2 G/DL (ref 31–36)
MCV RBC AUTO: 79 FL (ref 80–100)
MONOCYTES # BLD: 0.6 K/UL (ref 0–1.3)
MONOCYTES NFR BLD: 11.7 %
NEUTROPHILS # BLD: 3.1 K/UL (ref 1.7–7.7)
NEUTROPHILS NFR BLD: 56 %
PLATELET # BLD AUTO: 198 K/UL (ref 135–450)
PMV BLD AUTO: 7.2 FL (ref 5–10.5)
POTASSIUM SERPL-SCNC: 3.9 MMOL/L (ref 3.5–5.1)
RBC # BLD AUTO: 4.12 M/UL (ref 4–5.2)
SODIUM SERPL-SCNC: 138 MMOL/L (ref 136–145)
TROPONIN, HIGH SENSITIVITY: 9 NG/L (ref 0–14)
WBC # BLD AUTO: 5.5 K/UL (ref 4–11)

## 2023-06-15 PROCEDURE — 36415 COLL VENOUS BLD VENIPUNCTURE: CPT

## 2023-06-15 PROCEDURE — 84484 ASSAY OF TROPONIN QUANT: CPT

## 2023-06-15 PROCEDURE — 6370000000 HC RX 637 (ALT 250 FOR IP): Performed by: HOSPITALIST

## 2023-06-15 PROCEDURE — 85025 COMPLETE CBC W/AUTO DIFF WBC: CPT

## 2023-06-15 PROCEDURE — 80048 BASIC METABOLIC PNL TOTAL CA: CPT

## 2023-06-15 PROCEDURE — G0378 HOSPITAL OBSERVATION PER HR: HCPCS

## 2023-06-15 PROCEDURE — 2580000003 HC RX 258: Performed by: HOSPITALIST

## 2023-06-15 PROCEDURE — 99222 1ST HOSP IP/OBS MODERATE 55: CPT | Performed by: INTERNAL MEDICINE

## 2023-06-15 PROCEDURE — 6370000000 HC RX 637 (ALT 250 FOR IP): Performed by: INTERNAL MEDICINE

## 2023-06-15 RX ORDER — RANOLAZINE 500 MG/1
500 TABLET, EXTENDED RELEASE ORAL 2 TIMES DAILY
Status: DISCONTINUED | OUTPATIENT
Start: 2023-06-15 | End: 2023-06-15 | Stop reason: HOSPADM

## 2023-06-15 RX ORDER — RANOLAZINE 500 MG/1
500 TABLET, EXTENDED RELEASE ORAL 2 TIMES DAILY
Qty: 60 TABLET | Refills: 3 | Status: SHIPPED | OUTPATIENT
Start: 2023-06-15

## 2023-06-15 RX ADMIN — SODIUM CHLORIDE, PRESERVATIVE FREE 10 ML: 5 INJECTION INTRAVENOUS at 08:12

## 2023-06-15 RX ADMIN — ISOSORBIDE MONONITRATE 60 MG: 60 TABLET, EXTENDED RELEASE ORAL at 08:11

## 2023-06-15 RX ADMIN — ASPIRIN 81 MG: 81 TABLET, CHEWABLE ORAL at 08:10

## 2023-06-15 RX ADMIN — AMLODIPINE BESYLATE 5 MG: 5 TABLET ORAL at 08:10

## 2023-06-15 RX ADMIN — LISINOPRIL 40 MG: 20 TABLET ORAL at 08:10

## 2023-06-15 RX ADMIN — CARVEDILOL 25 MG: 25 TABLET, FILM COATED ORAL at 12:23

## 2023-06-15 RX ADMIN — PANTOPRAZOLE SODIUM 40 MG: 40 TABLET, DELAYED RELEASE ORAL at 06:37

## 2023-06-15 RX ADMIN — PRAVASTATIN SODIUM 80 MG: 40 TABLET ORAL at 08:10

## 2023-06-15 RX ADMIN — RANOLAZINE 500 MG: 500 TABLET, FILM COATED, EXTENDED RELEASE ORAL at 12:23

## 2023-06-15 NOTE — PROGRESS NOTES
CLINICAL PHARMACY NOTE: MEDS TO BEDS    Total # of Prescriptions Filled: 1   The following medications were delivered to the patient:  Ranolazine  mg    Additional Documentation:  Power Davidson RN picked up from Outpatient Pharmacy=signed  Shaun Sanchez CPhT

## 2023-06-15 NOTE — PROGRESS NOTES
Data- discharge order received, pt verbalized agreement to discharge, disposition to previous residence, no needs for HHC/DME. Action- discharge instructions prepared and given to pt, pt verbalized understanding. Medication information packet given r/t NEW and/or CHANGED prescriptions emphasizing name/purpose/side effects, pt verbalized understanding. Discharge instruction summary: Diet- as tolerated, Activity- as tolerated, Primary Care Physician as followsGonzalochad Irvin [phone] f/u appointment needs to be scheduled with PCP, appt scheduled with cardiology, immunizations reviewed, prescription medications filled with [facility]. Inpatient surgical procedure precautions reviewed: handout given. CHF Education reviewed. Pt/ Family has had a total of 60 minutes CHF education this admission encounter. 1. WEIGHT: Admit Weight - Scale: 154 lb 6.4 oz (70 kg) (06/14/23 1905)        Today  Weight - Scale: 154 lb 5.2 oz (70 kg) (06/15/23 0651)       2. O2 SAT.: SpO2: 97 % (06/15/23 0730)    Response- Pt belongings gathered, IV removed. Disposition is home (no HHC/DME needs), transported with belongings, taken to lobby via w/c w/ staff, no complications.

## 2023-06-15 NOTE — PLAN OF CARE
Problem: Discharge Planning  Goal: Discharge to home or other facility with appropriate resources  Outcome: Progressing     Problem: ABCDS Injury Assessment  Goal: Absence of physical injury  Outcome: Progressing     Problem: Cardiovascular - Adult  Goal: Maintains optimal cardiac output and hemodynamic stability  Outcome: Progressing  Flowsheets (Taken 6/15/2023 0005)  Maintains optimal cardiac output and hemodynamic stability:   Monitor blood pressure and heart rate   Assess for signs of decreased cardiac output  Note: Troponin negative x3. Pt denies chest pain and \"fluttering feeling\" at this time. Cardiology consult in am. Npo at midnight. Telemetry monitoring. Will continue to monitor.    Goal: Absence of cardiac dysrhythmias or at baseline  Outcome: Progressing  Flowsheets (Taken 6/15/2023 0005)  Absence of cardiac dysrhythmias or at baseline:   Monitor cardiac rate and rhythm   Assess for signs of decreased cardiac output

## 2023-06-15 NOTE — PROGRESS NOTES
V2.0    [facility] [place]      Name:  Alyssa Palencia /Age/Sex: 1950  (75 y.o. female)   MRN & CSN:  [id number] & [id number] Encounter Date/Time: 6/15/2023 2:28 PM EDT   Location:  [id number] PCP: Jennifer Herrera MD       Hospital Day: 2    Assessment and Recommendations   Alyssa Palencia is a 75 y.o. female with pmh of  who presents with Chest pain      Plan:   Chest pain with palpiation  - resting  -no Chest pain right now  Telemetry no acute findign   Await cardiology recs          Diet ADULT DIET; Regular   DVT Prophylaxis [] Lovenox, []  Heparin, [] SCDs, [] Ambulation,  [] Eliquis, [] Xarelto  [] Coumadin   Code Status Full Code   Disposition Pending cardiology eval    Surrogate Decision Maker/ POA       Personally reviewed Lab Studies and Imaging         Subjective:     Chief Complaint:     Alyssa Palencia is a 75 y.o. female who presents with cp   No further chest apin       Review of Systems:      Pertinent positives and negatives discussed in HPI    Objective: Intake/Output Summary (Last 24 hours) at 6/15/2023 1428  Last data filed at 6/15/2023 1227  Gross per 24 hour   Intake 480 ml   Output --   Net 480 ml      Vitals:   Vitals:    06/15/23 0651 06/15/23 0730 06/15/23 1145 06/15/23 1227   BP:  136/65 125/63    Pulse:  80 72 78   Resp:  18 18    Temp:  97.3 °F (36.3 °C) 97.8 °F (36.6 °C)    TempSrc:  Temporal Temporal    SpO2:  97%     Weight: 154 lb 5.2 oz (70 kg)      Height:             Physical Exam:      General: NAD  Eyes: EOMI  ENT: neck supple  Cardiovascular: Regular rate. Respiratory: Clear to auscultation  Gastrointestinal: Soft, non tender  Genitourinary: no suprapubic tenderness  Musculoskeletal: No edema  Skin: warm, dry  Neuro: Alert. Psych: Mood appropriate.          Medications:   Medications:    ranolazine  500 mg Oral BID    amLODIPine  5 mg Oral Daily    aspirin  81 mg Oral Daily    carvedilol  25 mg Oral BID WC    clopidogrel  75 mg Oral

## 2023-06-15 NOTE — CONSULTS
Diagnosis    Coronary artery disease involving native coronary artery of native heart without angina pectoris    HTN (hypertension)    Hyperlipemia    Ischemic cardiomyopathy    Tobacco abuse    Chest pain    Unstable angina ([address]         Plan:    I had the opportunity to review the clinical symptoms and presentation of Alyssa Palencia. Assessment/Plan:  Principal Problem:    Chest pain  Plan: atypical in nature. Probably not cardiac in origin  HS trop WDL  EKG stable   [facility] 7/7/21 patent LAD stent. Consider  PCI to RCA in the future. Would discuss at next outpatient visit if symptoms persists. Cont imdur   Start Ranexa for refractory angina. OK to discharge home. I will address the patient's cardiac risk factors and adjusted pharmacologic treatment as needed. In addition, I have reinforced the need for patient directed risk factor modification. Tobacco use was discussed with the patient and educated on the negative effects. I have asked the patient to not utilize these agents. Thank you for allowing to us to participate in the care or Alyssa Palencia. Further evaluation will be based upon the patient's clinical course and testing results. All questions and concerns were addressed to the patient/family. Alternatives to my treatment were discussed. The note was completed using EMR. Every effort was made to ensure accuracy; however, inadvertent computerized transcription errors may be present.     Ana Rod RN,  6/15/2023 8:01 AM

## 2023-06-16 LAB
EKG ATRIAL RATE: 72 BPM
EKG DIAGNOSIS: NORMAL
EKG P AXIS: 64 DEGREES
EKG P-R INTERVAL: 178 MS
EKG Q-T INTERVAL: 430 MS
EKG QRS DURATION: 142 MS
EKG QTC CALCULATION (BAZETT): 470 MS
EKG R AXIS: -27 DEGREES
EKG T AXIS: 51 DEGREES
EKG VENTRICULAR RATE: 72 BPM

## 2023-06-16 PROCEDURE — 93010 ELECTROCARDIOGRAM REPORT: CPT | Performed by: INTERNAL MEDICINE

## 2023-06-19 NOTE — DISCHARGE SUMMARY
[facility] DISCHARGE SUMMARY    Patient Demographics    Patient. Genna Ahmadi  Date of Birth. 1950  MRN. [id number]     Primary care provider. Alessia Cho  (Tel: [phone])    Admit date: 6/14/2023    Discharge date (blank if same as Note Date): 6/15/2023  Note Date: 6/19/2023     Reason for Hospitalization. Chief Complaint   Patient presents with    Chest Pain     Patient states fluttering CP all weekend, intermittent, none at this time, does endorse pain to [address] [place]. Chest pain  Plan: atypical in nature. Probably not cardiac in origin  EKG stable . Cont imdur   Start Ranexa for refractory angina  Dcd stable   Consults. IP CONSULT TO CARDIOLOGY  IP CONSULT TO FINANCIAL COUNSELOR    Physical examination on discharge day. /63   Pulse 78   Temp 97.8 °F (36.6 °C) (Temporal)   Resp 18   Ht 5' 1\" (1.549 m)   Wt 154 lb 5.2 oz (70 kg)   SpO2 97%   BMI 29.16 kg/m²   General appearance. Alert. Looks comfortable. HEENT. Sclera clear. Moist mucus membranes. Cardiovascular. Regular rate and rhythm, normal S1, S2. No murmur. Respiratory. Not using accessory muscles. Clear to auscultation bilaterally, no wheeze. Gastrointestinal. Abdomen soft, non-tender, not distended, normal bowel sounds  Neurology. Facial symmetry. No speech deficits. Moving all extremities equally. Extremities. No edema in lower extremities. Skin. Warm, dry, normal turgor    Condition at time of discharge stable     Medication instructions provided to patient at discharge.      Medication List        START taking these medications      ranolazine 500 MG extended release tablet  Commonly known as: RANEXA  Take 1 tablet by mouth 2 times daily  Notes to patient: Use: to treat a type of long-term chest pain (stable angina) in some people  Side

## 2023-07-19 ENCOUNTER — OFFICE VISIT (OUTPATIENT)
Dept: CARDIOLOGY CLINIC | Age: 73
End: 2023-07-19

## 2023-07-19 VITALS
BODY MASS INDEX: 27.1 KG/M2 | WEIGHT: 147.3 LBS | HEART RATE: 82 BPM | OXYGEN SATURATION: 96 % | DIASTOLIC BLOOD PRESSURE: 62 MMHG | HEIGHT: 62 IN | SYSTOLIC BLOOD PRESSURE: 142 MMHG

## 2023-07-19 DIAGNOSIS — I25.10 CORONARY ARTERY DISEASE INVOLVING NATIVE CORONARY ARTERY OF NATIVE HEART WITHOUT ANGINA PECTORIS: Primary | ICD-10-CM

## 2023-07-19 DIAGNOSIS — I25.5 ISCHEMIC CARDIOMYOPATHY: ICD-10-CM

## 2023-07-19 DIAGNOSIS — E78.2 MIXED HYPERLIPIDEMIA: ICD-10-CM

## 2023-07-19 DIAGNOSIS — I10 PRIMARY HYPERTENSION: ICD-10-CM

## 2023-07-19 RX ORDER — CLOPIDOGREL BISULFATE 75 MG/1
75 TABLET ORAL EVERY EVENING
COMMUNITY

## 2023-07-19 RX ORDER — LISINOPRIL 40 MG/1
40 TABLET ORAL EVERY EVENING
COMMUNITY

## 2023-07-19 RX ORDER — DOCUSATE SODIUM 100 MG/1
100 CAPSULE, LIQUID FILLED ORAL 2 TIMES DAILY PRN
COMMUNITY

## 2023-07-19 RX ORDER — PRAVASTATIN SODIUM 80 MG/1
80 TABLET ORAL EVERY EVENING
COMMUNITY

## 2023-07-19 NOTE — PROGRESS NOTES
Social History     Tobacco Use   Smoking Status Every Day    Packs/day: 0.50    Years: 50.00    Pack years: 25.00    Types: Cigarettes   Smokeless Tobacco Never     Current Medications:  Current Outpatient Medications   Medication Sig Dispense Refill    clopidogrel (PLAVIX) 75 MG tablet Take 1 tablet by mouth every evening      lisinopril (PRINIVIL;ZESTRIL) 40 MG tablet Take 1 tablet by mouth every evening      pravastatin (PRAVACHOL) 80 MG tablet Take 1 tablet by mouth every evening      Ferrous Gluconate-C-Folic Acid (IRON-C PO) Take 1 tablet by mouth daily      docusate sodium (COLACE) 100 MG capsule Take 1 capsule by mouth 2 times daily as needed for Constipation      ranolazine (RANEXA) 500 MG extended release tablet Take 1 tablet by mouth 2 times daily 60 tablet 3    carvedilol (COREG) 25 MG tablet TAKE 1 TABLET BY MOUTH TWICE DAILY WITH MEALS 180 tablet 3    isosorbide mononitrate (IMDUR) 60 MG extended release tablet Take 1 tablet by mouth in the morning and at bedtime 180 tablet 3    pantoprazole (PROTONIX) 40 MG tablet Take 1 tablet by mouth every morning (before breakfast) 30 tablet 3    aspirin 81 MG chewable tablet Take 1 tablet by mouth daily      amLODIPine (NORVASC) 5 MG tablet Take 1 tablet by mouth daily      nitroGLYCERIN (NITROSTAT) 0.4 MG SL tablet Place 1 tablet under the tongue every 5 minutes as needed for Chest pain (Patient not taking: Reported on 7/19/2023) 25 tablet 1     No current facility-administered medications for this visit. REVIEW OF SYSTEMS:   CONSTITUTIONAL: No major weight gain or loss, fatigue, weakness, night sweats or fever. There's been no change in energy level, sleep pattern, or activity level. HEENT: No new vision difficulties or ringing in the ears. RESPIRATORY: No new SOB, PND, orthopnea or cough. CARDIOVASCULAR: See HPI  GI: No nausea, vomiting, diarrhea, constipation, abdominal pain or changes in bowel habits.   : No urinary frequency, urgency,

## 2023-10-10 ENCOUNTER — TELEPHONE (OUTPATIENT)
Dept: CARDIOLOGY CLINIC | Age: 73
End: 2023-10-10

## 2023-10-10 NOTE — TELEPHONE ENCOUNTER
CARDIAC CLEARANCE     What type of procedure are you having? Colonoscopy   Which physician is performing your procedure? Dr. Ch Life  When is your procedure scheduled for?  10/17/23  Where are you having this procedure? [facility]  Are you taking Blood Thinners? If so what? (Name/dose/frequesncy)  Yes, clopidogrel (PLAVIX) 75 MG, 1 daily     Does the surgeon want you to stop your blood thinner? If so for how long?   Pt states she was told to ask if needs to be held and how long  Phone Number and Contact Name for Physicians office:  [phone]  Fax number to send information:

## 2023-10-10 NOTE — PROGRESS NOTES
Patient reached ___X_ yes  _____ no   VM instructions left ____ yes   phone number ________                                ____ no-office notified          Date __10/17/23_______  Time ___1135____  Arrival ___1000  hosp-endo___    Nothing to eat or drink after midnight-follow your doctors prep instructions-this may include taking a second dose of your prep after midnight  Responsible adult 25 or older to stay on site while you are here-drive you home-stay with you after  Follow any instructions your doctors office has given you  Bring a complete list of all your medications and supplements including name,dose,how often taken the day of your procedure  If you normally take the following medications in the morning please do so the AM of your procedure with a small sip of water       Heart,blood pressure,seizure,thyroid or breathing medications-use your inhalers-bring any rescue inhalers with you DOS       DO NOT take blood pressure medications ending in \"renay\" or \"pril\" the AM of procedure or evening prior-DO NOT TAKE LISINOPRIL  Dr Michael Jones patients are not to take any medications the AM of surgery  Take half or your normal dose of any long acting insulins the night before your procedure-do not take any diabetic medications the AM of procedure  Follow your doctors instructions regarding stopping or taking  any blood thinners-if you do not have instructions-call them-CHECK PLAVIX  Any questions call your doctor  Other ____take imdur, ranexa, coreg, amlodipine am of procedure__________________________________________________________      [facility] POLICY(subject to change)             The current policy is 2 visitors per patient. There are no children allowed. Mask at discretion of facility. Visiting hours are 8a-8p. Overnight visitors will be at the discretion of the nurse. All policies are subject to change.

## 2023-10-16 NOTE — TELEPHONE ENCOUNTER
Spoke with pt.  She's had no CP/SOB  She's actually scheduled for the test tomorrow and has been holding DAPT

## 2023-10-17 ENCOUNTER — HOSPITAL ENCOUNTER (OUTPATIENT)
Age: 73
Setting detail: OUTPATIENT SURGERY
Discharge: HOME | End: 2023-10-17
Attending: SURGERY | Admitting: SURGERY
Payer: MEDICARE

## 2023-10-17 ENCOUNTER — ANESTHESIA (OUTPATIENT)
Dept: ENDOSCOPY | Age: 73
End: 2023-10-17
Payer: MEDICARE

## 2023-10-17 ENCOUNTER — ANESTHESIA EVENT (OUTPATIENT)
Dept: ENDOSCOPY | Age: 73
End: 2023-10-17
Payer: MEDICARE

## 2023-10-17 VITALS
OXYGEN SATURATION: 98 % | HEART RATE: 82 BPM | HEIGHT: 61 IN | DIASTOLIC BLOOD PRESSURE: 66 MMHG | WEIGHT: 150 LBS | SYSTOLIC BLOOD PRESSURE: 139 MMHG | BODY MASS INDEX: 28.32 KG/M2 | RESPIRATION RATE: 20 BRPM | TEMPERATURE: 97.2 F

## 2023-10-17 DIAGNOSIS — Z12.11 COLON CANCER SCREENING: ICD-10-CM

## 2023-10-17 PROCEDURE — 3700000001 HC ADD 15 MINUTES (ANESTHESIA): Performed by: SURGERY

## 2023-10-17 PROCEDURE — 7100000000 HC PACU RECOVERY - FIRST 15 MIN: Performed by: SURGERY

## 2023-10-17 PROCEDURE — 2580000003 HC RX 258: Performed by: SURGERY

## 2023-10-17 PROCEDURE — 3609010600 HC COLONOSCOPY POLYPECTOMY SNARE/COLD BIOPSY: Performed by: SURGERY

## 2023-10-17 PROCEDURE — 88305 TISSUE EXAM BY PATHOLOGIST: CPT

## 2023-10-17 PROCEDURE — 7100000001 HC PACU RECOVERY - ADDTL 15 MIN: Performed by: SURGERY

## 2023-10-17 PROCEDURE — 3700000000 HC ANESTHESIA ATTENDED CARE: Performed by: SURGERY

## 2023-10-17 PROCEDURE — 7100000011 HC PHASE II RECOVERY - ADDTL 15 MIN: Performed by: SURGERY

## 2023-10-17 PROCEDURE — 2709999900 HC NON-CHARGEABLE SUPPLY: Performed by: SURGERY

## 2023-10-17 PROCEDURE — 6370000000 HC RX 637 (ALT 250 FOR IP): Performed by: STUDENT IN AN ORGANIZED HEALTH CARE EDUCATION/TRAINING PROGRAM

## 2023-10-17 PROCEDURE — 6360000002 HC RX W HCPCS: Performed by: NURSE ANESTHETIST, CERTIFIED REGISTERED

## 2023-10-17 PROCEDURE — 7100000010 HC PHASE II RECOVERY - FIRST 15 MIN: Performed by: SURGERY

## 2023-10-17 RX ORDER — IPRATROPIUM BROMIDE AND ALBUTEROL SULFATE 2.5; .5 MG/3ML; MG/3ML
1 SOLUTION RESPIRATORY (INHALATION)
Status: DISCONTINUED | OUTPATIENT
Start: 2023-10-17 | End: 2023-10-17 | Stop reason: HOSPADM

## 2023-10-17 RX ORDER — SODIUM CHLORIDE 9 MG/ML
INJECTION, SOLUTION INTRAVENOUS CONTINUOUS
Status: DISCONTINUED | OUTPATIENT
Start: 2023-10-17 | End: 2023-10-17 | Stop reason: HOSPADM

## 2023-10-17 RX ORDER — PROPOFOL 10 MG/ML
INJECTION, EMULSION INTRAVENOUS PRN
Status: DISCONTINUED | OUTPATIENT
Start: 2023-10-17 | End: 2023-10-17 | Stop reason: SDUPTHER

## 2023-10-17 RX ADMIN — SODIUM CHLORIDE: 9 INJECTION, SOLUTION INTRAVENOUS at 11:11

## 2023-10-17 RX ADMIN — PROPOFOL 100 MCG/KG/MIN: 10 INJECTION, EMULSION INTRAVENOUS at 12:13

## 2023-10-17 RX ADMIN — SODIUM CHLORIDE: 9 INJECTION, SOLUTION INTRAVENOUS at 12:03

## 2023-10-17 RX ADMIN — PROPOFOL 50 MG: 10 INJECTION, EMULSION INTRAVENOUS at 12:12

## 2023-10-17 RX ADMIN — IPRATROPIUM BROMIDE AND ALBUTEROL SULFATE 1 DOSE: .5; 3 SOLUTION RESPIRATORY (INHALATION) at 11:11

## 2023-10-17 ASSESSMENT — PAIN - FUNCTIONAL ASSESSMENT: PAIN_FUNCTIONAL_ASSESSMENT: NONE - DENIES PAIN

## 2023-10-17 ASSESSMENT — LIFESTYLE VARIABLES: SMOKING_STATUS: 1

## 2023-10-17 NOTE — PROGRESS NOTES
Discharge instructions review with patient and son, Barbara Eisenberg. All home medications have been reviewed, pt v/u. Discharge instructions signed. Pt discharged via wheelchair. Pt discharged with all belongings. Son taking stable pt home.

## 2023-10-17 NOTE — ANESTHESIA POSTPROCEDURE EVALUATION
Department of Anesthesiology  Postprocedure Note    Patient: Dylon Villalobos  MRN: [id number]  YOB: 1950  Date of evaluation: 10/17/2023      Procedure Summary     Date: 10/17/23 Room / Location: [address] [place] [address]    Anesthesia Start: 1461 Anesthesia Stop: 4783    Procedure: COLONOSCOPY POLYPECTOMY SNARE/COLD BIOPSY Diagnosis:       Colon cancer screening      (Colon cancer screening [Z12.11])    Surgeons: Nola Hernandez MD Responsible Provider: Danuta Webber MD    Anesthesia Type: MAC ASA Status: 3          Anesthesia Type: No value filed.     Yesenia Phase I: Yesenia Score: 10    Yesenia Phase II: Yesenia Score: 10      Anesthesia Post Evaluation    Patient location during evaluation: bedside  Patient participation: complete - patient participated  Level of consciousness: awake and alert  Pain score: 1  Airway patency: patent  Nausea & Vomiting: no vomiting  Complications: no  Cardiovascular status: hemodynamically stable  Respiratory status: nonlabored ventilation  Hydration status: stable  Multimodal analgesia pain management approach  Pain management: adequate

## 2023-10-17 NOTE — PROGRESS NOTES
Patient arrived from endo to pacu. Arouses to voice. On room air. NSR on the monitor. VSS. Abdomen soft.

## 2023-10-17 NOTE — PROGRESS NOTES
Patient's awake and alert. On room air. NSR on the monitor. VSS. Abdomen soft, denies pain. Tolerating PO. Patient meets criteria to be discharged from phase 1.  Will prepare for discharge home in phase 2

## 2023-10-17 NOTE — OP NOTE
Patient: Dylon Villalobos  YOB: 1950  MRN: [id number]  [facility]:  [id number]  Provider:  Nola Hernandez MD    Date of Procedure: 10/17/2023    Pre-Op Diagnosis: History of colon polyps    Post-Op Diagnosis: Transverse colon polyp and diverticulosis of the colon      Procedure colonoscopy up to cecum with snare polypectomy transverse colon    Surgeon(s):  Nola Hernandez MD    Anesthesia: Monitor Anesthesia Care    Estimated Blood Loss (mL): Minimal    Detailed Description of Procedure: The patient was placed in the left lateral position. Olympus colonoscope was inserted all the way up to the cecum. Cecum ileocecal valve was within normal limits. He had 1.3 cm polyp in the proximal transverse colon which was removed by electrocautery snare. No other polyps were seen. No cancer was seen. She had diverticulosis of colon. Her prep was moderate. Patient tolerated the procedure well and left the endoscopy room in a satisfactory condition.     Electronically signed by Nola Hernandez MD on 10/17/2023 at 12:34 PM

## 2023-10-17 NOTE — PROGRESS NOTES
Reviewed patient's medical and surgical history in electronic record and with patient at the bedside. All questions regarding procedure answered. Scope verified using 2 person system. Family in waiting room.   Electronically signed by Ben Brown RN on 10/17/2023 at 12:12 PM

## 2023-10-18 ENCOUNTER — TELEPHONE (OUTPATIENT)
Dept: CARDIOLOGY CLINIC | Age: 73
End: 2023-10-18

## 2023-11-13 ENCOUNTER — OFFICE VISIT (OUTPATIENT)
Dept: CARDIOLOGY CLINIC | Age: 73
End: 2023-11-13
Payer: MEDICARE

## 2023-11-13 VITALS
WEIGHT: 145 LBS | DIASTOLIC BLOOD PRESSURE: 52 MMHG | HEART RATE: 70 BPM | SYSTOLIC BLOOD PRESSURE: 102 MMHG | HEIGHT: 61 IN | OXYGEN SATURATION: 94 % | BODY MASS INDEX: 27.38 KG/M2

## 2023-11-13 DIAGNOSIS — I25.5 ISCHEMIC CARDIOMYOPATHY: ICD-10-CM

## 2023-11-13 DIAGNOSIS — I25.10 CORONARY ARTERY DISEASE INVOLVING NATIVE CORONARY ARTERY OF NATIVE HEART WITHOUT ANGINA PECTORIS: Primary | ICD-10-CM

## 2023-11-13 DIAGNOSIS — E78.2 MIXED HYPERLIPIDEMIA: ICD-10-CM

## 2023-11-13 DIAGNOSIS — I10 PRIMARY HYPERTENSION: ICD-10-CM

## 2023-11-13 PROCEDURE — G8400 PT W/DXA NO RESULTS DOC: HCPCS | Performed by: NURSE PRACTITIONER

## 2023-11-13 PROCEDURE — G8484 FLU IMMUNIZE NO ADMIN: HCPCS | Performed by: NURSE PRACTITIONER

## 2023-11-13 PROCEDURE — 3017F COLORECTAL CA SCREEN DOC REV: CPT | Performed by: NURSE PRACTITIONER

## 2023-11-13 PROCEDURE — 1123F ACP DISCUSS/DSCN MKR DOCD: CPT | Performed by: NURSE PRACTITIONER

## 2023-11-13 PROCEDURE — 1090F PRES/ABSN URINE INCON ASSESS: CPT | Performed by: NURSE PRACTITIONER

## 2023-11-13 PROCEDURE — 3078F DIAST BP <80 MM HG: CPT | Performed by: NURSE PRACTITIONER

## 2023-11-13 PROCEDURE — 99214 OFFICE O/P EST MOD 30 MIN: CPT | Performed by: NURSE PRACTITIONER

## 2023-11-13 PROCEDURE — 3074F SYST BP LT 130 MM HG: CPT | Performed by: NURSE PRACTITIONER

## 2023-11-13 PROCEDURE — G8427 DOCREV CUR MEDS BY ELIG CLIN: HCPCS | Performed by: NURSE PRACTITIONER

## 2023-11-13 PROCEDURE — G8419 CALC BMI OUT NRM PARAM NOF/U: HCPCS | Performed by: NURSE PRACTITIONER

## 2023-11-13 PROCEDURE — 4004F PT TOBACCO SCREEN RCVD TLK: CPT | Performed by: NURSE PRACTITIONER

## 2023-11-13 RX ORDER — LISINOPRIL 40 MG/1
20 TABLET ORAL DAILY
Qty: 30 TABLET | Refills: 5 | Status: SHIPPED
Start: 2023-11-13

## 2023-11-13 NOTE — PATIENT INSTRUCTIONS
Decrease lisinopril to 1/2 tablet = 20 mg daily    Check your BP once a week and call me after 4 weeks with your readings and if your fatigue is better    Appt in 6 months with Dr. Daralyn Goldmann

## 2023-11-13 NOTE — PROGRESS NOTES
patient. Recommend maintaining a smoke-free lifestyle. Products available for smoking cessation were discussed     Patient is on a beta-blocker  Patient is on an ace-i/ARB  Patient is on a statin    Dual Antiplatelet therapy / anti-coagulation has been recommended / prescribed for this patient. Education conducted on adverse reactions including bleeding was discussed. Angiotension inhibitor/angiotension receptor blocker has been prescribed / recommended for congestive heart failure. Daily weight, low sodium diet were discussed. Patient instructed to call the office with a weight gain: > 3 # over night or 5# in one week; swelling, SOB/orthopnea/PND    The patient verbalizes understanding not to stop medications without discussing with us. Discussed exercise: 30-60 minutes 7 days/week ; no routine ; grocery shopping is about it  Discussed Low saturated fat diet. Thank you for allowing to us to participate in the care of Felisa Reina.     ROMEO Helton    Documentation of today's visit sent to PCP

## 2024-01-14 ENCOUNTER — HOSPITAL ENCOUNTER (EMERGENCY)
Age: 74
Discharge: HOME | End: 2024-01-14
Attending: EMERGENCY MEDICINE
Payer: MEDICARE

## 2024-01-14 ENCOUNTER — APPOINTMENT (OUTPATIENT)
Dept: GENERAL RADIOLOGY | Age: 74
End: 2024-01-14
Payer: MEDICARE

## 2024-01-14 ENCOUNTER — APPOINTMENT (OUTPATIENT)
Dept: CT IMAGING | Age: 74
End: 2024-01-14
Payer: MEDICARE

## 2024-01-14 VITALS
WEIGHT: 145 LBS | DIASTOLIC BLOOD PRESSURE: 61 MMHG | SYSTOLIC BLOOD PRESSURE: 106 MMHG | HEART RATE: 72 BPM | TEMPERATURE: 97.4 F | RESPIRATION RATE: 26 BRPM | BODY MASS INDEX: 27.4 KG/M2 | OXYGEN SATURATION: 91 %

## 2024-01-14 DIAGNOSIS — R00.2 PALPITATIONS: Primary | ICD-10-CM

## 2024-01-14 DIAGNOSIS — R07.2 PRECORDIAL CHEST PAIN: ICD-10-CM

## 2024-01-14 LAB
ALBUMIN SERPL-MCNC: 4.5 G/DL (ref 3.4–5)
ALBUMIN/GLOB SERPL: 1.6 {RATIO} (ref 1.1–2.2)
ALP SERPL-CCNC: 73 U/L (ref 40–129)
ALT SERPL-CCNC: 10 U/L (ref 10–40)
ANION GAP SERPL CALCULATED.3IONS-SCNC: 11 MMOL/L (ref 3–16)
AST SERPL-CCNC: 21 U/L (ref 15–37)
BASOPHILS # BLD: 0 K/UL (ref 0–0.2)
BASOPHILS NFR BLD: 0.6 %
BILIRUB SERPL-MCNC: 0.3 MG/DL (ref 0–1)
BUN SERPL-MCNC: 10 MG/DL (ref 7–20)
CALCIUM SERPL-MCNC: 9.6 MG/DL (ref 8.3–10.6)
CHLORIDE SERPL-SCNC: 103 MMOL/L (ref 99–110)
CO2 SERPL-SCNC: 28 MMOL/L (ref 21–32)
CREAT SERPL-MCNC: 0.7 MG/DL (ref 0.6–1.2)
D DIMER: 0.88 UG/ML FEU (ref 0–0.6)
DEPRECATED RDW RBC AUTO: 13.8 % (ref 12.4–15.4)
EOSINOPHIL # BLD: 0.3 K/UL (ref 0–0.6)
EOSINOPHIL NFR BLD: 5.7 %
GFR SERPLBLD CREATININE-BSD FMLA CKD-EPI: >60 ML/MIN/{1.73_M2}
GLUCOSE SERPL-MCNC: 107 MG/DL (ref 70–99)
HCT VFR BLD AUTO: 43 % (ref 36–48)
HGB BLD-MCNC: 14.1 G/DL (ref 12–16)
LIPASE SERPL-CCNC: 28 U/L (ref 13–60)
LYMPHOCYTES # BLD: 1.3 K/UL (ref 1–5.1)
LYMPHOCYTES NFR BLD: 22.5 %
MCH RBC QN AUTO: 30 PG (ref 26–34)
MCHC RBC AUTO-ENTMCNC: 32.7 G/DL (ref 31–36)
MCV RBC AUTO: 91.8 FL (ref 80–100)
MONOCYTES # BLD: 0.7 K/UL (ref 0–1.3)
MONOCYTES NFR BLD: 11.7 %
NEUTROPHILS # BLD: 3.4 K/UL (ref 1.7–7.7)
NEUTROPHILS NFR BLD: 59.5 %
NT-PROBNP SERPL-MCNC: 352 PG/ML (ref 0–449)
PLATELET # BLD AUTO: 206 K/UL (ref 135–450)
PMV BLD AUTO: 8 FL (ref 5–10.5)
POTASSIUM SERPL-SCNC: 4.8 MMOL/L (ref 3.5–5.1)
PROT SERPL-MCNC: 7.4 G/DL (ref 6.4–8.2)
RBC # BLD AUTO: 4.68 M/UL (ref 4–5.2)
SODIUM SERPL-SCNC: 142 MMOL/L (ref 136–145)
TROPONIN, HIGH SENSITIVITY: 8 NG/L (ref 0–14)
TROPONIN, HIGH SENSITIVITY: 8 NG/L (ref 0–14)
WBC # BLD AUTO: 5.6 K/UL (ref 4–11)

## 2024-01-14 PROCEDURE — 85379 FIBRIN DEGRADATION QUANT: CPT

## 2024-01-14 PROCEDURE — 6360000004 HC RX CONTRAST MEDICATION: Performed by: EMERGENCY MEDICINE

## 2024-01-14 PROCEDURE — 80053 COMPREHEN METABOLIC PANEL: CPT

## 2024-01-14 PROCEDURE — 83690 ASSAY OF LIPASE: CPT

## 2024-01-14 PROCEDURE — 71045 X-RAY EXAM CHEST 1 VIEW: CPT

## 2024-01-14 PROCEDURE — 6370000000 HC RX 637 (ALT 250 FOR IP): Performed by: PHYSICIAN ASSISTANT

## 2024-01-14 PROCEDURE — 84484 ASSAY OF TROPONIN QUANT: CPT

## 2024-01-14 PROCEDURE — 99285 EMERGENCY DEPT VISIT HI MDM: CPT

## 2024-01-14 PROCEDURE — 71260 CT THORAX DX C+: CPT

## 2024-01-14 PROCEDURE — 93005 ELECTROCARDIOGRAM TRACING: CPT | Performed by: EMERGENCY MEDICINE

## 2024-01-14 PROCEDURE — 83880 ASSAY OF NATRIURETIC PEPTIDE: CPT

## 2024-01-14 PROCEDURE — 85025 COMPLETE CBC W/AUTO DIFF WBC: CPT

## 2024-01-14 RX ORDER — ASPIRIN 81 MG/1
324 TABLET, CHEWABLE ORAL ONCE
Status: COMPLETED | OUTPATIENT
Start: 2024-01-14 | End: 2024-01-14

## 2024-01-14 RX ADMIN — IOPAMIDOL 75 ML: 755 INJECTION, SOLUTION INTRAVENOUS at 06:44

## 2024-01-14 RX ADMIN — ASPIRIN 81 MG 324 MG: 81 TABLET ORAL at 03:45

## 2024-01-14 ASSESSMENT — PAIN SCALES - GENERAL: PAINLEVEL_OUTOF10: 5

## 2024-01-14 ASSESSMENT — PAIN - FUNCTIONAL ASSESSMENT: PAIN_FUNCTIONAL_ASSESSMENT: 0-10

## 2024-01-14 NOTE — ED NOTES
IV Removed. Reviewed discharge instructions, home meds, and follow up care with patient, verbalized understanding.

## 2024-01-14 NOTE — ED PROVIDER NOTES
patient can be safely discharged home to follow-up closely with her doctor.    Consults:  None    History obtained from:   Patient    Pertinent social determinants of health:   None applicable    Review of other records:  None    Medications given:  Medications   aspirin chewable tablet 324 mg (324 mg Oral Given 1/14/24 9685)   iopamidol (ISOVUE-370) 76 % injection 75 mL (75 mLs IntraVENous Given 1/14/24 7068)        Hospitalization considered?:  Considered, pending CT    Additional testing considered?:  None    Sepsis present?:  No    Discharge Medications:  Discharge Medication List as of 1/14/2024  7:21 AM          PROCEDURES  None    CLINICAL IMPRESSION  1. Palpitations    2. Precordial chest pain        DISPOSITION  Angela Ahmadi is pending CT and reevaluation.    CRITICAL CARE TIME  None        I have personally seen and examined this patient. I have fully participated in the care of this patient and I have reviewed and agree with all pertinent clinical information including history, physical exam, and plan. I have also reviewed and agree with the medications, allergies and past medical history section for this patient.    Electronically signed by: Zana Cabral Jr., , FACEP, FAAEM, 1/15/2024  1:46 PM        Zana Cabral,   01/15/24 0324

## 2024-01-15 LAB
EKG ATRIAL RATE: 73 BPM
EKG DIAGNOSIS: NORMAL
EKG P AXIS: 69 DEGREES
EKG P-R INTERVAL: 174 MS
EKG Q-T INTERVAL: 434 MS
EKG QRS DURATION: 144 MS
EKG QTC CALCULATION (BAZETT): 478 MS
EKG R AXIS: -37 DEGREES
EKG T AXIS: 67 DEGREES
EKG VENTRICULAR RATE: 73 BPM

## 2024-01-15 PROCEDURE — 93010 ELECTROCARDIOGRAM REPORT: CPT | Performed by: INTERNAL MEDICINE

## 2024-01-25 ENCOUNTER — OFFICE VISIT (OUTPATIENT)
Dept: CARDIOLOGY CLINIC | Age: 74
End: 2024-01-25
Payer: MEDICARE

## 2024-01-25 VITALS
DIASTOLIC BLOOD PRESSURE: 62 MMHG | HEIGHT: 61 IN | WEIGHT: 145.6 LBS | HEART RATE: 78 BPM | BODY MASS INDEX: 27.49 KG/M2 | OXYGEN SATURATION: 95 % | SYSTOLIC BLOOD PRESSURE: 110 MMHG

## 2024-01-25 DIAGNOSIS — I10 PRIMARY HYPERTENSION: ICD-10-CM

## 2024-01-25 DIAGNOSIS — I25.10 CORONARY ARTERY DISEASE INVOLVING NATIVE CORONARY ARTERY OF NATIVE HEART WITHOUT ANGINA PECTORIS: Primary | ICD-10-CM

## 2024-01-25 DIAGNOSIS — I25.5 ISCHEMIC CARDIOMYOPATHY: ICD-10-CM

## 2024-01-25 PROCEDURE — 99214 OFFICE O/P EST MOD 30 MIN: CPT | Performed by: NURSE PRACTITIONER

## 2024-01-25 PROCEDURE — 1123F ACP DISCUSS/DSCN MKR DOCD: CPT | Performed by: NURSE PRACTITIONER

## 2024-01-25 PROCEDURE — 3078F DIAST BP <80 MM HG: CPT | Performed by: NURSE PRACTITIONER

## 2024-01-25 PROCEDURE — G8427 DOCREV CUR MEDS BY ELIG CLIN: HCPCS | Performed by: NURSE PRACTITIONER

## 2024-01-25 PROCEDURE — 1090F PRES/ABSN URINE INCON ASSESS: CPT | Performed by: NURSE PRACTITIONER

## 2024-01-25 PROCEDURE — 4004F PT TOBACCO SCREEN RCVD TLK: CPT | Performed by: NURSE PRACTITIONER

## 2024-01-25 PROCEDURE — G8484 FLU IMMUNIZE NO ADMIN: HCPCS | Performed by: NURSE PRACTITIONER

## 2024-01-25 PROCEDURE — 3017F COLORECTAL CA SCREEN DOC REV: CPT | Performed by: NURSE PRACTITIONER

## 2024-01-25 PROCEDURE — 3074F SYST BP LT 130 MM HG: CPT | Performed by: NURSE PRACTITIONER

## 2024-01-25 PROCEDURE — G8419 CALC BMI OUT NRM PARAM NOF/U: HCPCS | Performed by: NURSE PRACTITIONER

## 2024-01-25 PROCEDURE — G8400 PT W/DXA NO RESULTS DOC: HCPCS | Performed by: NURSE PRACTITIONER

## 2024-01-25 NOTE — PROGRESS NOTES
[facility]     Outpatient Follow Up Note    Angela Ahmadi is 73 y.o. female who presents today with a history of CAD s/p PTCA LAD '10;  RCA by cath '21, CM/EF 39% by MUGA '14; 50-55% on echo July '21, HTN and hyperlipidemia.       Interval hx: 1/14/24: ER  presents to the ED for evaluation of chest pain shortness of breath.  This has been intermittent for the last several days however last night became more constant.  It is a pressure sensation is mostly in the middle of the chest but does occasionally radiate to both shoulders.  Does not seem to be affected by exertion or deep breaths.  States she has a history of pain like this with her heart disease.  She did take aspirin at home as well as Tylenol and states that helped it but it is still there.   EKG: non specific ST T wave changes  Troponin 28  During her course here pain resolves    Her workup is fairly unremarkable with exception of a slightly elevated D-dimer. CT PE study is ordered and pending at this time. Patient sent to the oncoming physician who will follow-up on this      CHIEF COMPLAINT / HPI:  Follow Up secondary to coronary artery disease  She recalls trying to get to sleep when it happened. She was having a day or two of similar symptoms before deciding to go to the ER.    Subjective:   She can't really say what caused it since she was getting ready for bed. She had no associated symptoms. She used NTG SL once but can't say how long it lasted. She admits to only be taking Ranexa once a day since it'd been helping.    She's felt ok. She hasn't had a recurrence of discomfort since leaving the ER.  There is SOB at times. Walking in from the parking lot today it was a little SOB / slow. She felt tired like she didn't want to go. The patient denies orthopnea/PND. The patient does not have swelling. The patients weight is stable .     The patient was having palpitations earlier this month. An Event monitor was prescribed. She needs help

## 2024-02-05 ENCOUNTER — TELEPHONE (OUTPATIENT)
Dept: CARDIOLOGY CLINIC | Age: 74
End: 2024-02-05

## 2024-02-05 NOTE — TELEPHONE ENCOUNTER
The patient phoned wanting to see NPTS today.  She states that her heart is racing.  Patient is scheduled for 02/07/24 10:00 with NPTS.  Please advise if patient needs to be seen sooner.  Thank you

## 2024-02-05 NOTE — TELEPHONE ENCOUNTER
Spoke to Pt and asked if she was able to take pulse, she stated 73 HR, /86. No other symptoms other than racing feeling.     Please advise if pt needs to be seen sooner than 02/07.

## 2024-02-07 ENCOUNTER — OFFICE VISIT (OUTPATIENT)
Dept: CARDIOLOGY CLINIC | Age: 74
End: 2024-02-07

## 2024-02-07 VITALS
OXYGEN SATURATION: 98 % | BODY MASS INDEX: 27.38 KG/M2 | DIASTOLIC BLOOD PRESSURE: 62 MMHG | SYSTOLIC BLOOD PRESSURE: 92 MMHG | HEIGHT: 61 IN | WEIGHT: 145 LBS | HEART RATE: 67 BPM

## 2024-02-07 DIAGNOSIS — I10 PRIMARY HYPERTENSION: ICD-10-CM

## 2024-02-07 DIAGNOSIS — I25.5 ISCHEMIC CARDIOMYOPATHY: ICD-10-CM

## 2024-02-07 DIAGNOSIS — I25.10 CORONARY ARTERY DISEASE INVOLVING NATIVE CORONARY ARTERY OF NATIVE HEART WITHOUT ANGINA PECTORIS: ICD-10-CM

## 2024-02-07 DIAGNOSIS — R00.0 HEART RATE FAST: Primary | ICD-10-CM

## 2024-02-07 NOTE — PROGRESS NOTES
ng/L 8 8      Latest Reference Range & Units 01/14/24 03:46   Pro-BNP 0 - 449 pg/mL 352      Latest Reference Range & Units 01/14/24 05:49   D-Dimer, Quant 0.00 - 0.60 ug/mL FEU 0.88 (H)     Radiology Review:  Pertinent images / reports were reviewed as a part of this visit and reveals the following:      CT chest pul emboli: 1/14/24:  IMPRESSION:  1. Negative for pulmonary embolus.  2. Mild emphysema.    Echo: July '21  Summary   -Normal left ventricle size, wall thickness and systolic function with an   estimated ejection fraction of 50- 55%.   -The anterior wall appears akinetic.   -Indeterminate diastolic function. E/e' = 10.0.   -Aortic valve leaflets appear mildly thickened, but opens well.   -Mild tricuspid regurgitation. RVSP = 15 mmHG.    cath 7/7/21  Anatomy:   LM-nml   LAD-mid stent 10% ISR  Cx-nml  OM- nml  RCA-prox 100%   RPDA- L to R collaterals  LVEF- 30  LVG- severe anterior, anteroapical, apical hypokinesis  LVEDP- 17  Impression  ~Coronary Angiography w/ severe single vessel CAD, patent stent  ~LVG with LVEF of 30 and LAD regional wall motion abnormalities  ~Successful complex angioplasty and stenting of LAD     ~Severe branch CAD involving the diagonal, small vessel that is difficult to treat with PCI. Opt for medical therapy. If symptoms persists consider PCI to D2.    Myoview: July '21   Summary   -There is a large, severe intensity, fixed anterior, apical and septal   defect c/w scar.   -There is no ischemia.   -There is anterior, septal and apical hypokinesis with EF=38%.   -Intermediate risk study.   -Similar to 2019 study.    Assessment:      Diagnosis Orders    Heart rate fast  Event monitor : pending results  AP regular  Remains on carvedilol   TSH WNL ; K+ 4.2 ; Mg+ 2.2 ; LA dimension normal  2. Coronary artery disease involving native coronary artery of native heart without angina pectoris   ~stable : denies angina  ~atypical chest discomfort presenting to ER 1/14/24  ~troponin neg ;

## 2024-02-07 NOTE — PATIENT INSTRUCTIONS
Let us know what the results of your monitor shows when it becomes available    Keep appt with Dr. Craven   DISPLAY PLAN FREE TEXT

## 2024-05-23 NOTE — PROGRESS NOTES
[facility]  [phone]      Patient: Angela Ahmadi  YOB: 1950         Chief Complaint   Patient presents with    6 Month Follow-Up               Referring provider: Pat Ayoub MD    History of Present Illness:   Angela Ahmadi is a 74 y.o. female presenting in follow up.     Today she is here alone. She states she has occasional chest pain episodes that she has a hard time describing. States it has been some time since she has had one. Denies dizziness/lightheadedness or SOB/BOOKER.     With regard to medication therapy he/she has been compliant with prescribed regimen and has tolerated therapy to date.    Past Medical History:   has a past medical history of Arthritis, Asthma, CAD (coronary artery disease), Cardiomyopathy (HCC), Congenital heart disease, GERD (gastroesophageal reflux disease), Hyperlipidemia, Hypertension, IBS (irritable bowel syndrome), Incontinence of urine, and MI (myocardial infarction) ([facility]).    Surgical History:   has a past surgical history that includes Hysterectomy; Bladder surgery (2010); Colonoscopy; other surgical history (2010); Coronary angioplasty with stent; bladder suspension; other surgical history; Colonoscopy (N/A, 11/17/2020); Colonoscopy (11/17/2020); Hemorrhoid surgery (N/A, 11/19/2020); and Colonoscopy (N/A, 10/17/2023).     Current Outpatient Medications   Medication Sig Dispense Refill    lisinopril (PRINIVIL;ZESTRIL) 40 MG tablet Take 0.5 tablets by mouth daily 30 tablet 5    clopidogrel (PLAVIX) 75 MG tablet Take 1 tablet by mouth every evening      pravastatin (PRAVACHOL) 80 MG tablet Take 1 tablet by mouth every evening      ranolazine (RANEXA) 500 MG extended release tablet Take 1 tablet by mouth 2 times daily 60 tablet 3    carvedilol (COREG) 25 MG tablet TAKE 1 TABLET BY MOUTH TWICE DAILY WITH MEALS 180 tablet 3    isosorbide mononitrate (IMDUR) 60 MG extended release tablet Take 1 tablet by mouth in the morning and at bedtime 180

## 2024-05-29 ENCOUNTER — OFFICE VISIT (OUTPATIENT)
Dept: CARDIOLOGY CLINIC | Age: 74
End: 2024-05-29
Payer: MEDICARE

## 2024-05-29 VITALS
SYSTOLIC BLOOD PRESSURE: 118 MMHG | WEIGHT: 142 LBS | OXYGEN SATURATION: 97 % | HEART RATE: 82 BPM | BODY MASS INDEX: 26.81 KG/M2 | HEIGHT: 61 IN | DIASTOLIC BLOOD PRESSURE: 64 MMHG

## 2024-05-29 DIAGNOSIS — I25.10 CORONARY ARTERY DISEASE INVOLVING NATIVE CORONARY ARTERY OF NATIVE HEART WITHOUT ANGINA PECTORIS: Primary | ICD-10-CM

## 2024-05-29 DIAGNOSIS — I10 PRIMARY HYPERTENSION: ICD-10-CM

## 2024-05-29 DIAGNOSIS — E78.2 MIXED HYPERLIPIDEMIA: ICD-10-CM

## 2024-05-29 DIAGNOSIS — Z72.0 TOBACCO ABUSE: ICD-10-CM

## 2024-05-29 DIAGNOSIS — I25.5 ISCHEMIC CARDIOMYOPATHY: ICD-10-CM

## 2024-05-29 PROCEDURE — 3017F COLORECTAL CA SCREEN DOC REV: CPT | Performed by: INTERNAL MEDICINE

## 2024-05-29 PROCEDURE — 1123F ACP DISCUSS/DSCN MKR DOCD: CPT | Performed by: INTERNAL MEDICINE

## 2024-05-29 PROCEDURE — 1090F PRES/ABSN URINE INCON ASSESS: CPT | Performed by: INTERNAL MEDICINE

## 2024-05-29 PROCEDURE — G8419 CALC BMI OUT NRM PARAM NOF/U: HCPCS | Performed by: INTERNAL MEDICINE

## 2024-05-29 PROCEDURE — 99214 OFFICE O/P EST MOD 30 MIN: CPT | Performed by: INTERNAL MEDICINE

## 2024-05-29 PROCEDURE — 3074F SYST BP LT 130 MM HG: CPT | Performed by: INTERNAL MEDICINE

## 2024-05-29 PROCEDURE — G8400 PT W/DXA NO RESULTS DOC: HCPCS | Performed by: INTERNAL MEDICINE

## 2024-05-29 PROCEDURE — G8427 DOCREV CUR MEDS BY ELIG CLIN: HCPCS | Performed by: INTERNAL MEDICINE

## 2024-05-29 PROCEDURE — 4004F PT TOBACCO SCREEN RCVD TLK: CPT | Performed by: INTERNAL MEDICINE

## 2024-05-29 PROCEDURE — 3078F DIAST BP <80 MM HG: CPT | Performed by: INTERNAL MEDICINE

## 2024-10-08 ENCOUNTER — OFFICE VISIT (OUTPATIENT)
Dept: CARDIOLOGY CLINIC | Age: 74
End: 2024-10-08
Payer: MEDICARE

## 2024-10-08 VITALS
HEIGHT: 61 IN | SYSTOLIC BLOOD PRESSURE: 106 MMHG | BODY MASS INDEX: 27.75 KG/M2 | HEART RATE: 67 BPM | DIASTOLIC BLOOD PRESSURE: 60 MMHG | OXYGEN SATURATION: 99 % | WEIGHT: 147 LBS

## 2024-10-08 DIAGNOSIS — R00.0 HEART RATE FAST: Primary | ICD-10-CM

## 2024-10-08 DIAGNOSIS — I25.5 ISCHEMIC CARDIOMYOPATHY: ICD-10-CM

## 2024-10-08 DIAGNOSIS — I25.10 CORONARY ARTERY DISEASE INVOLVING NATIVE CORONARY ARTERY OF NATIVE HEART WITHOUT ANGINA PECTORIS: ICD-10-CM

## 2024-10-08 DIAGNOSIS — I10 PRIMARY HYPERTENSION: ICD-10-CM

## 2024-10-08 PROCEDURE — G8400 PT W/DXA NO RESULTS DOC: HCPCS | Performed by: NURSE PRACTITIONER

## 2024-10-08 PROCEDURE — 3078F DIAST BP <80 MM HG: CPT | Performed by: NURSE PRACTITIONER

## 2024-10-08 PROCEDURE — 1123F ACP DISCUSS/DSCN MKR DOCD: CPT | Performed by: NURSE PRACTITIONER

## 2024-10-08 PROCEDURE — 3074F SYST BP LT 130 MM HG: CPT | Performed by: NURSE PRACTITIONER

## 2024-10-08 PROCEDURE — G8484 FLU IMMUNIZE NO ADMIN: HCPCS | Performed by: NURSE PRACTITIONER

## 2024-10-08 PROCEDURE — 99214 OFFICE O/P EST MOD 30 MIN: CPT | Performed by: NURSE PRACTITIONER

## 2024-10-08 PROCEDURE — 1090F PRES/ABSN URINE INCON ASSESS: CPT | Performed by: NURSE PRACTITIONER

## 2024-10-08 PROCEDURE — 3017F COLORECTAL CA SCREEN DOC REV: CPT | Performed by: NURSE PRACTITIONER

## 2024-10-08 PROCEDURE — G8427 DOCREV CUR MEDS BY ELIG CLIN: HCPCS | Performed by: NURSE PRACTITIONER

## 2024-10-08 PROCEDURE — 4004F PT TOBACCO SCREEN RCVD TLK: CPT | Performed by: NURSE PRACTITIONER

## 2024-10-08 PROCEDURE — G8419 CALC BMI OUT NRM PARAM NOF/U: HCPCS | Performed by: NURSE PRACTITIONER

## 2024-10-08 NOTE — PROGRESS NOTES
[facility]     Outpatient Follow Up Note    Angela Ahmadi is 74 y.o. female who presents today with a history of CAD s/p PTCA LAD '10;  RCA by cath '21, CM/EF 39% by MUGA '14; 50-55% on echo July '21, HTN and hyperlipidemia.       CHIEF COMPLAINT / HPI:  Follow Up secondary to calling with her heart beating too fast    Subjective:   She has episodes at rest ; none noticed when up moving around. Right now her heart's not doing it.   It started less than a week ago and happens off/on. She can't say how frequent nor the duration. However, it lasted awhile a couple of days ago because it kept her awake. She had SOB and pain in her back    She was started on prednisone a couple of weeks ago for arm / back pain. It would have already ended before she started having fast heart beats.  She has no identifiable triggers    She denies chest discomfort and not need to take NTG. She has pain in the top of her shoulders that goes down her back to her butt. Its been there for awhile.  There is SOB walking a lot .The patient denies orthopnea/PND. The patient does not have swelling. The patients weight is stable .     These symptoms are recurrent since the last OV.   With regard to medication therapy the patient has been compliant with prescribed regimen. They have tolerated therapy to date.     Past Medical History:   Diagnosis Date    Arthritis     Asthma     CAD (coronary artery disease)     Cardiomyopathy (HCC)     Congenital heart disease     GERD (gastroesophageal reflux disease)     Hyperlipidemia     Hypertension     IBS (irritable bowel syndrome)     Incontinence of urine     MI (myocardial infarction) ([facility]) 1995     Social History:    Social History     Tobacco Use   Smoking Status Every Day    Current packs/day: 0.50    Average packs/day: 0.5 packs/day for 50.0 years (25.0 ttl pk-yrs)    Types: Cigarettes, Cigars   Smokeless Tobacco Never     Current Medications:  Current Outpatient Medications   Medication

## 2025-02-18 ENCOUNTER — HOSPITAL ENCOUNTER (INPATIENT)
Age: 75
LOS: 1 days | Discharge: HOME | DRG: 552 | End: 2025-02-21
Attending: STUDENT IN AN ORGANIZED HEALTH CARE EDUCATION/TRAINING PROGRAM | Admitting: INTERNAL MEDICINE
Payer: MEDICARE

## 2025-02-18 ENCOUNTER — APPOINTMENT (OUTPATIENT)
Dept: CT IMAGING | Age: 75
DRG: 552 | End: 2025-02-18
Payer: MEDICARE

## 2025-02-18 ENCOUNTER — APPOINTMENT (OUTPATIENT)
Dept: GENERAL RADIOLOGY | Age: 75
DRG: 552 | End: 2025-02-18
Payer: MEDICARE

## 2025-02-18 ENCOUNTER — APPOINTMENT (OUTPATIENT)
Dept: VASCULAR LAB | Age: 75
DRG: 552 | End: 2025-02-18
Payer: MEDICARE

## 2025-02-18 DIAGNOSIS — M79.605 LEFT LEG PAIN: Primary | ICD-10-CM

## 2025-02-18 DIAGNOSIS — R26.2 UNABLE TO AMBULATE: ICD-10-CM

## 2025-02-18 LAB
ALBUMIN SERPL-MCNC: 4.5 G/DL (ref 3.4–5)
ALBUMIN/GLOB SERPL: 1.9 {RATIO} (ref 1.1–2.2)
ALP SERPL-CCNC: 61 U/L (ref 40–129)
ALT SERPL-CCNC: 7 U/L (ref 10–40)
ANION GAP SERPL CALCULATED.3IONS-SCNC: 10 MMOL/L (ref 3–16)
APTT BLD: 24.6 SEC (ref 22.1–36.4)
AST SERPL-CCNC: 18 U/L (ref 15–37)
BASOPHILS # BLD: 0 K/UL (ref 0–0.2)
BASOPHILS NFR BLD: 0.6 %
BILIRUB SERPL-MCNC: 0.4 MG/DL (ref 0–1)
BUN SERPL-MCNC: 10 MG/DL (ref 7–20)
CALCIUM SERPL-MCNC: 9.1 MG/DL (ref 8.3–10.6)
CHLORIDE SERPL-SCNC: 99 MMOL/L (ref 99–110)
CK SERPL-CCNC: 40 U/L (ref 26–192)
CO2 SERPL-SCNC: 25 MMOL/L (ref 21–32)
CREAT SERPL-MCNC: 0.8 MG/DL (ref 0.6–1.2)
DEPRECATED RDW RBC AUTO: 13.9 % (ref 12.4–15.4)
EOSINOPHIL # BLD: 0.3 K/UL (ref 0–0.6)
EOSINOPHIL NFR BLD: 4.5 %
GFR SERPLBLD CREATININE-BSD FMLA CKD-EPI: 76 ML/MIN/{1.73_M2}
GLUCOSE SERPL-MCNC: 129 MG/DL (ref 70–99)
HCT VFR BLD AUTO: 40.6 % (ref 36–48)
HGB BLD-MCNC: 13.6 G/DL (ref 12–16)
INR PPP: 0.93 (ref 0.85–1.15)
LYMPHOCYTES # BLD: 0.9 K/UL (ref 1–5.1)
LYMPHOCYTES NFR BLD: 12.1 %
MCH RBC QN AUTO: 30.1 PG (ref 26–34)
MCHC RBC AUTO-ENTMCNC: 33.5 G/DL (ref 31–36)
MCV RBC AUTO: 89.8 FL (ref 80–100)
MONOCYTES # BLD: 0.8 K/UL (ref 0–1.3)
MONOCYTES NFR BLD: 10.4 %
NEUTROPHILS # BLD: 5.7 K/UL (ref 1.7–7.7)
NEUTROPHILS NFR BLD: 72.4 %
PLATELET # BLD AUTO: 187 K/UL (ref 135–450)
PMV BLD AUTO: 7.4 FL (ref 5–10.5)
POTASSIUM SERPL-SCNC: 4.4 MMOL/L (ref 3.5–5.1)
PROT SERPL-MCNC: 6.9 G/DL (ref 6.4–8.2)
PROTHROMBIN TIME: 12.6 SEC (ref 11.9–14.9)
RBC # BLD AUTO: 4.52 M/UL (ref 4–5.2)
SODIUM SERPL-SCNC: 134 MMOL/L (ref 136–145)
WBC # BLD AUTO: 7.8 K/UL (ref 4–11)

## 2025-02-18 PROCEDURE — 93971 EXTREMITY STUDY: CPT | Performed by: INTERNAL MEDICINE

## 2025-02-18 PROCEDURE — 73502 X-RAY EXAM HIP UNI 2-3 VIEWS: CPT

## 2025-02-18 PROCEDURE — 85025 COMPLETE CBC W/AUTO DIFF WBC: CPT

## 2025-02-18 PROCEDURE — 6360000002 HC RX W HCPCS: Performed by: PHYSICIAN ASSISTANT

## 2025-02-18 PROCEDURE — 6370000000 HC RX 637 (ALT 250 FOR IP): Performed by: PHYSICIAN ASSISTANT

## 2025-02-18 PROCEDURE — 99285 EMERGENCY DEPT VISIT HI MDM: CPT

## 2025-02-18 PROCEDURE — 72131 CT LUMBAR SPINE W/O DYE: CPT

## 2025-02-18 PROCEDURE — 96374 THER/PROPH/DIAG INJ IV PUSH: CPT

## 2025-02-18 PROCEDURE — 80053 COMPREHEN METABOLIC PANEL: CPT

## 2025-02-18 PROCEDURE — 96375 TX/PRO/DX INJ NEW DRUG ADDON: CPT

## 2025-02-18 PROCEDURE — 73562 X-RAY EXAM OF KNEE 3: CPT

## 2025-02-18 PROCEDURE — 93971 EXTREMITY STUDY: CPT

## 2025-02-18 PROCEDURE — 85730 THROMBOPLASTIN TIME PARTIAL: CPT

## 2025-02-18 PROCEDURE — 85610 PROTHROMBIN TIME: CPT

## 2025-02-18 PROCEDURE — G0378 HOSPITAL OBSERVATION PER HR: HCPCS

## 2025-02-18 PROCEDURE — 82550 ASSAY OF CK (CPK): CPT

## 2025-02-18 RX ORDER — PRAVASTATIN SODIUM 80 MG/1
80 TABLET ORAL EVERY EVENING
Status: DISCONTINUED | OUTPATIENT
Start: 2025-02-18 | End: 2025-02-21 | Stop reason: HOSPADM

## 2025-02-18 RX ORDER — MAGNESIUM SULFATE IN WATER 40 MG/ML
2000 INJECTION, SOLUTION INTRAVENOUS PRN
Status: DISCONTINUED | OUTPATIENT
Start: 2025-02-18 | End: 2025-02-21 | Stop reason: HOSPADM

## 2025-02-18 RX ORDER — ACETAMINOPHEN 325 MG/1
650 TABLET ORAL EVERY 6 HOURS PRN
Status: DISCONTINUED | OUTPATIENT
Start: 2025-02-18 | End: 2025-02-21 | Stop reason: HOSPADM

## 2025-02-18 RX ORDER — ASPIRIN 81 MG/1
81 TABLET, CHEWABLE ORAL DAILY
Status: DISCONTINUED | OUTPATIENT
Start: 2025-02-19 | End: 2025-02-21 | Stop reason: HOSPADM

## 2025-02-18 RX ORDER — ONDANSETRON 2 MG/ML
4 INJECTION INTRAMUSCULAR; INTRAVENOUS EVERY 6 HOURS PRN
Status: DISCONTINUED | OUTPATIENT
Start: 2025-02-18 | End: 2025-02-21 | Stop reason: HOSPADM

## 2025-02-18 RX ORDER — CLOPIDOGREL BISULFATE 75 MG/1
75 TABLET ORAL EVERY EVENING
Status: DISCONTINUED | OUTPATIENT
Start: 2025-02-18 | End: 2025-02-21 | Stop reason: HOSPADM

## 2025-02-18 RX ORDER — ORPHENADRINE CITRATE 30 MG/ML
60 INJECTION INTRAMUSCULAR; INTRAVENOUS ONCE
Status: COMPLETED | OUTPATIENT
Start: 2025-02-18 | End: 2025-02-18

## 2025-02-18 RX ORDER — MORPHINE SULFATE 4 MG/ML
4 INJECTION, SOLUTION INTRAMUSCULAR; INTRAVENOUS ONCE
Status: COMPLETED | OUTPATIENT
Start: 2025-02-18 | End: 2025-02-18

## 2025-02-18 RX ORDER — SODIUM CHLORIDE 0.9 % (FLUSH) 0.9 %
5-40 SYRINGE (ML) INJECTION EVERY 12 HOURS SCHEDULED
Status: DISCONTINUED | OUTPATIENT
Start: 2025-02-18 | End: 2025-02-21 | Stop reason: HOSPADM

## 2025-02-18 RX ORDER — RANOLAZINE 500 MG/1
500 TABLET, EXTENDED RELEASE ORAL 2 TIMES DAILY
Status: DISCONTINUED | OUTPATIENT
Start: 2025-02-18 | End: 2025-02-21 | Stop reason: HOSPADM

## 2025-02-18 RX ORDER — AMLODIPINE BESYLATE 5 MG/1
5 TABLET ORAL DAILY
Status: DISCONTINUED | OUTPATIENT
Start: 2025-02-19 | End: 2025-02-21 | Stop reason: HOSPADM

## 2025-02-18 RX ORDER — POTASSIUM CHLORIDE 1500 MG/1
40 TABLET, EXTENDED RELEASE ORAL PRN
Status: DISCONTINUED | OUTPATIENT
Start: 2025-02-18 | End: 2025-02-21 | Stop reason: HOSPADM

## 2025-02-18 RX ORDER — ISOSORBIDE MONONITRATE 60 MG/1
60 TABLET, EXTENDED RELEASE ORAL 2 TIMES DAILY
Status: DISCONTINUED | OUTPATIENT
Start: 2025-02-18 | End: 2025-02-21 | Stop reason: HOSPADM

## 2025-02-18 RX ORDER — ONDANSETRON 2 MG/ML
4 INJECTION INTRAMUSCULAR; INTRAVENOUS ONCE
Status: COMPLETED | OUTPATIENT
Start: 2025-02-18 | End: 2025-02-18

## 2025-02-18 RX ORDER — POTASSIUM CHLORIDE 7.45 MG/ML
10 INJECTION INTRAVENOUS PRN
Status: DISCONTINUED | OUTPATIENT
Start: 2025-02-18 | End: 2025-02-21 | Stop reason: HOSPADM

## 2025-02-18 RX ORDER — LISINOPRIL 20 MG/1
20 TABLET ORAL DAILY
Status: DISCONTINUED | OUTPATIENT
Start: 2025-02-19 | End: 2025-02-21 | Stop reason: HOSPADM

## 2025-02-18 RX ORDER — SODIUM CHLORIDE 9 MG/ML
INJECTION, SOLUTION INTRAVENOUS PRN
Status: DISCONTINUED | OUTPATIENT
Start: 2025-02-18 | End: 2025-02-21 | Stop reason: HOSPADM

## 2025-02-18 RX ORDER — SENNOSIDES A AND B 8.6 MG/1
1 TABLET, FILM COATED ORAL DAILY PRN
Status: DISCONTINUED | OUTPATIENT
Start: 2025-02-18 | End: 2025-02-21 | Stop reason: HOSPADM

## 2025-02-18 RX ORDER — ACETAMINOPHEN 650 MG/1
650 SUPPOSITORY RECTAL EVERY 6 HOURS PRN
Status: DISCONTINUED | OUTPATIENT
Start: 2025-02-18 | End: 2025-02-21 | Stop reason: HOSPADM

## 2025-02-18 RX ORDER — PANTOPRAZOLE SODIUM 40 MG/1
40 TABLET, DELAYED RELEASE ORAL
Status: DISCONTINUED | OUTPATIENT
Start: 2025-02-19 | End: 2025-02-21 | Stop reason: HOSPADM

## 2025-02-18 RX ORDER — CARVEDILOL 25 MG/1
25 TABLET ORAL 2 TIMES DAILY WITH MEALS
Status: DISCONTINUED | OUTPATIENT
Start: 2025-02-19 | End: 2025-02-21 | Stop reason: HOSPADM

## 2025-02-18 RX ORDER — HYDROCODONE BITARTRATE AND ACETAMINOPHEN 5; 325 MG/1; MG/1
1 TABLET ORAL ONCE
Status: COMPLETED | OUTPATIENT
Start: 2025-02-18 | End: 2025-02-18

## 2025-02-18 RX ORDER — SODIUM CHLORIDE 0.9 % (FLUSH) 0.9 %
5-40 SYRINGE (ML) INJECTION PRN
Status: DISCONTINUED | OUTPATIENT
Start: 2025-02-18 | End: 2025-02-21 | Stop reason: HOSPADM

## 2025-02-18 RX ORDER — ENOXAPARIN SODIUM 100 MG/ML
40 INJECTION SUBCUTANEOUS DAILY
Status: DISCONTINUED | OUTPATIENT
Start: 2025-02-19 | End: 2025-02-21 | Stop reason: HOSPADM

## 2025-02-18 RX ORDER — ONDANSETRON 4 MG/1
4 TABLET, ORALLY DISINTEGRATING ORAL ONCE
Status: COMPLETED | OUTPATIENT
Start: 2025-02-18 | End: 2025-02-18

## 2025-02-18 RX ADMIN — ONDANSETRON 4 MG: 2 INJECTION, SOLUTION INTRAMUSCULAR; INTRAVENOUS at 19:26

## 2025-02-18 RX ADMIN — ORPHENADRINE CITRATE 60 MG: 60 INJECTION INTRAMUSCULAR; INTRAVENOUS at 21:11

## 2025-02-18 RX ADMIN — MORPHINE SULFATE 4 MG: 4 INJECTION, SOLUTION INTRAMUSCULAR; INTRAVENOUS at 19:26

## 2025-02-18 RX ADMIN — HYDROCODONE BITARTRATE AND ACETAMINOPHEN 1 TABLET: 5; 325 TABLET ORAL at 15:33

## 2025-02-18 RX ADMIN — ONDANSETRON 4 MG: 4 TABLET, ORALLY DISINTEGRATING ORAL at 15:33

## 2025-02-18 ASSESSMENT — PAIN SCALES - GENERAL
PAINLEVEL_OUTOF10: 5
PAINLEVEL_OUTOF10: 4

## 2025-02-18 ASSESSMENT — ENCOUNTER SYMPTOMS
ABDOMINAL PAIN: 0
DIARRHEA: 0
RESPIRATORY NEGATIVE: 1
NAUSEA: 0
SHORTNESS OF BREATH: 0
COLOR CHANGE: 0
CONSTIPATION: 0
ABDOMINAL DISTENTION: 0
CHEST TIGHTNESS: 0
PHOTOPHOBIA: 0
VOMITING: 0
COUGH: 0
BACK PAIN: 0

## 2025-02-18 ASSESSMENT — PAIN DESCRIPTION - ORIENTATION: ORIENTATION: LEFT

## 2025-02-18 ASSESSMENT — PAIN DESCRIPTION - LOCATION: LOCATION: LEG

## 2025-02-18 ASSESSMENT — PAIN DESCRIPTION - DESCRIPTORS: DESCRIPTORS: ACHING

## 2025-02-18 ASSESSMENT — PAIN - FUNCTIONAL ASSESSMENT: PAIN_FUNCTIONAL_ASSESSMENT: 0-10

## 2025-02-18 NOTE — ED PROVIDER NOTES
[facility] EMERGENCY DEPARTMENT  EMERGENCY DEPARTMENT ENCOUNTER        Pt Name: Angela Ahmadi  MRN: [id number]  Birthdate 1950  Date of evaluation: 2/18/2025  Provider: BROOKE Poon  PCP: Pat Ayoub MD  Note Started: 3:32 PM EST 2/18/25      SHABANA. I have evaluated this patient.        CHIEF COMPLAINT       Chief Complaint   Patient presents with    Leg Pain     WC Ems from home due to left leg pain that started yesterday, denies injury, trauma.        HISTORY OF PRESENT ILLNESS: 1 or more Elements     History From: Patient  Limitations to history : None    Angela Ahmadi is a 74 y.o. female with past medical history of CAD, hypertension, hyperlipidemia and cardiomyopathy who presents ED with complaint of left leg pain.  Patient reports pain to her left leg that began yesterday.  She denies any injury or trauma.  Brought by EMS from home for further evaluation treatment.  Pain to the posterior aspect of her left calf and radiates up her posterior leg to her hip.  No specific back pain.  No reported history of back problems in the past.  She states pain is worsened when she ambulates.  Denies any specific pain at rest.  Reports decreased range of motion strength secondary to pain.  Denies fever or chills.  Denies leg swelling.  Denies rashes or lesions.  Denies any numbness or tingling.  Denies ecchymosis, erythema or warmth.  Denies bowel/bladder incontinence, urinary tension or saddle anesthesia denies abdominal pain, nausea or vomiting.  Denies urinary symptoms or changes in bowel movements.  No fever or chills.  No rashes or lesions.  No history of blood clots.  No blood thinners.  No chest pain or shortness of breath    Nursing Notes were all reviewed and agreed with or any disagreements were addressed in the HPI.    REVIEW OF SYSTEMS :      Review of Systems   Constitutional:  Negative for activity change, appetite change, chills, diaphoresis, fatigue and fever.   Eyes:  Negative

## 2025-02-19 LAB
ANION GAP SERPL CALCULATED.3IONS-SCNC: 8 MMOL/L (ref 3–16)
BASOPHILS # BLD: 0.1 K/UL (ref 0–0.2)
BASOPHILS NFR BLD: 0.8 %
BUN SERPL-MCNC: 11 MG/DL (ref 7–20)
CALCIUM SERPL-MCNC: 8.9 MG/DL (ref 8.3–10.6)
CHLORIDE SERPL-SCNC: 100 MMOL/L (ref 99–110)
CO2 SERPL-SCNC: 25 MMOL/L (ref 21–32)
CREAT SERPL-MCNC: 0.9 MG/DL (ref 0.6–1.2)
CRP SERPL-MCNC: 3.1 MG/L (ref 0–5.1)
DEPRECATED RDW RBC AUTO: 14.1 % (ref 12.4–15.4)
EOSINOPHIL # BLD: 0.3 K/UL (ref 0–0.6)
EOSINOPHIL NFR BLD: 4.2 %
ERYTHROCYTE [SEDIMENTATION RATE] IN BLOOD BY WESTERGREN METHOD: 10 MM/HR (ref 0–30)
GFR SERPLBLD CREATININE-BSD FMLA CKD-EPI: 66 ML/MIN/{1.73_M2}
GLUCOSE SERPL-MCNC: 108 MG/DL (ref 70–99)
HCT VFR BLD AUTO: 38.1 % (ref 36–48)
HGB BLD-MCNC: 12.7 G/DL (ref 12–16)
LYMPHOCYTES # BLD: 1 K/UL (ref 1–5.1)
LYMPHOCYTES NFR BLD: 15.2 %
MCH RBC QN AUTO: 30.1 PG (ref 26–34)
MCHC RBC AUTO-ENTMCNC: 33.2 G/DL (ref 31–36)
MCV RBC AUTO: 90.5 FL (ref 80–100)
MONOCYTES # BLD: 0.8 K/UL (ref 0–1.3)
MONOCYTES NFR BLD: 12.7 %
NEUTROPHILS # BLD: 4.5 K/UL (ref 1.7–7.7)
NEUTROPHILS NFR BLD: 67.1 %
PLATELET # BLD AUTO: 175 K/UL (ref 135–450)
PMV BLD AUTO: 7.4 FL (ref 5–10.5)
POTASSIUM SERPL-SCNC: 4.4 MMOL/L (ref 3.5–5.1)
RBC # BLD AUTO: 4.21 M/UL (ref 4–5.2)
SODIUM SERPL-SCNC: 133 MMOL/L (ref 136–145)
URATE SERPL-MCNC: 5.4 MG/DL (ref 2.6–6)
WBC # BLD AUTO: 6.7 K/UL (ref 4–11)

## 2025-02-19 PROCEDURE — 85025 COMPLETE CBC W/AUTO DIFF WBC: CPT

## 2025-02-19 PROCEDURE — 6370000000 HC RX 637 (ALT 250 FOR IP): Performed by: NURSE PRACTITIONER

## 2025-02-19 PROCEDURE — G0378 HOSPITAL OBSERVATION PER HR: HCPCS

## 2025-02-19 PROCEDURE — 2500000003 HC RX 250 WO HCPCS: Performed by: PHYSICIAN ASSISTANT

## 2025-02-19 PROCEDURE — 6370000000 HC RX 637 (ALT 250 FOR IP): Performed by: PHYSICIAN ASSISTANT

## 2025-02-19 PROCEDURE — 80048 BASIC METABOLIC PNL TOTAL CA: CPT

## 2025-02-19 PROCEDURE — 86140 C-REACTIVE PROTEIN: CPT

## 2025-02-19 PROCEDURE — 97530 THERAPEUTIC ACTIVITIES: CPT

## 2025-02-19 PROCEDURE — 6360000002 HC RX W HCPCS: Performed by: NURSE PRACTITIONER

## 2025-02-19 PROCEDURE — 85652 RBC SED RATE AUTOMATED: CPT

## 2025-02-19 PROCEDURE — 97162 PT EVAL MOD COMPLEX 30 MIN: CPT

## 2025-02-19 PROCEDURE — 36415 COLL VENOUS BLD VENIPUNCTURE: CPT

## 2025-02-19 PROCEDURE — 6360000002 HC RX W HCPCS: Performed by: PHYSICIAN ASSISTANT

## 2025-02-19 PROCEDURE — 2500000003 HC RX 250 WO HCPCS: Performed by: NURSE PRACTITIONER

## 2025-02-19 PROCEDURE — 84550 ASSAY OF BLOOD/URIC ACID: CPT

## 2025-02-19 PROCEDURE — 97116 GAIT TRAINING THERAPY: CPT

## 2025-02-19 RX ORDER — METHOCARBAMOL 750 MG/1
750 TABLET, FILM COATED ORAL 3 TIMES DAILY PRN
Status: DISCONTINUED | OUTPATIENT
Start: 2025-02-19 | End: 2025-02-21 | Stop reason: HOSPADM

## 2025-02-19 RX ORDER — ACETAMINOPHEN 325 MG/1
650 TABLET ORAL 3 TIMES DAILY
Status: DISCONTINUED | OUTPATIENT
Start: 2025-02-19 | End: 2025-02-21 | Stop reason: HOSPADM

## 2025-02-19 RX ADMIN — LISINOPRIL 20 MG: 20 TABLET ORAL at 08:55

## 2025-02-19 RX ADMIN — PANTOPRAZOLE SODIUM 40 MG: 40 TABLET, DELAYED RELEASE ORAL at 05:49

## 2025-02-19 RX ADMIN — RANOLAZINE 500 MG: 500 TABLET, EXTENDED RELEASE ORAL at 20:55

## 2025-02-19 RX ADMIN — PRAVASTATIN SODIUM 80 MG: 80 TABLET ORAL at 01:35

## 2025-02-19 RX ADMIN — AMLODIPINE BESYLATE 5 MG: 5 TABLET ORAL at 08:55

## 2025-02-19 RX ADMIN — METHOCARBAMOL TABLETS 750 MG: 750 TABLET, COATED ORAL at 05:49

## 2025-02-19 RX ADMIN — PRAVASTATIN SODIUM 80 MG: 80 TABLET ORAL at 17:33

## 2025-02-19 RX ADMIN — ACETAMINOPHEN 650 MG: 325 TABLET ORAL at 08:58

## 2025-02-19 RX ADMIN — WATER 40 MG: 1 INJECTION INTRAMUSCULAR; INTRAVENOUS; SUBCUTANEOUS at 12:52

## 2025-02-19 RX ADMIN — SODIUM CHLORIDE, PRESERVATIVE FREE 10 ML: 5 INJECTION INTRAVENOUS at 20:56

## 2025-02-19 RX ADMIN — MELATONIN TAB 3 MG 3 MG: 3 TAB at 23:43

## 2025-02-19 RX ADMIN — CLOPIDOGREL BISULFATE 75 MG: 75 TABLET ORAL at 17:33

## 2025-02-19 RX ADMIN — ENOXAPARIN SODIUM 40 MG: 100 INJECTION SUBCUTANEOUS at 08:55

## 2025-02-19 RX ADMIN — METHOCARBAMOL TABLETS 750 MG: 750 TABLET, COATED ORAL at 17:33

## 2025-02-19 RX ADMIN — CARVEDILOL 25 MG: 25 TABLET, FILM COATED ORAL at 17:33

## 2025-02-19 RX ADMIN — ACETAMINOPHEN 650 MG: 325 TABLET ORAL at 12:52

## 2025-02-19 RX ADMIN — CLOPIDOGREL BISULFATE 75 MG: 75 TABLET ORAL at 01:34

## 2025-02-19 RX ADMIN — CARVEDILOL 25 MG: 25 TABLET, FILM COATED ORAL at 08:55

## 2025-02-19 RX ADMIN — ISOSORBIDE MONONITRATE 60 MG: 60 TABLET, EXTENDED RELEASE ORAL at 20:54

## 2025-02-19 RX ADMIN — RANOLAZINE 500 MG: 500 TABLET, EXTENDED RELEASE ORAL at 01:35

## 2025-02-19 RX ADMIN — ISOSORBIDE MONONITRATE 60 MG: 60 TABLET, EXTENDED RELEASE ORAL at 08:55

## 2025-02-19 RX ADMIN — ISOSORBIDE MONONITRATE 60 MG: 60 TABLET, EXTENDED RELEASE ORAL at 01:34

## 2025-02-19 RX ADMIN — ASPIRIN 81 MG: 81 TABLET, CHEWABLE ORAL at 08:55

## 2025-02-19 RX ADMIN — SODIUM CHLORIDE, PRESERVATIVE FREE 10 ML: 5 INJECTION INTRAVENOUS at 08:55

## 2025-02-19 RX ADMIN — ACETAMINOPHEN 650 MG: 325 TABLET ORAL at 20:55

## 2025-02-19 RX ADMIN — RANOLAZINE 500 MG: 500 TABLET, EXTENDED RELEASE ORAL at 08:55

## 2025-02-19 ASSESSMENT — PAIN DESCRIPTION - LOCATION
LOCATION: LEG

## 2025-02-19 ASSESSMENT — PAIN DESCRIPTION - DESCRIPTORS
DESCRIPTORS: ACHING
DESCRIPTORS: ACHING

## 2025-02-19 ASSESSMENT — PAIN SCALES - GENERAL
PAINLEVEL_OUTOF10: 5
PAINLEVEL_OUTOF10: 9
PAINLEVEL_OUTOF10: 5
PAINLEVEL_OUTOF10: 8

## 2025-02-19 ASSESSMENT — PAIN DESCRIPTION - ORIENTATION
ORIENTATION: LEFT
ORIENTATION: LEFT
ORIENTATION: RIGHT;LOWER

## 2025-02-19 ASSESSMENT — LIFESTYLE VARIABLES
HOW OFTEN DO YOU HAVE A DRINK CONTAINING ALCOHOL: NEVER
HOW MANY STANDARD DRINKS CONTAINING ALCOHOL DO YOU HAVE ON A TYPICAL DAY: PATIENT DOES NOT DRINK

## 2025-02-19 ASSESSMENT — PAIN DESCRIPTION - PAIN TYPE: TYPE: ACUTE PAIN

## 2025-02-19 NOTE — CONSULTS
[facility] Orthopedic Surgery  Consult Note    Patient: Angela Ahmadi  Admit Date: 2/18/2025  Requesting Physician: Alexandria Sands MD  Room: ED-0030/30    Chief complaint: LEFT leg pain    HPI: Angela Ahmadi is a 74 y.o. female who presented to [facility] ER with complaints of 2-3 days of left leg symptoms.   Started Sunday and she felt she was unable to walk dur to pain yesterday.  Describes a pins and needles sensation at the base of her foot extending up her leg.  She also has posterior leg pain involving nearly the entire leg when standing or walking or getting out of bed.   She was given muscle relaxors for some perceived radicular symptoms by PCP back in Dec.     Independent imaging review of the left knee and hip via plain films demonstrated: mild degenerative changes without fracture.  Lumbar CT shows pronounced degenerative changes L4-S1    Patient uses no assistive devices to ambulate.      Relevant notes, labs and other tests reviewed.  Medical History:  Past Medical History:   Diagnosis Date    Arthritis     Asthma     CAD (coronary artery disease)     Cardiomyopathy (HCC)     Congenital heart disease     GERD (gastroesophageal reflux disease)     Hyperlipidemia     Hypertension     IBS (irritable bowel syndrome)     Incontinence of urine     MI (myocardial infarction) ([facility]) 1995     Past Surgical History:   Procedure Laterality Date    BLADDER SURGERY  2010    sling    BLADDER SUSPENSION      COLONOSCOPY      COLONOSCOPY N/A 11/17/2020    COLONOSCOPY POLYPECTOMY SNARE/COLD BIOPSY performed by Orlando Ureña MD at [facility] ENDOSCOPY    COLONOSCOPY  11/17/2020    COLONOSCOPY WITH BIOPSY performed by Orlando Ureña MD at [facility] ENDOSCOPY    COLONOSCOPY N/A 10/17/2023    COLONOSCOPY POLYPECTOMY SNARE/COLD BIOPSY performed by Orlando Ureña MD at [facility] ENDOSCOPY    CORONARY ANGIOPLASTY WITH STENT PLACEMENT      2 heart stents    HEMORRHOID SURGERY N/A 11/19/2020    HEMORRHOIDECTOMY performed

## 2025-02-19 NOTE — H&P
Hospital Medicine History & Physical        Name:  Angela Ahmadi /Age/Sex: 1950  (74 y.o. female)   MRN & CSN:  [id number] & [id number] Encounter Date/Time: 2025 8:32 PM EST   Location:  ED-001 PCP: Pat Ayoub MD         CHIEF COMPLAINT:   Chief Complaint   Patient presents with    Leg Pain     WC Ems from home due to left leg pain that started yesterday, denies injury, trauma.          HISTORY OF PRESENT ILLNESS:      History from: patient  Limitations to history : None     Angela Ahmadi is a 74 y.o. female who presented to ED for evaluation of left leg pain that began yesterday.  Patient denies any injury or trauma.  She reports pain is located to her left calf and radiates up her posterior leg to her hip.  She denies any back pain.  She denies any history of back problems in the past.  She reports pain is worse when she ambulates.  She reports decreased range of motion and strength due to pain.  She denies any leg swelling, rashes, numbness or tingling, warmth.  She denies fever, chest pain, shortness of breath, GI symptoms, urinary symptoms.  She denies any other complaints or concerns at this time.    Workup initiated in ED.  CT lumbar spine negative for acute process.  Doppler LLE negative for DVT.  X-ray left hip and knee negative for acute process.  Patient was given Norco in ED with improvement in symptoms.  However she still had significant pain when attempting to ambulate.      REVIEW OF SYSTEMS:   Pertinent positives as noted in the HPI. All other systems reviewed and negative.      PHYSICAL EXAM PERFORMED:  BP (!) 145/76   Pulse 78   Temp 98 °F (36.7 °C) (Oral)   Resp 18   Ht 1.549 m (5' 1\")   Wt 68 kg (150 lb)   SpO2 98%   BMI 28.34 kg/m²     General appearance:  Awake, alert, no apparent distress  HEENT:  Normocephalic, atraumatic without obvious deformity. PERRL. EOM intact. Conjunctivae/corneas clear.  Neck:  Supple, with full range of motion. No JVD.

## 2025-02-19 NOTE — ACP (ADVANCE CARE PLANNING)
Advance Care Planning Note       Purpose of Encounter: Advanced care planning in light of hospitalization left leg pain  Parties in attendance: :Angela Ahmadi, JEN HERRON MD  Decisional Capacity:Yes  Objective: This 74 y.o. female who presented with intractable left leg pain   Goals of Care Determinations: Pt wants full support measures including CPR, intubation, trach, PEG tube, HD d  Code Status: Full  Time Spent on Advanced Planning Documents: 16 mins.  Advanced Care Planning Documents:  Completed advances directives, advanced directives in chart.      Electronically signed by JEN HERRON MD on 2/19/2025 at 5:07 PM

## 2025-02-20 PROBLEM — M54.10 RADICULAR PAIN: Status: ACTIVE | Noted: 2025-02-20

## 2025-02-20 LAB
ANION GAP SERPL CALCULATED.3IONS-SCNC: 9 MMOL/L (ref 3–16)
BASOPHILS # BLD: 0 K/UL (ref 0–0.2)
BASOPHILS NFR BLD: 0.3 %
BUN SERPL-MCNC: 18 MG/DL (ref 7–20)
CALCIUM SERPL-MCNC: 9.3 MG/DL (ref 8.3–10.6)
CHLORIDE SERPL-SCNC: 100 MMOL/L (ref 99–110)
CO2 SERPL-SCNC: 25 MMOL/L (ref 21–32)
CREAT SERPL-MCNC: 0.9 MG/DL (ref 0.6–1.2)
DEPRECATED RDW RBC AUTO: 13.7 % (ref 12.4–15.4)
EOSINOPHIL # BLD: 0 K/UL (ref 0–0.6)
EOSINOPHIL NFR BLD: 0.1 %
GFR SERPLBLD CREATININE-BSD FMLA CKD-EPI: 66 ML/MIN/{1.73_M2}
GLUCOSE SERPL-MCNC: 130 MG/DL (ref 70–99)
HCT VFR BLD AUTO: 38.3 % (ref 36–48)
HGB BLD-MCNC: 12.8 G/DL (ref 12–16)
LYMPHOCYTES # BLD: 0.6 K/UL (ref 1–5.1)
LYMPHOCYTES NFR BLD: 7.1 %
MCH RBC QN AUTO: 30.2 PG (ref 26–34)
MCHC RBC AUTO-ENTMCNC: 33.4 G/DL (ref 31–36)
MCV RBC AUTO: 90.6 FL (ref 80–100)
MONOCYTES # BLD: 0.5 K/UL (ref 0–1.3)
MONOCYTES NFR BLD: 6 %
NEUTROPHILS # BLD: 7.4 K/UL (ref 1.7–7.7)
NEUTROPHILS NFR BLD: 86.5 %
PLATELET # BLD AUTO: 178 K/UL (ref 135–450)
PMV BLD AUTO: 7.4 FL (ref 5–10.5)
POTASSIUM SERPL-SCNC: 4.9 MMOL/L (ref 3.5–5.1)
RBC # BLD AUTO: 4.22 M/UL (ref 4–5.2)
SODIUM SERPL-SCNC: 134 MMOL/L (ref 136–145)
WBC # BLD AUTO: 8.6 K/UL (ref 4–11)

## 2025-02-20 PROCEDURE — 2500000003 HC RX 250 WO HCPCS: Performed by: PHYSICIAN ASSISTANT

## 2025-02-20 PROCEDURE — 1200000000 HC SEMI PRIVATE

## 2025-02-20 PROCEDURE — 97165 OT EVAL LOW COMPLEX 30 MIN: CPT

## 2025-02-20 PROCEDURE — 80048 BASIC METABOLIC PNL TOTAL CA: CPT

## 2025-02-20 PROCEDURE — 85025 COMPLETE CBC W/AUTO DIFF WBC: CPT

## 2025-02-20 PROCEDURE — 6360000002 HC RX W HCPCS: Performed by: NURSE PRACTITIONER

## 2025-02-20 PROCEDURE — 36415 COLL VENOUS BLD VENIPUNCTURE: CPT

## 2025-02-20 PROCEDURE — 97530 THERAPEUTIC ACTIVITIES: CPT

## 2025-02-20 PROCEDURE — 6370000000 HC RX 637 (ALT 250 FOR IP): Performed by: NURSE PRACTITIONER

## 2025-02-20 PROCEDURE — 2500000003 HC RX 250 WO HCPCS: Performed by: NURSE PRACTITIONER

## 2025-02-20 PROCEDURE — 6370000000 HC RX 637 (ALT 250 FOR IP): Performed by: PHYSICIAN ASSISTANT

## 2025-02-20 PROCEDURE — 6370000000 HC RX 637 (ALT 250 FOR IP): Performed by: INTERNAL MEDICINE

## 2025-02-20 PROCEDURE — 6360000002 HC RX W HCPCS: Performed by: PHYSICIAN ASSISTANT

## 2025-02-20 PROCEDURE — 97116 GAIT TRAINING THERAPY: CPT

## 2025-02-20 RX ORDER — PREDNISONE 20 MG/1
20 TABLET ORAL 2 TIMES DAILY
Qty: 10 TABLET | Refills: 0 | Status: SHIPPED | OUTPATIENT
Start: 2025-02-20 | End: 2025-02-21 | Stop reason: HOSPADM

## 2025-02-20 RX ORDER — PREDNISONE 20 MG/1
20 TABLET ORAL DAILY
Status: DISCONTINUED | OUTPATIENT
Start: 2025-02-21 | End: 2025-02-21

## 2025-02-20 RX ORDER — GUAIFENESIN/DEXTROMETHORPHAN 100-10MG/5
5 SYRUP ORAL EVERY 4 HOURS PRN
Status: DISCONTINUED | OUTPATIENT
Start: 2025-02-20 | End: 2025-02-21 | Stop reason: HOSPADM

## 2025-02-20 RX ADMIN — GUAIFENESIN AND DEXTROMETHORPHAN 5 ML: 100; 10 SYRUP ORAL at 10:18

## 2025-02-20 RX ADMIN — CLOPIDOGREL BISULFATE 75 MG: 75 TABLET ORAL at 16:50

## 2025-02-20 RX ADMIN — RANOLAZINE 500 MG: 500 TABLET, EXTENDED RELEASE ORAL at 09:06

## 2025-02-20 RX ADMIN — ACETAMINOPHEN 650 MG: 325 TABLET ORAL at 21:14

## 2025-02-20 RX ADMIN — ISOSORBIDE MONONITRATE 60 MG: 60 TABLET, EXTENDED RELEASE ORAL at 21:14

## 2025-02-20 RX ADMIN — ISOSORBIDE MONONITRATE 60 MG: 60 TABLET, EXTENDED RELEASE ORAL at 09:06

## 2025-02-20 RX ADMIN — RANOLAZINE 500 MG: 500 TABLET, EXTENDED RELEASE ORAL at 21:14

## 2025-02-20 RX ADMIN — LISINOPRIL 20 MG: 20 TABLET ORAL at 09:06

## 2025-02-20 RX ADMIN — PANTOPRAZOLE SODIUM 40 MG: 40 TABLET, DELAYED RELEASE ORAL at 04:52

## 2025-02-20 RX ADMIN — CARVEDILOL 25 MG: 25 TABLET, FILM COATED ORAL at 09:06

## 2025-02-20 RX ADMIN — MELATONIN TAB 3 MG 3 MG: 3 TAB at 23:20

## 2025-02-20 RX ADMIN — PRAVASTATIN SODIUM 80 MG: 80 TABLET ORAL at 16:50

## 2025-02-20 RX ADMIN — ACETAMINOPHEN 650 MG: 325 TABLET ORAL at 09:06

## 2025-02-20 RX ADMIN — GUAIFENESIN AND DEXTROMETHORPHAN 5 ML: 100; 10 SYRUP ORAL at 16:49

## 2025-02-20 RX ADMIN — SODIUM CHLORIDE, PRESERVATIVE FREE 10 ML: 5 INJECTION INTRAVENOUS at 09:07

## 2025-02-20 RX ADMIN — CARVEDILOL 25 MG: 25 TABLET, FILM COATED ORAL at 15:31

## 2025-02-20 RX ADMIN — ACETAMINOPHEN 650 MG: 325 TABLET ORAL at 15:31

## 2025-02-20 RX ADMIN — WATER 40 MG: 1 INJECTION INTRAMUSCULAR; INTRAVENOUS; SUBCUTANEOUS at 09:05

## 2025-02-20 RX ADMIN — ASPIRIN 81 MG: 81 TABLET, CHEWABLE ORAL at 09:06

## 2025-02-20 RX ADMIN — GUAIFENESIN AND DEXTROMETHORPHAN 5 ML: 100; 10 SYRUP ORAL at 21:14

## 2025-02-20 RX ADMIN — ENOXAPARIN SODIUM 40 MG: 100 INJECTION SUBCUTANEOUS at 09:06

## 2025-02-20 RX ADMIN — METHOCARBAMOL TABLETS 750 MG: 750 TABLET, COATED ORAL at 04:57

## 2025-02-20 RX ADMIN — SODIUM CHLORIDE, PRESERVATIVE FREE 10 ML: 5 INJECTION INTRAVENOUS at 21:18

## 2025-02-20 ASSESSMENT — PAIN SCALES - GENERAL
PAINLEVEL_OUTOF10: 6
PAINLEVEL_OUTOF10: 2
PAINLEVEL_OUTOF10: 0

## 2025-02-20 ASSESSMENT — PAIN DESCRIPTION - DESCRIPTORS: DESCRIPTORS: ACHING

## 2025-02-20 ASSESSMENT — PAIN DESCRIPTION - ORIENTATION: ORIENTATION: LEFT;LOWER

## 2025-02-20 ASSESSMENT — PAIN - FUNCTIONAL ASSESSMENT: PAIN_FUNCTIONAL_ASSESSMENT: ACTIVITIES ARE NOT PREVENTED

## 2025-02-20 ASSESSMENT — PAIN DESCRIPTION - LOCATION: LOCATION: LEG

## 2025-02-20 ASSESSMENT — PAIN SCALES - WONG BAKER: WONGBAKER_NUMERICALRESPONSE: NO HURT

## 2025-02-20 NOTE — PLAN OF CARE
Problem: Safety - Adult  Goal: Free from fall injury  2/20/2025 1115 by Kash Waters, RN  Outcome: Progressing  2/20/2025 0036 by Alina Muhammad, RN  Outcome: Progressing     Problem: Discharge Planning  Goal: Discharge to home or other facility with appropriate resources  Outcome: Progressing     Problem: Pain  Goal: Verbalizes/displays adequate comfort level or baseline comfort level  Outcome: Progressing

## 2025-02-20 NOTE — CARE COORDINATION
02/20/25 1127   IMM Letter   IMM Letter given to Patient/Family/Significant other/Guardian/POA/by: Upgrade IMM Given   IMM Letter date given: 02/20/25   IMM Letter time given: 1127

## 2025-02-20 NOTE — PROGRESS NOTES
Pharmacy Home Medication Reconciliation Note    A medication reconciliation has been completed for Jose Frias 1950    Pharmacy: [address] provided by: Patient     The patient's home medication list is as follows: No current facility-administered medications on file prior to encounter. Current Outpatient Medications on File Prior to Encounter   Medication Sig Dispense Refill    clopidogrel (PLAVIX) 75 MG tablet TAKE 1 TABLET BY MOUTH DAILY (Patient taking differently: Take 1 tablet by mouth every evening TAKE 1 TABLET BY MOUTH DAILY) 90 tablet 3    carvedilol (COREG) 25 MG tablet TAKE 1 TABLET BY MOUTH TWICE DAILY WITH MEALS 180 tablet 3    lisinopril (PRINIVIL;ZESTRIL) 40 MG tablet TAKE 1 TABLET BY MOUTH EVERY DAY (Patient taking differently: Take 1 tablet by mouth nightly TAKE 1 TABLET BY MOUTH EVERY DAY) 90 tablet 3    isosorbide mononitrate (IMDUR) 60 MG extended release tablet Take 1 tablet by mouth in the morning and at bedtime 180 tablet 3    pantoprazole (PROTONIX) 40 MG tablet Take 1 tablet by mouth every morning (before breakfast) 30 tablet 3    [DISCONTINUED] hydrOXYzine (VISTARIL) 25 MG capsule Take 25 mg by mouth 3 times daily as needed for Anxiety  (Patient not taking: Reported on 7/8/2022)      nitroGLYCERIN (NITROSTAT) 0.4 MG SL tablet Place 1 tablet under the tongue every 5 minutes as needed for Chest pain 25 tablet 1    aspirin 81 MG chewable tablet Take 1 tablet by mouth daily      pravastatin (PRAVACHOL) 80 MG tablet Take 1 tablet by mouth daily (Patient taking differently: Take 1 tablet by mouth at bedtime) 90 tablet 1    amLODIPine (NORVASC) 5 MG tablet Take 1 tablet by mouth daily         Patient is no longer taking Hydroxyzine 25 mg capsule     Timing of last doses updated.     Thank you,  Darlene Williamson [facility]

## 2025-02-20 NOTE — CARE COORDINATION
Case Management Assessment  Initial Evaluation    Date/Time of Evaluation: 2/20/2025 3:46 PM  Assessment Completed by: GA Tyler    If patient is discharged prior to next notation, then this note serves as note for discharge by case management.    Patient Name: Angela Ahmadi                   YOB: 1950  Diagnosis: Left leg pain [M79.605]  Unable to ambulate [R26.2]  Radicular pain [M54.10]                   Date / Time: 2/18/2025  3:06 PM    Patient Admission Status: Inpatient   Readmission Risk (Low < 19, Mod (19-27), High > 27): Readmission Risk Score: 6.2    Current PCP: Pat Ayoub MD  PCP verified by CM? Yes    Chart Reviewed: Yes      History Provided by: Patient  Patient Orientation: Alert and Oriented    Patient Cognition: Alert    Hospitalization in the last 30 days (Readmission):  No    If yes, Readmission Assessment in CM Navigator will be completed.    Advance Directives:      Code Status: Full Code   Patient's Primary Decision Maker is: Legal Next of Kin    Primary Decision Maker: DaianaIzzyJose - Child - [phone]    Discharge Planning:    Patient lives with: Children Type of Home: Other (Comment) (2nd floor condo)  Primary Care Giver: Self  Patient Support Systems include: Children   Current Financial resources: None  Current community resources: None  Current services prior to admission: Durable Medical Equipment            Current DME: Cane (Patient states that she doesnt use cane but owns it)            Type of Home Care services:  OT, PT    ADLS  Prior functional level: Independent in ADLs/IADLs  Current functional level: Independent in ADLs/IADLs    PT AM-PAC: 20 /24  OT AM-PAC: 19 /24    Family can provide assistance at DC: Yes  Would you like Case Management to discuss the discharge plan with any other family members/significant others, and if so, who? Yes (Bernardino Garcia)  Plans to Return to Present Housing: Unknown at present  Other Identified Issues/Barriers to

## 2025-02-21 ENCOUNTER — APPOINTMENT (OUTPATIENT)
Dept: GENERAL RADIOLOGY | Age: 75
DRG: 552 | End: 2025-02-21
Payer: MEDICARE

## 2025-02-21 VITALS
BODY MASS INDEX: 28.7 KG/M2 | WEIGHT: 152 LBS | DIASTOLIC BLOOD PRESSURE: 50 MMHG | OXYGEN SATURATION: 92 % | SYSTOLIC BLOOD PRESSURE: 108 MMHG | RESPIRATION RATE: 18 BRPM | HEART RATE: 74 BPM | TEMPERATURE: 98.1 F | HEIGHT: 61 IN

## 2025-02-21 LAB
FLUAV RNA RESP QL NAA+PROBE: NOT DETECTED
FLUBV RNA RESP QL NAA+PROBE: NOT DETECTED
REPORT: NORMAL
RESP PATH DNA+RNA PNL NPH NAA+NON-PROBE: NORMAL
SARS-COV-2 RNA RESP QL NAA+PROBE: NOT DETECTED

## 2025-02-21 PROCEDURE — 6370000000 HC RX 637 (ALT 250 FOR IP): Performed by: PHYSICIAN ASSISTANT

## 2025-02-21 PROCEDURE — 71045 X-RAY EXAM CHEST 1 VIEW: CPT

## 2025-02-21 PROCEDURE — 6370000000 HC RX 637 (ALT 250 FOR IP): Performed by: INTERNAL MEDICINE

## 2025-02-21 PROCEDURE — 0202U NFCT DS 22 TRGT SARS-COV-2: CPT

## 2025-02-21 PROCEDURE — 94680 O2 UPTK RST&XERS DIR SIMPLE: CPT

## 2025-02-21 PROCEDURE — 97116 GAIT TRAINING THERAPY: CPT

## 2025-02-21 PROCEDURE — 6370000000 HC RX 637 (ALT 250 FOR IP): Performed by: NURSE PRACTITIONER

## 2025-02-21 PROCEDURE — 87636 SARSCOV2 & INF A&B AMP PRB: CPT

## 2025-02-21 PROCEDURE — 6360000002 HC RX W HCPCS: Performed by: PHYSICIAN ASSISTANT

## 2025-02-21 PROCEDURE — 2500000003 HC RX 250 WO HCPCS: Performed by: PHYSICIAN ASSISTANT

## 2025-02-21 PROCEDURE — 94760 N-INVAS EAR/PLS OXIMETRY 1: CPT

## 2025-02-21 PROCEDURE — 97110 THERAPEUTIC EXERCISES: CPT

## 2025-02-21 RX ORDER — BUDESONIDE AND FORMOTEROL FUMARATE DIHYDRATE 160; 4.5 UG/1; UG/1
2 AEROSOL RESPIRATORY (INHALATION)
Qty: 10.2 G | Refills: 1 | Status: SHIPPED | OUTPATIENT
Start: 2025-02-21

## 2025-02-21 RX ORDER — PREDNISONE 20 MG/1
40 TABLET ORAL DAILY
Status: DISCONTINUED | OUTPATIENT
Start: 2025-02-21 | End: 2025-02-21 | Stop reason: HOSPADM

## 2025-02-21 RX ORDER — IPRATROPIUM BROMIDE AND ALBUTEROL SULFATE 2.5; .5 MG/3ML; MG/3ML
1 SOLUTION RESPIRATORY (INHALATION)
Status: DISCONTINUED | OUTPATIENT
Start: 2025-02-21 | End: 2025-02-21 | Stop reason: HOSPADM

## 2025-02-21 RX ORDER — METHOCARBAMOL 750 MG/1
750 TABLET, FILM COATED ORAL 3 TIMES DAILY PRN
Qty: 21 TABLET | Refills: 0 | Status: SHIPPED | OUTPATIENT
Start: 2025-02-21 | End: 2025-02-28

## 2025-02-21 RX ORDER — PREDNISONE 20 MG/1
TABLET ORAL
Qty: 11 TABLET | Refills: 0 | Status: SHIPPED | OUTPATIENT
Start: 2025-02-22 | End: 2025-02-21

## 2025-02-21 RX ORDER — BUDESONIDE AND FORMOTEROL FUMARATE DIHYDRATE 160; 4.5 UG/1; UG/1
2 AEROSOL RESPIRATORY (INHALATION)
Status: DISCONTINUED | OUTPATIENT
Start: 2025-02-21 | End: 2025-02-21 | Stop reason: HOSPADM

## 2025-02-21 RX ORDER — IPRATROPIUM BROMIDE AND ALBUTEROL SULFATE 2.5; .5 MG/3ML; MG/3ML
1 SOLUTION RESPIRATORY (INHALATION) EVERY 4 HOURS PRN
Status: DISCONTINUED | OUTPATIENT
Start: 2025-02-21 | End: 2025-02-21 | Stop reason: HOSPADM

## 2025-02-21 RX ORDER — IPRATROPIUM BROMIDE AND ALBUTEROL SULFATE 2.5; .5 MG/3ML; MG/3ML
1 SOLUTION RESPIRATORY (INHALATION)
Status: DISCONTINUED | OUTPATIENT
Start: 2025-02-21 | End: 2025-02-21

## 2025-02-21 RX ORDER — PREDNISONE 20 MG/1
40 TABLET ORAL DAILY
Qty: 10 TABLET | Refills: 0 | Status: SHIPPED | OUTPATIENT
Start: 2025-02-22 | End: 2025-02-27

## 2025-02-21 RX ADMIN — GUAIFENESIN AND DEXTROMETHORPHAN 5 ML: 100; 10 SYRUP ORAL at 09:40

## 2025-02-21 RX ADMIN — ENOXAPARIN SODIUM 40 MG: 100 INJECTION SUBCUTANEOUS at 09:32

## 2025-02-21 RX ADMIN — ACETAMINOPHEN 650 MG: 325 TABLET ORAL at 09:35

## 2025-02-21 RX ADMIN — ISOSORBIDE MONONITRATE 60 MG: 60 TABLET, EXTENDED RELEASE ORAL at 09:34

## 2025-02-21 RX ADMIN — LISINOPRIL 20 MG: 20 TABLET ORAL at 09:36

## 2025-02-21 RX ADMIN — SODIUM CHLORIDE, PRESERVATIVE FREE 10 ML: 5 INJECTION INTRAVENOUS at 09:46

## 2025-02-21 RX ADMIN — PREDNISONE 40 MG: 20 TABLET ORAL at 09:34

## 2025-02-21 RX ADMIN — RANOLAZINE 500 MG: 500 TABLET, EXTENDED RELEASE ORAL at 09:34

## 2025-02-21 RX ADMIN — ASPIRIN 81 MG: 81 TABLET, CHEWABLE ORAL at 09:35

## 2025-02-21 RX ADMIN — PANTOPRAZOLE SODIUM 40 MG: 40 TABLET, DELAYED RELEASE ORAL at 06:20

## 2025-02-21 RX ADMIN — ACETAMINOPHEN 650 MG: 325 TABLET ORAL at 16:23

## 2025-02-21 RX ADMIN — CARVEDILOL 25 MG: 25 TABLET, FILM COATED ORAL at 09:33

## 2025-02-21 NOTE — PLAN OF CARE
Problem: Safety - Adult  Goal: Free from fall injury  2/21/2025 1804 by Kash Waters RN  Outcome: Adequate for Discharge  2/21/2025 1407 by Kash Waters RN  Outcome: Progressing     Problem: Discharge Planning  Goal: Discharge to home or other facility with appropriate resources  2/21/2025 1804 by Kash Wtaers RN  Outcome: Adequate for Discharge  2/21/2025 1407 by Kash Waters RN  Outcome: Progressing     Problem: Pain  Goal: Verbalizes/displays adequate comfort level or baseline comfort level  2/21/2025 1804 by Kash Waters RN  Outcome: Adequate for Discharge  2/21/2025 1407 by Kash Waters RN  Outcome: Progressing     Problem: Respiratory - Adult  Goal: Achieves optimal ventilation and oxygenation  2/21/2025 1804 by Kash Waters RN  Outcome: Adequate for Discharge  2/21/2025 1407 by Kash Waters RN  Outcome: Progressing     Problem: Musculoskeletal - Adult  Goal: Return mobility to safest level of function  2/21/2025 1804 by Kash Waters RN  Outcome: Adequate for Discharge  2/21/2025 1407 by Kash Waters RN  Outcome: Progressing  Goal: Return ADL status to a safe level of function  2/21/2025 1804 by Kash Waters RN  Outcome: Adequate for Discharge  2/21/2025 1407 by Kash Waters RN  Outcome: Progressing

## 2025-02-21 NOTE — DISCHARGE INSTR - COC
Continuity of Care Form    Patient Name: Angela Ahmadi   :  1950  MRN:  [id number]    Admit date:  2025  Discharge date:  2025    Code Status Order: Full Code   Advance Directives:   Advance Care Flowsheet Documentation             Admitting Physician:  Bria Landis MD  PCP: Pat Ayoub MD    Discharging Nurse: AE  Discharging Hospital Unit/Room#: 3AN-3322/3322-01  Discharging Unit Phone Number: [phone]    Emergency Contact:   Extended Emergency Contact Information  Primary Emergency Contact: [place] [address] [facility]  Home Phone: [phone]  Mobile Phone: [phone]  Relation: Niece/Nephew  Secondary Emergency Contact: Jr. Ahmadi Cash  Mobile Phone: [phone]  Relation: Child  Preferred language: English   needed? No    Past Surgical History:  Past Surgical History:   Procedure Laterality Date    BLADDER SURGERY      sling    BLADDER SUSPENSION      COLONOSCOPY      COLONOSCOPY N/A 2020    COLONOSCOPY POLYPECTOMY SNARE/COLD BIOPSY performed by Orlando Ureña MD at [facility] ENDOSCOPY    COLONOSCOPY  2020    COLONOSCOPY WITH BIOPSY performed by Orlando Ureña MD at [facility] ENDOSCOPY    COLONOSCOPY N/A 10/17/2023    COLONOSCOPY POLYPECTOMY SNARE/COLD BIOPSY performed by Orlando Ureña MD at [facility] ENDOSCOPY    CORONARY ANGIOPLASTY WITH STENT PLACEMENT      2 heart stents    HEMORRHOID SURGERY N/A 2020    HEMORRHOIDECTOMY performed by Orlando Ureña MD at [facility] OR    HYSTERECTOMY (CERVIX STATUS UNKNOWN)      OTHER SURGICAL HISTORY      coronary stent to LAD    OTHER SURGICAL HISTORY      TVT, A/P REPAIR        Immunization History:   Immunization History   Administered Date(s) Administered    COVID-19, PFIZER PURPLE top, DILUTE for use, (age 12 y+), 30mcg/0.3mL 2021, 04/15/2021, 2022    COVID-19, PFIZER, (age 12y+), IM, 30mcg/0.3mL 09/10/2024       Active

## 2025-02-21 NOTE — CARE COORDINATION
Case Management -  Discharge Note      Patient Name: Angela Ahmadi                   YOB: 1950  Room: [address] [place] [address]            Readmission Risk (Low < 19, Mod (19-27), High > 27): Readmission Risk Score: 6.2    Current PCP: Pat Ayoub MD      (IMM) Important Message from Medicare:    Has pt received appropriate compliance notices before being discharged if required: yes  Compliance doc:  [] 2nd IMM; [] Code 44 [] [place]  Date Given: 2/20/25 Given By: GOLD    PT AM-PAC: 20 /24  OT AM-PAC: 19 /24    Patient/patient representative has been educated on the benefits of HHC as well as the possible risks of declining recommended services. Patient/patient representative has acknowledged the information provided and decided on the following discharge plan. Patient/ patient representative has been provided freedom of choice regarding service provider, supported by basic dialogue that supports the patient's individualized plan of care/goals.    [facility])  [address]   [facility] [address]  Phone: [phone]  Fax: [phone]     [facility] agency notified of discharge:  [x] Yes [] No  [] NA    Family notified of discharge:  [x] Yes  [] No  [] NA    Facility notified of discharge:  [] Yes  [] No  [x] NA    Pt is being discharged with Outpt IV Antibiotics  [] Yes [] No  [x] NA  If yes, make sure GENET is faxed to [facility] agency, and meds are called in to pharmacy by RN from GENET orders only.      Financial    Payor: [employer] MEDICARE / Plan: [employer] CHOICE-PPO MEDICARE / Product Type: *No Product type* /     Pharmacy:  Potential assistance Purchasing Medications: No  Meds-to-Beds request: Yes      [facility] - [facility] [address] [facility] - P [phone] - F [phone]  [address] [facility] [address] [facility] [address]  Phone: [phone] Fax: [phone]    [facility] - [facility] [address] - P [phone] - T

## 2025-02-21 NOTE — PLAN OF CARE
Problem: Safety - Adult  Goal: Free from fall injury  Outcome: Progressing     Problem: Discharge Planning  Goal: Discharge to home or other facility with appropriate resources  Outcome: Progressing     Problem: Pain  Goal: Verbalizes/displays adequate comfort level or baseline comfort level  Outcome: Progressing     Problem: Respiratory - Adult  Goal: Achieves optimal ventilation and oxygenation  Outcome: Progressing     Problem: Musculoskeletal - Adult  Goal: Return mobility to safest level of function  Outcome: Progressing  Goal: Return ADL status to a safe level of function  Outcome: Progressing

## 2025-02-21 NOTE — PROGRESS NOTES
HOSPITALISTS PROGRESS NOTE    2/19/2025 9:36 AM        Name: Angela Ahmadi .              Admitted: 2/18/2025  Primary Care Provider: Pat Ayoub MD (Tel: [phone])      Brief Course: This 74 y.o. female who presented to ED for evaluation of left leg pain     Interval history:   Pt seen and examined today   Overnight events noted and interval ancillary notes reviewed.  On 2 L NC satting around 96%; wean as started keep sats over 92%  Afebrile overnight, WBCs WNL.  Check CRP, ESR, uric acid  Serum sodium 133 this morning  Pt resting in bed; reported endorsed       but denied any fevers, chills, chest pain, palpitations, cough, SOB, dizziness, blurry vision, bowel or bladder dysfunction, any focal sensory or motor deficits.       Assessment & Plan:     Left leg pain; X-ray left knee and hip showed mild degenerative changes.  No acute fractures   CT lumbar spine showed pronounced degenerative changes at L4-S1. Venous Doppler LLE negative for DVT.   Orthopedic surgery consulted; started on Solu-Medrol.  Continue as needed pain meds and muscle relaxant       CAD s/p PCI; on aspirin, Plavix, beta-blocker, statin, Imdur and Ranexa     Hypertension; continue current regimen monitor BP closely     DVT PPX: levonox    Code:Full Code    Disposition: Once acute medical issues have resolved    Current Medications  methocarbamol (ROBAXIN) tablet 750 mg, TID PRN  amLODIPine (NORVASC) tablet 5 mg, Daily  aspirin chewable tablet 81 mg, Daily  carvedilol (COREG) tablet 25 mg, BID WC  clopidogrel (PLAVIX) tablet 75 mg, QPM  isosorbide mononitrate (IMDUR) extended release tablet 60 mg, BID  lisinopril (PRINIVIL;ZESTRIL) tablet 20 mg, Daily  pantoprazole (PROTONIX) tablet 40 mg, QAM AC  pravastatin (PRAVACHOL) tablet 80 mg, QPM  ranolazine (RANEXA) extended release tablet 500 mg, BID  sodium chloride flush 0.9 % injection 5-40 mL, 2 times per day  sodium 
                                                                      HOSPITALISTS PROGRESS NOTE    2/20/2025 7:51 AM        Name: Angela Ahmadi .              Admitted: 2/18/2025  Primary Care Provider: Pat Ayoub MD (Tel: [phone])      Brief Course: This 74 y.o. female who presented to ED for evaluation of left leg pain.  Admitted for lumbar radiculopathy.  Orthopedic surgery input appreciated.  Transitioned to p.o. steroids.  PT/OT recommended SNF.  Stable for discharge; awaiting pre-CERT            Interval history:   Pt seen and examined today.  Overnight events noted, interval ancillary notes and labs reviewed.   On 2 L NC satting around 95%; wean as tolerated sat were 92%  Hemodynamically stable.  Reported that her leg pain has improved.  Denied any fever, chills, numbness, tingling bowel or bladder dysfunction        Assessment & Plan:     Lumbar radiculopathy; presenting with left leg pain  X-ray left knee and hip showed mild degenerative changes.  No acute fractures   CT lumbar spine showed pronounced degenerative changes at L4-S1. Venous Doppler LLE negative for DVT.   Orthopedic surgery input appreciated; transition to p.o. steroid.    Continue as needed pain meds and muscle relaxant.  PT/OT recommended SNF     CAD s/p PCI; on aspirin, Plavix, beta-blocker, statin, Imdur and Ranexa     Hypertension; continue current regimen monitor BP closely    Hyperlipidemia; continue statins     DVT PPX: levonox  Code:Full Code    Disposition: SNF; and awaiting pre-CERT    Current Medications  methocarbamol (ROBAXIN) tablet 750 mg, TID PRN  methylPREDNISolone sodium succ (SOLU-MEDROL) 40 mg in sterile water 1 mL injection, Daily  acetaminophen (TYLENOL) tablet 650 mg, TID  melatonin tablet 3 mg, Nightly PRN  amLODIPine (NORVASC) tablet 5 mg, Daily  aspirin chewable tablet 81 mg, Daily  carvedilol (COREG) tablet 25 mg, BID WC  clopidogrel (PLAVIX) tablet 75 mg, QPM  isosorbide mononitrate (IMDUR) extended 
   02/21/25 0920   Resting (Room Air)   SpO2 90   HR 84   During Walk (Room Air)   SpO2 88   HR 95   Walk/Assistance Device Walker   Rate of Dyspnea 1   During Walk (On O2)   Need Additional O2 Flow Rate Rows No   After Walk   SpO2 90   HR 84   Rate of Dyspnea 0   Does the Patient Qualify for Home O2 No   Does the Patient Need Portable Oxygen Tanks No       
  [facility] - Inpatient Rehabilitation Department   Phone: [phone]    Physical Therapy    [] Initial Evaluation            [x] Daily Treatment Note         [] Discharge Summary      Patient: Angela Ahmadi   : 1950   MRN: [id number]   Date of Service:  2025  Admitting Diagnosis: Left leg pain  Current Admission Summary: Angela Ahmadi is a 74 y.o. female who presented to ED for evaluation of left leg pain that began yesterday.  Patient denies any injury or trauma.  She reports pain is located to her left calf and radiates up her posterior leg to her hip.  She denies any back pain.  She denies any history of back problems in the past.  She reports pain is worse when she ambulates.  She reports decreased range of motion and strength due to pain.  She denies any leg swelling, rashes, numbness or tingling, warmth.  She denies fever, chest pain, shortness of breath, GI symptoms, urinary symptoms.  She denies any other complaints or concerns at this time.   Workup initiated in ED.  CT lumbar spine negative for acute process.  Doppler LLE negative for DVT.  X-ray left hip and knee negative for acute process.  Patient was given Norco in ED with improvement in symptoms.  However she still had significant pain when attempting to ambulate.   CT of lumbar spine -   Past Medical History:  has a past medical history of Arthritis, Asthma, CAD (coronary artery disease), Cardiomyopathy ([facility]), Congenital heart disease, GERD (gastroesophageal reflux disease), Hyperlipidemia, Hypertension, IBS (irritable bowel syndrome), Incontinence of urine, and MI (myocardial infarction) ([facility]).  Past Surgical History:  has a past surgical history that includes Hysterectomy; Bladder surgery (); Colonoscopy; other surgical history (); Coronary angioplasty with stent; bladder suspension; other surgical history; Colonoscopy (N/A, 2020); Colonoscopy (2020); Hemorrhoid surgery (N/A, 2020); and 
  [facility] - Inpatient Rehabilitation Department   Phone: [phone]    Physical Therapy    [] Initial Evaluation            [x] Daily Treatment Note         [] Discharge Summary      Patient: Angela Ahmadi   : 1950   MRN: [id number]   Date of Service:  2025  Admitting Diagnosis: Left leg pain  Current Admission Summary: Angela Ahmadi is a 74 y.o. female who presented to ED for evaluation of left leg pain that began yesterday.  Patient denies any injury or trauma.  She reports pain is located to her left calf and radiates up her posterior leg to her hip.  She denies any back pain.  She denies any history of back problems in the past.  She reports pain is worse when she ambulates.  She reports decreased range of motion and strength due to pain.  She denies any leg swelling, rashes, numbness or tingling, warmth.  She denies fever, chest pain, shortness of breath, GI symptoms, urinary symptoms.  She denies any other complaints or concerns at this time.   Workup initiated in ED.  CT lumbar spine negative for acute process.  Doppler LLE negative for DVT.  X-ray left hip and knee negative for acute process.  Patient was given Norco in ED with improvement in symptoms.  However she still had significant pain when attempting to ambulate.   CT of lumbar spine -   Past Medical History:  has a past medical history of Arthritis, Asthma, CAD (coronary artery disease), Cardiomyopathy ([facility]), Congenital heart disease, GERD (gastroesophageal reflux disease), Hyperlipidemia, Hypertension, IBS (irritable bowel syndrome), Incontinence of urine, and MI (myocardial infarction) ([facility]).  Past Surgical History:  has a past surgical history that includes Hysterectomy; Bladder surgery (); Colonoscopy; other surgical history (); Coronary angioplasty with stent; bladder suspension; other surgical history; Colonoscopy (N/A, 2020); Colonoscopy (2020); Hemorrhoid surgery (N/A, 2020); and 
  Physician Progress Note      PATIENT:               TRICIA LO  [facility] #:                  [id number]  :                       1950  ADMIT DATE:       2025 3:06 PM  DISCH DATE:  RESPONDING  PROVIDER #:        Bria Landis MD          QUERY TEXT:    Pt admitted with left leg pain. Pt noted by ortho to have lumbar radiculitis.   If possible, please document in progress notes and discharge summary the   relationship, if any, between left leg pain and lumbar radiculitis.    The medical record reflects the following:  Risk Factors: 74 year old female w/ left leg pain and L4-S1 w/ degenerative   changes  Clinical Indicators:  Ortho consult- \"Started  and she felt she was   unable to walk dur to pain yesterday.  Describes a pins and needles sensation   at the base of her foot extending up her leg.  She also has posterior leg   pain involving nearly the entire leg when standing or walking or getting out   of bed... Lumbar CT shows pronounced degenerative changes L4-S1... Lumbar   radiculitis\".  Treatment: Imaging, Ortho consult, Solumedrol, Tylenol, Robaxin  Options provided:  -- Left leg pain due to lumbar radiculitis  -- Left leg pain unrelated to lumbar radiculitis  -- Other - I will add my own diagnosis  -- Disagree - Not applicable / Not valid  -- Disagree - Clinically unable to determine / Unknown  -- Refer to Clinical Documentation Reviewer    PROVIDER RESPONSE TEXT:    This patient has left leg pain due to lumbar radiculitis.    Query created by: Maia Pham on 2025 11:29 AM      Electronically signed by:  Bria Landis MD 2025 12:39 PM          
CLINICAL PHARMACY NOTE: MEDS TO BEDS    Total # of Prescriptions Filled: 1   The following medications were delivered to the patient:  PREDNISONE 20MG TABS    Additional Documentation: Kash SIMS approved to deliver medication to patient room=signed  [facility] Tech  
CLINICAL PHARMACY NOTE: MEDS TO BEDS    Total # of Prescriptions Filled: 2   The following medications were delivered to the patient:  Symbicort 160/4.5mcg inhaler  Methocarbamol 750mg    Additional Documentation:     LEVI eHwitt approved medication delivery    Medications were delivered to patient's room and patient signed for    Reina Son CPhT   
PM assessment complete and documented. Meds given per MAR. Pt is resting comfortably in bed. Denies pain at present. Call light in reach. No other needs or concerns expressed.  
Patient seen in ED, room 30.  Admission completed with the following exceptions:  4 Eyes Assessment, Immunizations, Vaccines, Rights and Responsibilities, Orientation to room, Plan of Care, Education/Learning Assessment and Education Plan, white board, height and weight, pain assessment and head to toe assessment.  Patient is alert and oriented X 4.  Patient lives in a house two story with her son and is being admitted for Left leg pain.  Home Medication reconciliation will be/has been completed by Pharmacy Staff.  All patient's and/or family's questions answered.     Patient reports her  left leg has been hurting since this past weekend and it is difficult to ambulate with the pain  
Patient seen this am. Feeling much better, denies leg pain or back pain. Has some cough. No new SOB.     Wheezing on exam.     Check cxr, rapid flu,covid, molecular panel.   Home o2 eval.     Continue steroids, start dulera for copd with exacerbation. Home later today with home care      Xuan Torre PA-C  
Report called to Leoncio on 3A. All questions answered.   
Report received from LEVI Read. Patient in room resting comfortably with eyes closed at this time. Patient states no needs at this time. All personal belongings and call light within reach.  
Shift assessment completed. Routine vitals stable. Scheduled medications given. Patient is awake, alert and oriented x4. Respirations are easy and unlabored. Patient does not appear to be in distress, resting comfortably at this time. Call light within reach. Denies needs at this time. Pain 0/10 per patient, pain only with ambulation but improved today. Requiring 2L O2, desats with ambulation. New onset cough, pt requesting cough syrup. MD notified via perfect serve.    Kash Waters RN, BSN     
Shift assessment completed. Routine vitals stable. Scheduled medications given. Patient is awake, alert and oriented x4. Respirations are easy and unlabored. Patient does not appear to be in distress, resting comfortably at this time. Call light within reach. Denies needs at this time. Pain 0/10 per patient. PRN cough medication administered per patient request, see MAR.    Kash Waters RN, BSN     
This patient has had a discharge order placed. They are returning home and being picked up in the lobby. Discharge paperwork has been printed, highlighted, and gone over with the patient by this RN. Patient understands teaching and has no further questions at this time. IV has been removed with no complications. Telemetry has been removed. Pt has all belongings present.    Kash Waters RN, BSN    
Transferred into ED room 30. Alert and oriented times 4. Denies pain. 02 @ 2L via NC.   
[facility] Orthopedic Surgery  Progress Note      Subjective: The patient is seen sitting up in the chair.  She reports notable improvement in her symptoms today.  Denies new issues.     Independent imaging review of the left knee and hip via plain films demonstrated: mild degenerative changes without fracture.  Lumbar CT shows pronounced degenerative changes L4-S1    Patient uses no assistive devices to ambulate.      Review of Systems:  Constitutional: Negative for fever, chills, fatigue.   Skin:  Negative for pruritis, rash  Eyes: Negative for photophobia and visual disturbance.   ENT:  Negative for rhinorrhea, epistaxis, sore throat  Respiratory:  Negative for cough and shortness of breath.   Cardiovascular: Negative for chest pain.   Gastrointestinal: Negative for nausea, vomiting, diarrhea.  Genitourinary: Negative for dysuria and difficulty urinating.   Neurological: Negative for confusion, dysarthria, tremors, seizures.   Psychiatric:  Negative for depression or anxiety  Musculoskeletal:  Positive for left leg pain    Objective:  Vitals:    02/20/25 0903   BP: 123/75   Pulse: 78   Resp: 18   Temp: 98.2 °F (36.8 °C)   SpO2: 96%      Physical Examination:  GENERAL: No apparent distress, well-nourished  SKIN:  Warm and dry  EYES: Nonicteric.   ENT: Mucous membranes moist  HEAD: Normocephalic, atraumatic  RESPIRATORY: Resp easy and unlabored  CARDIOVASCULAR: Regular rate and rhythm  GI: Abdomen soft, nontender  NEURO: Awake and alert.  No speech defect  PSYCHIATRIC: Appropriate affect; not agitated  MUSCULOSKELETAL:  LEFT leg  Inspection: On exam there are no ulcerations, rashes or lesions about the left leg.   There is no pain to palpation of the left leg anywhere.  No swelling or erythema.  She has limited hip flexion due to pain.  No pain with hip IR/ER.  No pain with passive knee ROM.  Equivocal SLR.  She has intact extensor mechanism and can do SLR actively.  Motor: Intact DF/PF.    Sensation: Grossly intact to 
Colonoscopy (N/A, 10/17/2023).  Discharge Recommendations: Angela Ahmadi scored a 15/24 on the AM-PAC short mobility form. Current research shows that an AM-PAC score of 17 or less is typically not associated with a discharge to the patient's home setting. Based on the patient's AM-PAC score and their current functional mobility deficits, it is recommended that the patient have 3-5 sessions per week of Physical Therapy at d/c to increase the patient's independence.  Please see assessment section for further patient specific details.  - At this time unsafe to return home.  Hopeful that initiation of Steroid from Ortho will improve symptoms and allow Pt. To improve mobility and return home.  If patient discharges prior to next session this note will serve as a discharge summary.  Please see below for the latest assessment towards goals.    DME Required For Discharge: rolling walker would need RW for safe ambulation  Precautions/Restrictions: high fall risk  Weight Bearing Restrictions: weight bearing as tolerated and no restrictions  [] Right Upper Extremity  [] Left Upper Extremity [] Right Lower Extremity  [x] Left Lower Extremity     Required Braces/Orthotics: no braces required   [] Right  [] Left  Positional Restrictions:no positional restrictions    Pre-Admission Information   Lives With: son    Type of Home: condo  Home Layout: one level [facility]  Home Access:  7 step to enter with handrail.  Handrails are located on R side. (Not exactly sure about rails)  Bathroom Layout: walk in shower  Bathroom Equipment:  none  Toilet Height: standard height  Home Equipment:  none  Transfer Assistance: Independent without use of device  Ambulation Assistance:Independent without use of device  ADL Assistance: independent with all ADL's  IADL Assistance: independent with homemaking tasks  Active :        [x] Yes  [] No  Hand Dominance: [] Left  [x] Right  Current Employment: retired.  Occupation: 
Additional Notes: Patient consent was obtained to proceed with the visit and recommended plan of care after discussion of all risks and benefits, including the risks of COVID-19 exposure.
O2, 95%, 92% on RA  Sensation:   denies numbness and tingling  ROM:   (B) UE AROM WFL  Strength:   (B) UE strength grossly WFL    Therapist Clinical Decision Making (Complexity): low complexity  Clinical Presentation: stable      Subjective  General: Patient lying upright in bed upon arrival, agreeable to evaluation. Reports she leans to the left when sitting on her recliner at home  Pain: 0/10 - at rest  Pain Interventions: pain medication in place prior to arrival        Activities of Daily Living  Basic Activities of Daily Living  General Comments: declines  Instrumental Activities of Daily Living  No IADL completed on this date.    Functional Mobility  Bed Mobility:  Supine to Sit: supervision  Scooting: supervision  Comments:  Transfers:  Sit to stand transfer:contact guard assistance  Stand to sit transfer: contact guard assistance  Comments:  Functional Mobility  Functional Mobility Activity: short distance in hallway, room  Device Use: rolling walker  Required Assistance: contact guard assistance  Balance:  Static Sitting Balance: fair (+): maintains balance at SBA/supervision without use of UE support  Dynamic Sitting Balance: fair (+): maintains balance at SBA/supervision without use of UE support  Static Standing Balance: fair (-): maintains balance at CGA with use of UE support  Comments:    Other Therapeutic Interventions    Educated on lumbar region/sciatic nerve flossing, completes x5 with LLE, able to demonstrate with supervision additional x5 with LLE. Reports temporary resolution of symptoms after completion of nerve flossing with additional trial of ambulation    Functional Outcomes  AM-PAC Inpatient Daily Activity Raw Score: 19                                    Cognition  WFL  Comments: Some increased time required for recall  Orientation:    alert and oriented x 4  Command Following:   WFL     Education  Barriers To Learning: none  Patient Education: patient educated on goals, OT role and 
Render Risk Assessment In Note?: yes
Detail Level: Simple

## 2025-02-21 NOTE — DISCHARGE SUMMARY
(Oral)   Resp 18   Ht 1.549 m (5' 1\")   Wt 68.9 kg (152 lb)   SpO2 92%   BMI 28.72 kg/m²   General appearance:  Appears comfortable. AAOx3  HEENT: atraumatic, Pupils equal, muscous membranes moist, no masses appreciated  Cardiovascular: Regular rate and rhythm no murmurs appreciated  Respiratory: Mild diffuse wheezing bilaterally  Gastrointestinal: Abdomen soft, non-tender, BS+  EXT: no edema  Neurology: no gross focal deficts  Psychiatry: Appropriate affect. Not agitated  Skin: Warm, dry, no rashes appreciated    Discharge Medications:   Current Discharge Medication List        START taking these medications    Details   budesonide-formoterol (SYMBICORT) 160-4.5 MCG/ACT AERO Inhale 2 puffs into the lungs in the morning and 2 puffs in the evening.  Qty: 10.2 g, Refills: 1      methocarbamol (ROBAXIN) 750 MG tablet Take 1 tablet by mouth 3 times daily as needed (muscle spasms)  Qty: 21 tablet, Refills: 0      predniSONE (DELTASONE) 20 MG tablet Take 2 tablets by mouth daily for 5 days  Qty: 10 tablet, Refills: 0           Current Discharge Medication List        Current Discharge Medication List        CONTINUE these medications which have NOT CHANGED    Details   lisinopril (PRINIVIL;ZESTRIL) 40 MG tablet Take 0.5 tablets by mouth daily  Qty: 30 tablet, Refills: 5      clopidogrel (PLAVIX) 75 MG tablet Take 1 tablet by mouth every evening      pravastatin (PRAVACHOL) 80 MG tablet Take 1 tablet by mouth every evening      ranolazine (RANEXA) 500 MG extended release tablet Take 1 tablet by mouth 2 times daily  Qty: 60 tablet, Refills: 3      carvedilol (COREG) 25 MG tablet TAKE 1 TABLET BY MOUTH TWICE DAILY WITH MEALS  Qty: 180 tablet, Refills: 3      isosorbide mononitrate (IMDUR) 60 MG extended release tablet Take 1 tablet by mouth in the morning and at bedtime  Qty: 180 tablet, Refills: 3      pantoprazole (PROTONIX) 40 MG tablet Take 1 tablet by mouth every morning (before breakfast)  Qty: 30 tablet, Refills:

## (undated) DEVICE — GLOVE SURG SZ 6.5 L11.2IN FNGR THK9.8MIL STRW LTX POLYMER

## (undated) DEVICE — HYPODERMIC SAFETY NEEDLE: Brand: MAGELLAN

## (undated) DEVICE — CLIP INT L235CM WRK CHN DIA2.8MM OPN 11MM BRAID CATH ROT BX/10

## (undated) DEVICE — Device: Brand: DISPOSABLE ELECTROSURGICAL SNARE

## (undated) DEVICE — PACK PROCEDURE SURG EXTREMITY MFFOP CUST

## (undated) DEVICE — VALVE SUCTION AIR H2O SET ORCA POD + DISP

## (undated) DEVICE — TOWEL,OR,DSP,ST,BLUE,STD,4/PK,20PK/CS: Brand: MEDLINE

## (undated) DEVICE — SOLUTION IV IRRIG WATER 500ML POUR BRL ST 2F7113

## (undated) DEVICE — ELECTRODE PT RET AD L9FT HI MOIST COND ADH HYDRGEL CORDED

## (undated) DEVICE — SUTURE VCRL SZ 3-0 L27IN ABSRB UD L26MM SH 1/2 CIR J416H

## (undated) DEVICE — PROCEDURE KIT ENDOSCP CUST

## (undated) DEVICE — SHEET, T, LAPAROTOMY, STERILE: Brand: MEDLINE

## (undated) DEVICE — FORCEPS BX L240CM WRK CHN 2.8MM STD CAP W/ NDL MIC MESH

## (undated) DEVICE — BASIC SINGLE BASIN 1-LF: Brand: MEDLINE INDUSTRIES, INC.

## (undated) DEVICE — SYRINGE, LUER LOCK, 10ML: Brand: MEDLINE

## (undated) DEVICE — ENDOSCOPIC KIT 6X3/16 FT COLON W/ 1.1 OZ 2 GWN W/O BRSH

## (undated) DEVICE — TRAP SPEC RETRV CLR PLAS POLYP IN LN SUCT QUIK CTCH

## (undated) DEVICE — GAUZE,SPONGE,4"X4",8PLY,STRL,LF,10/TRAY: Brand: MEDLINE

## (undated) DEVICE — BW-412T DISP COMBO CLEANING BRUSH: Brand: SINGLE USE COMBINATION CLEANING BRUSH

## (undated) DEVICE — YANKAUER SUCTION INSTRUMENT NO CONTROL VENT, BULB TIP, CLEAR: Brand: YANKAUER

## (undated) DEVICE — SOLUTION IV IRRIG 500ML 0.9% SODIUM CHL 2F7123

## (undated) DEVICE — SYRINGE MED 30ML STD CLR PLAS LUERLOCK TIP N CTRL DISP

## (undated) DEVICE — AIR/WATER CLEANING ADAPTER FOR OLYMPUS® GI ENDOSCOPE: Brand: BULLDOG®

## (undated) DEVICE — SET VLV 3 PC AWS DISPOSABLE GRDIAN SCOPEVALET

## (undated) DEVICE — GAUZE,SPONGE,4"X4",16PLY,XRAY,STRL,LF: Brand: MEDLINE